# Patient Record
Sex: FEMALE | Race: WHITE | NOT HISPANIC OR LATINO | Employment: FULL TIME | URBAN - METROPOLITAN AREA
[De-identification: names, ages, dates, MRNs, and addresses within clinical notes are randomized per-mention and may not be internally consistent; named-entity substitution may affect disease eponyms.]

---

## 2024-03-19 ENCOUNTER — OFFICE VISIT (OUTPATIENT)
Dept: FAMILY MEDICINE | Facility: CLINIC | Age: 29
End: 2024-03-19
Payer: COMMERCIAL

## 2024-03-19 ENCOUNTER — TELEPHONE (OUTPATIENT)
Dept: SLEEP MEDICINE | Facility: CLINIC | Age: 29
End: 2024-03-19
Payer: COMMERCIAL

## 2024-03-19 VITALS
WEIGHT: 290 LBS | TEMPERATURE: 97 F | SYSTOLIC BLOOD PRESSURE: 164 MMHG | HEIGHT: 65 IN | DIASTOLIC BLOOD PRESSURE: 93 MMHG | RESPIRATION RATE: 18 BRPM | OXYGEN SATURATION: 97 % | BODY MASS INDEX: 48.32 KG/M2 | HEART RATE: 92 BPM

## 2024-03-19 DIAGNOSIS — F51.01 PRIMARY INSOMNIA: ICD-10-CM

## 2024-03-19 DIAGNOSIS — F41.9 ANXIETY: ICD-10-CM

## 2024-03-19 DIAGNOSIS — R29.818 SUSPECTED SLEEP APNEA: Primary | ICD-10-CM

## 2024-03-19 DIAGNOSIS — E66.01 MORBID OBESITY: ICD-10-CM

## 2024-03-19 DIAGNOSIS — E78.2 MIXED HYPERLIPIDEMIA: ICD-10-CM

## 2024-03-19 DIAGNOSIS — Z79.4 TYPE 2 DIABETES MELLITUS WITH HYPERGLYCEMIA, WITH LONG-TERM CURRENT USE OF INSULIN: ICD-10-CM

## 2024-03-19 DIAGNOSIS — Z11.4 SCREENING FOR HIV (HUMAN IMMUNODEFICIENCY VIRUS): ICD-10-CM

## 2024-03-19 DIAGNOSIS — L40.9 PSORIASIS: ICD-10-CM

## 2024-03-19 DIAGNOSIS — I10 ESSENTIAL HYPERTENSION: ICD-10-CM

## 2024-03-19 DIAGNOSIS — Z12.4 PAP SMEAR FOR CERVICAL CANCER SCREENING: ICD-10-CM

## 2024-03-19 DIAGNOSIS — E89.0 POSTABLATIVE HYPOTHYROIDISM: ICD-10-CM

## 2024-03-19 DIAGNOSIS — E11.65 TYPE 2 DIABETES MELLITUS WITH HYPERGLYCEMIA, WITH LONG-TERM CURRENT USE OF INSULIN: ICD-10-CM

## 2024-03-19 PROBLEM — E78.5 HYPERLIPIDEMIA: Status: ACTIVE | Noted: 2017-04-21

## 2024-03-19 PROCEDURE — 1159F MED LIST DOCD IN RCRD: CPT | Mod: CPTII,S$GLB,, | Performed by: STUDENT IN AN ORGANIZED HEALTH CARE EDUCATION/TRAINING PROGRAM

## 2024-03-19 PROCEDURE — 3080F DIAST BP >= 90 MM HG: CPT | Mod: CPTII,S$GLB,, | Performed by: STUDENT IN AN ORGANIZED HEALTH CARE EDUCATION/TRAINING PROGRAM

## 2024-03-19 PROCEDURE — 99385 PREV VISIT NEW AGE 18-39: CPT | Mod: S$GLB,,, | Performed by: STUDENT IN AN ORGANIZED HEALTH CARE EDUCATION/TRAINING PROGRAM

## 2024-03-19 PROCEDURE — 3008F BODY MASS INDEX DOCD: CPT | Mod: CPTII,S$GLB,, | Performed by: STUDENT IN AN ORGANIZED HEALTH CARE EDUCATION/TRAINING PROGRAM

## 2024-03-19 PROCEDURE — 3066F NEPHROPATHY DOC TX: CPT | Mod: CPTII,S$GLB,, | Performed by: STUDENT IN AN ORGANIZED HEALTH CARE EDUCATION/TRAINING PROGRAM

## 2024-03-19 PROCEDURE — 3061F NEG MICROALBUMINURIA REV: CPT | Mod: CPTII,S$GLB,, | Performed by: STUDENT IN AN ORGANIZED HEALTH CARE EDUCATION/TRAINING PROGRAM

## 2024-03-19 PROCEDURE — 1160F RVW MEDS BY RX/DR IN RCRD: CPT | Mod: CPTII,S$GLB,, | Performed by: STUDENT IN AN ORGANIZED HEALTH CARE EDUCATION/TRAINING PROGRAM

## 2024-03-19 PROCEDURE — 4010F ACE/ARB THERAPY RXD/TAKEN: CPT | Mod: CPTII,S$GLB,, | Performed by: STUDENT IN AN ORGANIZED HEALTH CARE EDUCATION/TRAINING PROGRAM

## 2024-03-19 PROCEDURE — 3077F SYST BP >= 140 MM HG: CPT | Mod: CPTII,S$GLB,, | Performed by: STUDENT IN AN ORGANIZED HEALTH CARE EDUCATION/TRAINING PROGRAM

## 2024-03-19 PROCEDURE — 3046F HEMOGLOBIN A1C LEVEL >9.0%: CPT | Mod: CPTII,S$GLB,, | Performed by: STUDENT IN AN ORGANIZED HEALTH CARE EDUCATION/TRAINING PROGRAM

## 2024-03-19 PROCEDURE — 99999 PR PBB SHADOW E&M-NEW PATIENT-LVL V: CPT | Mod: PBBFAC,,, | Performed by: STUDENT IN AN ORGANIZED HEALTH CARE EDUCATION/TRAINING PROGRAM

## 2024-03-19 RX ORDER — ASPIRIN 81 MG/1
TABLET ORAL
COMMUNITY

## 2024-03-19 RX ORDER — LEVOTHYROXINE SODIUM 200 UG/1
200 TABLET ORAL
COMMUNITY
Start: 2024-01-24 | End: 2024-03-19 | Stop reason: SDUPTHER

## 2024-03-19 RX ORDER — METFORMIN HYDROCHLORIDE 500 MG/1
1000 TABLET, EXTENDED RELEASE ORAL 2 TIMES DAILY WITH MEALS
COMMUNITY
Start: 2023-07-05

## 2024-03-19 RX ORDER — TIRZEPATIDE 7.5 MG/.5ML
7.5 INJECTION, SOLUTION SUBCUTANEOUS
Qty: 4 PEN | Refills: 4 | Status: SHIPPED | OUTPATIENT
Start: 2024-03-19 | End: 2024-04-01 | Stop reason: SDUPTHER

## 2024-03-19 RX ORDER — LEVOTHYROXINE SODIUM 50 UG/1
50 TABLET ORAL
COMMUNITY
Start: 2024-01-24 | End: 2024-03-19 | Stop reason: SDUPTHER

## 2024-03-19 RX ORDER — FLUOXETINE HYDROCHLORIDE 40 MG/1
40 CAPSULE ORAL DAILY
Qty: 90 CAPSULE | Refills: 3 | Status: SHIPPED | OUTPATIENT
Start: 2024-03-19

## 2024-03-19 RX ORDER — CARVEDILOL 6.25 MG/1
6.25 TABLET ORAL 2 TIMES DAILY
COMMUNITY
Start: 2024-01-09 | End: 2024-03-19 | Stop reason: SDUPTHER

## 2024-03-19 RX ORDER — INSULIN LISPRO 100 [IU]/ML
50 INJECTION, SOLUTION INTRAVENOUS; SUBCUTANEOUS 3 TIMES DAILY
COMMUNITY
Start: 2024-01-24 | End: 2024-05-31 | Stop reason: DRUGHIGH

## 2024-03-19 RX ORDER — PEN NEEDLE, DIABETIC 30 GX3/16"
NEEDLE, DISPOSABLE MISCELLANEOUS
COMMUNITY
Start: 2024-01-24

## 2024-03-19 RX ORDER — LEVOTHYROXINE SODIUM 200 UG/1
200 TABLET ORAL
Qty: 90 TABLET | Refills: 3 | Status: SHIPPED | OUTPATIENT
Start: 2024-03-19

## 2024-03-19 RX ORDER — HYDROXYZINE PAMOATE 25 MG/1
25-50 CAPSULE ORAL DAILY PRN
Qty: 90 CAPSULE | Refills: 1 | Status: SHIPPED | OUTPATIENT
Start: 2024-03-19

## 2024-03-19 RX ORDER — CARVEDILOL 12.5 MG/1
12.5 TABLET ORAL 2 TIMES DAILY
Qty: 180 TABLET | Refills: 3 | Status: SHIPPED | OUTPATIENT
Start: 2024-03-19 | End: 2024-09-18

## 2024-03-19 RX ORDER — BLOOD-GLUCOSE SENSOR
1 EACH MISCELLANEOUS
COMMUNITY
Start: 2024-01-24 | End: 2024-04-01 | Stop reason: SDUPTHER

## 2024-03-19 RX ORDER — ALPRAZOLAM 0.5 MG/1
0.5 TABLET ORAL NIGHTLY PRN
Qty: 30 TABLET | Refills: 1 | Status: SHIPPED | OUTPATIENT
Start: 2024-03-19

## 2024-03-19 RX ORDER — LEFLUNOMIDE 20 MG/1
20 TABLET ORAL DAILY
COMMUNITY
Start: 2024-01-23

## 2024-03-19 RX ORDER — HYDROCHLOROTHIAZIDE 12.5 MG/1
12.5 TABLET ORAL EVERY MORNING
COMMUNITY
Start: 2024-01-04 | End: 2024-03-19 | Stop reason: SDUPTHER

## 2024-03-19 RX ORDER — LEVOCETIRIZINE DIHYDROCHLORIDE 5 MG/1
5 TABLET, FILM COATED ORAL NIGHTLY
COMMUNITY

## 2024-03-19 RX ORDER — ENALAPRIL MALEATE 20 MG/1
20 TABLET ORAL 2 TIMES DAILY
COMMUNITY
Start: 2024-01-04 | End: 2024-04-01 | Stop reason: SDUPTHER

## 2024-03-19 RX ORDER — ALPRAZOLAM 0.5 MG/1
0.5 TABLET ORAL NIGHTLY PRN
COMMUNITY
Start: 2023-12-08 | End: 2024-03-19 | Stop reason: SDUPTHER

## 2024-03-19 RX ORDER — INSULIN GLARGINE 300 U/ML
50 INJECTION, SOLUTION SUBCUTANEOUS DAILY
COMMUNITY
Start: 2024-01-24

## 2024-03-19 RX ORDER — TIRZEPATIDE 7.5 MG/.5ML
7.5 INJECTION, SOLUTION SUBCUTANEOUS
COMMUNITY
End: 2024-03-19 | Stop reason: SDUPTHER

## 2024-03-19 RX ORDER — LABETALOL 100 MG/1
200 TABLET, FILM COATED ORAL 2 TIMES DAILY
COMMUNITY
Start: 2023-11-27 | End: 2024-03-19

## 2024-03-19 RX ORDER — FLUOXETINE HYDROCHLORIDE 20 MG/1
20 CAPSULE ORAL DAILY
COMMUNITY
Start: 2023-06-15 | End: 2024-03-19 | Stop reason: SDUPTHER

## 2024-03-19 RX ORDER — LEVOTHYROXINE SODIUM 50 UG/1
50 TABLET ORAL
Qty: 90 TABLET | Refills: 3 | Status: SHIPPED | OUTPATIENT
Start: 2024-03-19

## 2024-03-19 RX ORDER — BLOOD-GLUCOSE SENSOR
EACH MISCELLANEOUS
COMMUNITY
Start: 2023-08-16 | End: 2024-04-04

## 2024-03-19 RX ORDER — HYDROCHLOROTHIAZIDE 12.5 MG/1
12.5 TABLET ORAL EVERY MORNING
Qty: 90 TABLET | Refills: 3 | Status: SHIPPED | OUTPATIENT
Start: 2024-03-19

## 2024-03-19 RX ORDER — HYDROXYZINE PAMOATE 25 MG/1
25-50 CAPSULE ORAL DAILY PRN
COMMUNITY
Start: 2024-01-24 | End: 2024-03-19 | Stop reason: SDUPTHER

## 2024-03-19 NOTE — PROGRESS NOTES
Problem List Items Addressed This Visit          Cardiac/Vascular    Essential hypertension    Overview     -at goal today  - Current Hypertension Medications:   Hypertension Medications               carvediloL (COREG) 12.5 MG tablet Take 1 tablet (12.5 mg total) by mouth 2 (two) times daily.    enalapril (VASOTEC) 20 MG tablet Take 20 mg by mouth 2 (two) times daily.    hydroCHLOROthiazide (HYDRODIURIL) 12.5 MG Tab Take 1 tablet (12.5 mg total) by mouth every morning.        -continue lifestyle modification with low sodium diet and exercise   -discussed hypertension disease course and importance of treating high blood pressure  -patient understood and advised of risk of untreated blood pressure.  ER precautions were given   for symptoms of hypertensive urgency and emergency.           Relevant Medications    carvediloL (COREG) 12.5 MG tablet    hydroCHLOROthiazide (HYDRODIURIL) 12.5 MG Tab    Other Relevant Orders    Home Sleep Study    Microalbumin/Creatinine Ratio, Urine    TSH    Lipid Panel    Hemoglobin A1C    Comprehensive Metabolic Panel    CBC Auto Differential    Hyperlipidemia    Overview     -chronic condition. Currently stable.      Hyperlipidemia Medications               rosuvastatin (CRESTOR) 5 MG tablet Take 1 tablet (5 mg total) by mouth once daily.            Lab Results   Component Value Date    CHOL 231 (H) 03/20/2024    CHOL 182 05/16/2023    CHOL 201 (H) 02/17/2023     Lab Results   Component Value Date    HDL 43 03/20/2024    HDL 35 05/16/2023    HDL 40 02/17/2023     Lab Results   Component Value Date    LDLCALC 150.8 03/20/2024    LDLCALC 125 (H) 05/16/2023    LDLCALC 139 (H) 02/17/2023     Lab Results   Component Value Date    TRIG 186 (H) 03/20/2024    TRIG 110 05/16/2023    TRIG 109 02/17/2023     Lab Results   Component Value Date    CHOLHDL 18.6 (L) 03/20/2024    CHOLHDL 5.2 (H) 05/16/2023    CHOLHDL 5.0 (H) 02/17/2023          The ASCVD Risk score (Gladis BRANHAM, et al., 2019) failed  to calculate for the following reasons:    The 2019 ASCVD risk score is only valid for ages 40 to 79           Relevant Orders    Microalbumin/Creatinine Ratio, Urine    TSH    Lipid Panel    Hemoglobin A1C    Comprehensive Metabolic Panel    CBC Auto Differential       Endocrine    Morbid obesity    Overview     Wt Readings from Last 3 Encounters:   03/19/24 1249 131.5 kg (290 lb)     Diabetes Medications               insulin lispro (HUMALOG U-100 INSULIN) 100 unit/mL Crtg Inject 90 Units into the skin 3 (three) times daily. 15 am, 20 lunch, 25 dinner    metFORMIN (GLUCOPHAGE-XR) 500 MG ER 24hr tablet Take 1,000 mg by mouth 2 (two) times daily with meals.    MOUNJARO 7.5 mg/0.5 mL PnIj Inject 7.5 mg into the skin every 7 days.    TOUJEO MAX U-300 SOLOSTAR 300 unit/mL (3 mL) insulin pen Inject 70 Units into the skin once daily.          General weight loss/lifestyle modification strategies discussed: limit sugary drinks, exercise 3-5x per week  Informal exercise measures discussed, e.g. taking stairs instead of elevator.                 Postablative hypothyroidism    Overview     S/p ablation  Chronic hx; stable on synthroid  - denies symptoms   Lab Results   Component Value Date    TSH 0.904 03/20/2024     - cont current dose as rxd         Relevant Medications    levothyroxine (SYNTHROID) 200 MCG tablet    levothyroxine (SYNTHROID) 50 MCG tablet    Type 2 diabetes mellitus with hyperglycemia, with long-term current use of insulin    Overview     uncontrolled   Had been off meds for few months, restart recheck A1c 3m  Lab Results   Component Value Date    HGBA1C 9.4 (H) 03/20/2024   Hypoglycemic Events: none     -current meds:   Diabetes Medications               insulin lispro (HUMALOG U-100 INSULIN) 100 unit/mL Crtg Inject 90 Units into the skin 3 (three) times daily. 15 am, 20 lunch, 25 dinner    metFORMIN (GLUCOPHAGE-XR) 500 MG ER 24hr tablet Take 1,000 mg by mouth 2 (two) times daily with meals.    MOUNJARO  7.5 mg/0.5 mL PnIj Inject 7.5 mg into the skin every 7 days.    TOUJEO MAX U-300 SOLOSTAR 300 unit/mL (3 mL) insulin pen Inject 70 Units into the skin once daily.        -on statin:   Hyperlipidemia Medications               rosuvastatin (CRESTOR) 5 MG tablet Take 1 tablet (5 mg total) by mouth once daily.        -on ACE-I/ARB:   Hypertension Medications               carvediloL (COREG) 12.5 MG tablet Take 1 tablet (12.5 mg total) by mouth 2 (two) times daily.    enalapril (VASOTEC) 20 MG tablet Take 20 mg by mouth 2 (two) times daily.    hydroCHLOROthiazide (HYDRODIURIL) 12.5 MG Tab Take 1 tablet (12.5 mg total) by mouth every morning.        -counseling provided on importance of diabetic diet and medication compliance in order to treat diabetes  -discussed diabetes disease course and potential complications  Follow up 3 months            Relevant Medications    TOUJEO MAX U-300 SOLOSTAR 300 unit/mL (3 mL) insulin pen    insulin lispro (HUMALOG U-100 INSULIN) 100 unit/mL Crtg    metFORMIN (GLUCOPHAGE-XR) 500 MG ER 24hr tablet    MOUNJARO 7.5 mg/0.5 mL PnIj    Other Relevant Orders    Microalbumin/Creatinine Ratio, Urine    TSH    Lipid Panel    Hemoglobin A1C    Comprehensive Metabolic Panel    CBC Auto Differential       Other    Suspected sleep apnea - Primary    Overview      Home sleep study         Relevant Orders    Home Sleep Study     Other Visit Diagnoses       Anxiety        Relevant Medications    ALPRAZolam (XANAX) 0.5 MG tablet    FLUoxetine 40 MG capsule    Primary insomnia        Relevant Medications    hydrOXYzine pamoate (VISTARIL) 25 MG Cap    Psoriasis        Relevant Orders    Ambulatory referral/consult to Rheumatology    Pap smear for cervical cancer screening        Relevant Orders    Ambulatory referral/consult to Obstetrics / Gynecology    Screening for HIV (human immunodeficiency virus)        Relevant Orders    HIV 1/2 Ag/Ab (4th Gen) (Completed)              Patient ID: Candida Barker is a  28 y.o. female.    Chief Complaint:  establish care    Patient is here to establish care. Reports she had been out of meds for few months with job/insurance change. Has now. Reports feeling well. No concerns. Denies fevers, chills, chest pain, SOB, fatigue, abdominal pain, nausea, vomiting, dysuria, hematuria, hematochezia, or melena.      Lives with her  and 3 kids.      History:  OBGYN: due for pap, referral placed      LMP: Patient's last menstrual period was 03/13/2024.   MMG: n/a   PAP: referral placed  Colonoscopy: n/a     Health Maintenance Topics with due status: Not Due       Topic Last Completion Date    TETANUS VACCINE 10/14/2022    Eye Exam 01/10/2024    Diabetes Urine Screening 03/20/2024    Lipid Panel 03/20/2024    Hemoglobin A1c 03/20/2024        ==============================================  History reviewed.   Health Maintenance Due   Topic Date Due    Foot Exam  Never done    Pap Smear  Never done    COVID-19 Vaccine (3 - 2023-24 season) 09/01/2023       Past Medical History:  History reviewed. No pertinent past medical history.  Past Surgical History:   Procedure Laterality Date    CHOLECYSTECTOMY  12/2023     Review of patient's allergies indicates:  No Known Allergies  Current Outpatient Medications on File Prior to Visit   Medication Sig Dispense Refill    aspirin (ECOTRIN) 81 MG EC tablet       blood-glucose sensor (FREESTYLE AYALA 3 SENSOR) Ernestine USE 1 sensor EVERY 14 DAYS AND replace WITH new sensor      enalapril (VASOTEC) 20 MG tablet Take 20 mg by mouth 2 (two) times daily.      FREESTYLE AYALA 3 SENSOR Ernestine 1 each by Other route.      insulin lispro (HUMALOG U-100 INSULIN) 100 unit/mL Crtg Inject 90 Units into the skin 3 (three) times daily. 15 am, 20 lunch, 25 dinner      Lactobacillus rhamnosus GG (CULTURELLE) 10 billion cell capsule Take 1 capsule by mouth once daily.      leflunomide (ARAVA) 20 MG Tab Take 20 mg by mouth once daily.      levocetirizine (XYZAL) 5 MG tablet  "Take 5 mg by mouth every evening.      metFORMIN (GLUCOPHAGE-XR) 500 MG ER 24hr tablet Take 1,000 mg by mouth 2 (two) times daily with meals.      pen needle, diabetic 32 gauge x 5/32" Ndle 4 injections daily      TOUJEO MAX U-300 SOLOSTAR 300 unit/mL (3 mL) insulin pen Inject 70 Units into the skin once daily.       No current facility-administered medications on file prior to visit.     Social History     Socioeconomic History    Marital status:    Tobacco Use    Smoking status: Never    Smokeless tobacco: Never   Substance and Sexual Activity    Alcohol use: Yes     Alcohol/week: 1.0 standard drink of alcohol     Types: 1 Glasses of wine per week    Drug use: Never    Sexual activity: Yes     Partners: Male     Birth control/protection: None     Social Determinants of Health     Financial Resource Strain: Low Risk  (3/18/2024)    Overall Financial Resource Strain (CARDIA)     Difficulty of Paying Living Expenses: Not very hard   Food Insecurity: No Food Insecurity (3/18/2024)    Hunger Vital Sign     Worried About Running Out of Food in the Last Year: Never true     Ran Out of Food in the Last Year: Never true   Transportation Needs: No Transportation Needs (3/18/2024)    PRAPARE - Transportation     Lack of Transportation (Medical): No     Lack of Transportation (Non-Medical): No   Physical Activity: Sufficiently Active (3/18/2024)    Exercise Vital Sign     Days of Exercise per Week: 4 days     Minutes of Exercise per Session: 40 min   Stress: Stress Concern Present (3/18/2024)    [employer] [place] [employer] - Occupational Stress Questionnaire     Feeling of Stress : To some extent   Social Connections: Unknown (3/18/2024)    Social Connection and Isolation Panel [NHANES]     Frequency of Communication with Friends and Family: More than three times a week     Frequency of Social Gatherings with Friends and Family: More than three times a week     Active Member of Clubs or Organizations: Yes "     Attends Club or Organization Meetings: 1 to 4 times per year     Marital Status:    Housing Stability: Low Risk  (3/18/2024)    Housing Stability Vital Sign     Unable to Pay for Housing in the Last Year: No     Number of Places Lived in the Last Year: 1     Unstable Housing in the Last Year: No     Family History   Problem Relation Age of Onset    Diabetes Mother     Early death Mother     Hypertension Mother     Miscarriages / Stillbirths Mother     Drug abuse Father     Hypertension Father     Diabetes Maternal Grandfather     Cancer Maternal Grandmother     Vision loss Maternal Grandmother     Asthma Maternal Aunt     Hypertension Maternal Aunt     Stroke Maternal Aunt     Diabetes Maternal Aunt           Review of Systems   12 point review of systems per hpi, otherwise negative         Objective:    Nursing note and vitals reviewed.  Wt Readings from Last 3 Encounters:   03/19/24 131.5 kg (290 lb)     Temp Readings from Last 3 Encounters:   03/19/24 97.2 °F (36.2 °C)     BP Readings from Last 3 Encounters:   03/21/24 139/76   03/19/24 (!) 164/93     Pulse Readings from Last 3 Encounters:   03/19/24 92     Body mass index is 48.26 kg/m².     Physical Exam   Constitutional: SHE is oriented to person, place, and time. She appears well-developed and well-nourished. No distress.   HENT: WNL  Head: Normocephalic and atraumatic.   Eyes: Pupils are equal, round, and reactive to light. EOM are normal.   Neck: Normal range of motion. Neck supple.   Cardiovascular: Normal rate, regular rhythm, normal heart sounds and intact distal pulses.   No murmur heard.  Pulmonary/Chest: Effort normal and breath sounds normal. No respiratory distress. She has no wheezes.   GI: soft, non distended, no ttp, no rebound/guarding  Musculoskeletal: Normal range of motion. She exhibits no edema.   Neurological: She is alert and oriented to person, place, and time. No cranial nerve deficit.   Skin: Skin is warm and dry. Capillary  refill takes less than 2 seconds.   Psychiatric: She has a normal mood and affect. Her behavior is normal.           Kaylee Yeager MD    We Offer Telehealth & Same Day Appointments!   Book your Telehealth appointment with me through my nurse or   Clinic appointments on "[employer] [place]!  [phone]     To Schedule appointments online, go to [employer]: [email or web address]

## 2024-03-19 NOTE — TELEPHONE ENCOUNTER
----- Message from Nilo Weinberg sent at 3/19/2024  1:36 PM CDT -----  [place], [facility] [place]

## 2024-03-19 NOTE — PATIENT INSTRUCTIONS
Nitesh Tirado,     If you are due for any health screening(s) below please notify me so we can arrange them to be ordered and scheduled. Most healthy patients at your age complete them, but you are free to accept or refuse.     If you can't do it, I'll definitely understand. If you can, I'd certainly appreciate it!    Tests to Keep You Healthy    Cervical Cancer Screening: DUE      Your cervical cancer screening is due     Our records indicate that you may be overdue for your screening Pap smear. A Pap smear is an important health screening that can detect abnormal cells that can become cervical cancer. Cervical cancer screenings allow for early diagnosis and increase the likelihood of successful treatment.     The current recommendation for Pap smear screening is every 3-5 years for women at average risk. We encourage you to schedule your appointment with your womens health provider. Many women see a gynecologist for this screening, but some primary care providers also provide Pap screening.     If you recently had your Pap smear screening performed outside of [facility], please let your health care team know so that they can update your health record.

## 2024-03-20 ENCOUNTER — LAB VISIT (OUTPATIENT)
Dept: LAB | Facility: HOSPITAL | Age: 29
End: 2024-03-20
Attending: STUDENT IN AN ORGANIZED HEALTH CARE EDUCATION/TRAINING PROGRAM
Payer: COMMERCIAL

## 2024-03-20 DIAGNOSIS — Z79.4 TYPE 2 DIABETES MELLITUS WITH HYPERGLYCEMIA, WITH LONG-TERM CURRENT USE OF INSULIN: ICD-10-CM

## 2024-03-20 DIAGNOSIS — E78.2 MIXED HYPERLIPIDEMIA: ICD-10-CM

## 2024-03-20 DIAGNOSIS — Z11.4 SCREENING FOR HIV (HUMAN IMMUNODEFICIENCY VIRUS): ICD-10-CM

## 2024-03-20 DIAGNOSIS — I10 ESSENTIAL HYPERTENSION: ICD-10-CM

## 2024-03-20 DIAGNOSIS — E11.65 TYPE 2 DIABETES MELLITUS WITH HYPERGLYCEMIA, WITH LONG-TERM CURRENT USE OF INSULIN: ICD-10-CM

## 2024-03-20 LAB
ALBUMIN SERPL BCP-MCNC: 3.8 G/DL (ref 3.5–5.2)
ALBUMIN/CREAT UR: 6.4 UG/MG (ref 0–30)
ALP SERPL-CCNC: 105 U/L (ref 55–135)
ALT SERPL W/O P-5'-P-CCNC: 16 U/L (ref 10–44)
ANION GAP SERPL CALC-SCNC: 11 MMOL/L (ref 8–16)
AST SERPL-CCNC: 17 U/L (ref 10–40)
BASOPHILS # BLD AUTO: 0.07 K/UL (ref 0–0.2)
BASOPHILS NFR BLD: 1 % (ref 0–1.9)
BILIRUB SERPL-MCNC: 0.4 MG/DL (ref 0.1–1)
BUN SERPL-MCNC: 12 MG/DL (ref 6–20)
CALCIUM SERPL-MCNC: 9.9 MG/DL (ref 8.7–10.5)
CHLORIDE SERPL-SCNC: 99 MMOL/L (ref 95–110)
CHOLEST SERPL-MCNC: 231 MG/DL (ref 120–199)
CHOLEST/HDLC SERPL: 5.4 {RATIO} (ref 2–5)
CO2 SERPL-SCNC: 25 MMOL/L (ref 23–29)
CREAT SERPL-MCNC: 0.8 MG/DL (ref 0.5–1.4)
CREAT UR-MCNC: 141 MG/DL (ref 15–325)
DIFFERENTIAL METHOD BLD: ABNORMAL
EOSINOPHIL # BLD AUTO: 0.2 K/UL (ref 0–0.5)
EOSINOPHIL NFR BLD: 2.8 % (ref 0–8)
ERYTHROCYTE [DISTWIDTH] IN BLOOD BY AUTOMATED COUNT: 13.9 % (ref 11.5–14.5)
EST. GFR  (NO RACE VARIABLE): >60 ML/MIN/1.73 M^2
ESTIMATED AVG GLUCOSE: 223 MG/DL (ref 68–131)
GLUCOSE SERPL-MCNC: 288 MG/DL (ref 70–110)
HBA1C MFR BLD: 9.4 % (ref 4–5.6)
HCT VFR BLD AUTO: 40.7 % (ref 37–48.5)
HDLC SERPL-MCNC: 43 MG/DL (ref 40–75)
HDLC SERPL: 18.6 % (ref 20–50)
HGB BLD-MCNC: 12.6 G/DL (ref 12–16)
HIV 1+2 AB+HIV1 P24 AG SERPL QL IA: NORMAL
IMM GRANULOCYTES # BLD AUTO: 0.03 K/UL (ref 0–0.04)
IMM GRANULOCYTES NFR BLD AUTO: 0.4 % (ref 0–0.5)
LDLC SERPL CALC-MCNC: 150.8 MG/DL (ref 63–159)
LYMPHOCYTES # BLD AUTO: 1.4 K/UL (ref 1–4.8)
LYMPHOCYTES NFR BLD: 20.5 % (ref 18–48)
MCH RBC QN AUTO: 25.6 PG (ref 27–31)
MCHC RBC AUTO-ENTMCNC: 31 G/DL (ref 32–36)
MCV RBC AUTO: 83 FL (ref 82–98)
MICROALBUMIN UR DL<=1MG/L-MCNC: 9 UG/ML
MONOCYTES # BLD AUTO: 0.3 K/UL (ref 0.3–1)
MONOCYTES NFR BLD: 4.5 % (ref 4–15)
NEUTROPHILS # BLD AUTO: 4.7 K/UL (ref 1.8–7.7)
NEUTROPHILS NFR BLD: 70.8 % (ref 38–73)
NONHDLC SERPL-MCNC: 188 MG/DL
NRBC BLD-RTO: 0 /100 WBC
PLATELET # BLD AUTO: 288 K/UL (ref 150–450)
PMV BLD AUTO: 12.9 FL (ref 9.2–12.9)
POTASSIUM SERPL-SCNC: 4.1 MMOL/L (ref 3.5–5.1)
PROT SERPL-MCNC: 7.3 G/DL (ref 6–8.4)
RBC # BLD AUTO: 4.92 M/UL (ref 4–5.4)
SODIUM SERPL-SCNC: 135 MMOL/L (ref 136–145)
TRIGL SERPL-MCNC: 186 MG/DL (ref 30–150)
TSH SERPL DL<=0.005 MIU/L-ACNC: 0.9 UIU/ML (ref 0.4–4)
WBC # BLD AUTO: 6.69 K/UL (ref 3.9–12.7)

## 2024-03-20 PROCEDURE — 84443 ASSAY THYROID STIM HORMONE: CPT | Performed by: STUDENT IN AN ORGANIZED HEALTH CARE EDUCATION/TRAINING PROGRAM

## 2024-03-20 PROCEDURE — 87389 HIV-1 AG W/HIV-1&-2 AB AG IA: CPT | Performed by: STUDENT IN AN ORGANIZED HEALTH CARE EDUCATION/TRAINING PROGRAM

## 2024-03-20 PROCEDURE — 80053 COMPREHEN METABOLIC PANEL: CPT | Performed by: STUDENT IN AN ORGANIZED HEALTH CARE EDUCATION/TRAINING PROGRAM

## 2024-03-20 PROCEDURE — 80061 LIPID PANEL: CPT | Performed by: STUDENT IN AN ORGANIZED HEALTH CARE EDUCATION/TRAINING PROGRAM

## 2024-03-20 PROCEDURE — 85025 COMPLETE CBC W/AUTO DIFF WBC: CPT | Performed by: STUDENT IN AN ORGANIZED HEALTH CARE EDUCATION/TRAINING PROGRAM

## 2024-03-20 PROCEDURE — 82043 UR ALBUMIN QUANTITATIVE: CPT | Performed by: STUDENT IN AN ORGANIZED HEALTH CARE EDUCATION/TRAINING PROGRAM

## 2024-03-20 PROCEDURE — 36415 COLL VENOUS BLD VENIPUNCTURE: CPT | Mod: PO | Performed by: STUDENT IN AN ORGANIZED HEALTH CARE EDUCATION/TRAINING PROGRAM

## 2024-03-20 PROCEDURE — 83036 HEMOGLOBIN GLYCOSYLATED A1C: CPT | Performed by: STUDENT IN AN ORGANIZED HEALTH CARE EDUCATION/TRAINING PROGRAM

## 2024-03-21 ENCOUNTER — PATIENT MESSAGE (OUTPATIENT)
Dept: FAMILY MEDICINE | Facility: CLINIC | Age: 29
End: 2024-03-21
Payer: COMMERCIAL

## 2024-03-21 VITALS — DIASTOLIC BLOOD PRESSURE: 76 MMHG | SYSTOLIC BLOOD PRESSURE: 139 MMHG

## 2024-03-21 DIAGNOSIS — E11.69 HYPERLIPIDEMIA ASSOCIATED WITH TYPE 2 DIABETES MELLITUS: Primary | ICD-10-CM

## 2024-03-21 DIAGNOSIS — E78.5 HYPERLIPIDEMIA ASSOCIATED WITH TYPE 2 DIABETES MELLITUS: Primary | ICD-10-CM

## 2024-03-21 RX ORDER — ROSUVASTATIN CALCIUM 5 MG/1
5 TABLET, COATED ORAL DAILY
Qty: 90 TABLET | Refills: 3 | Status: SHIPPED | OUTPATIENT
Start: 2024-03-21 | End: 2025-03-21

## 2024-03-21 NOTE — PROGRESS NOTES
3m A1c  6m A1c and lipid           Dear Ms.Sheila Barker       I have reviewed your recent blood work:     - Your HIV screening normal (patients are recommended to be screened at least once in their lifetime)     - Your complete blood count is normal     - Your metabolic panel which shows your electrolytes, glucose, kidney function, and liver function is normal  except high glucose    - Thyroid function is normal     A1c 9.4 from 7.2 in Nov 2/2 pt not having her meds. She has since restarted dm meds. Recheck in     Cholesterol high, start crestor 5mg, sent to pharm, recheck 6m    Please do not hesitate to call or message with any additional questions or concerns.  Kaylee Yeager MD

## 2024-03-25 ENCOUNTER — HOSPITAL ENCOUNTER (OUTPATIENT)
Dept: SLEEP MEDICINE | Facility: HOSPITAL | Age: 29
Discharge: HOME | End: 2024-03-25
Attending: STUDENT IN AN ORGANIZED HEALTH CARE EDUCATION/TRAINING PROGRAM
Payer: COMMERCIAL

## 2024-03-25 DIAGNOSIS — I10 ESSENTIAL HYPERTENSION: ICD-10-CM

## 2024-03-25 DIAGNOSIS — R29.818 SUSPECTED SLEEP APNEA: ICD-10-CM

## 2024-03-25 DIAGNOSIS — G47.33 OSA (OBSTRUCTIVE SLEEP APNEA): Primary | ICD-10-CM

## 2024-03-26 ENCOUNTER — PATIENT MESSAGE (OUTPATIENT)
Dept: PULMONOLOGY | Facility: CLINIC | Age: 29
End: 2024-03-26

## 2024-03-26 DIAGNOSIS — G47.33 MILD OBSTRUCTIVE SLEEP APNEA: Primary | ICD-10-CM

## 2024-03-26 PROCEDURE — 95806 SLEEP STUDY UNATT&RESP EFFT: CPT | Mod: 26,,, | Performed by: INTERNAL MEDICINE

## 2024-03-26 NOTE — PROCEDURES
PHYSICIAN INTERPRETATION AND COMMENTS: Findings are consistent with mild obstructive sleep apnea (KEIRA).  Indications of cardiac dysrhythmia are recorded. Please refer to sleep disorders clinic  CLINICAL HISTORY: 28 year old female presented with: 17 inch neck, BMI of 46, an [place] sleepiness score of 12, history of  hypertension, diabetes and symptoms of nocturnal snoring, waking up choking and witnessed apneas. Based on the  clinical history, the patient has a high pre-test probability of having Severe KEIRA.  SLEEP STUDY FINDINGS: Patient underwent a 1 night Home Sleep Test and by behavioral criteria, slept for approximately  6.87 hours , with a sleep efficiency of 98%. When considering more subtle measures of sleep disordered breathing, the  overall respiratory disturbance index is mild(RDI=14) based on a 1% hypopnea desaturation criteria with confirmation by  surrogate arousal indicators. The apneas/hypopneas are accompanied by minimal oxygen desaturation (percent time  below 90% SpO2: 0%, Min SpO2: 86.2%). The average desaturation across all sleep disordered breathing events is 2%.  Snoring occurs for 7.6% (30 dB) of the study. The mean pulse rate is 77.1 BPM, with frequent pulse rate variability (60 events  with >= 6 BPM increase/decrease per hour).  TREATMENT CONSIDERATIONS: For a patient with mild asymptomatic KEIRA, nasal continuous positive airway pressure  (CPAP/AutoPAP) therapy or alternative therapies for treatment should be considered, i.e., Mandibular advancement splint  (MAS), referral to an ENT surgeon for modification to the airway, and/or weight loss or behavioral therapy to reduce the  potential risk of daytime somnolence, hypertension, cardiovascular disease, stroke and diabetes.  DISEASE MANAGEMENT CONSIDERATIONS: Irregularity of the pulse rate signals indicates possible cardiac dysrhythmia. If  clinically appropriate, further cardiac evaluation is suggested. Sleeping pills may increase the  "severity of untreated KEIRA  and their use should be reevaluated once the KEIRA is treated.        Dear Kaylee Yeager MD  [id number] [facility]  [place] [id number]/Kaylee Yeager MD         The sleep study that you ordered is complete.  You have ordered sleep LAB services to perform the sleep study for Candida Barker .      Please find Sleep Study result in  the "Media tab" of Chart Review menu.        You can look  for the report in the  Media by the document type "Sleep Study Documents". Alphabetizing  "Document type" column helps to find the SLEEP STUDY report  Faster.       As the ordering provider, you are responsible for reviewing the results and implementing a treatment plan with your patient.    If you need a Sleep Medicine provider to explain the sleep study findings and arrange treatment for the patient, please refer patient for consultation to our Sleep Clinic via [facility] with Ambulatory Consult Sleep.     To do that please place an order for an  "Ambulatory Consult Sleep" -  order , it will go to our clinic work queue for our staff  to contact the patient for an appointment.      For any questions, please contact our sleep lab  staff at [phone] to talk to clinical staff          Brando Springer MD   "

## 2024-03-26 NOTE — PROGRESS NOTES
I have sent a msg to patient with the following interpretation (see below):    Sleep study shows mild obstructive sleep apnea (KEIRA)    Will send referral to sleep clinic to discuss cpap    Please do not hesitate to call or message with any questions or concerns    Kaylee Yeager MD

## 2024-04-01 ENCOUNTER — PATIENT MESSAGE (OUTPATIENT)
Dept: FAMILY MEDICINE | Facility: CLINIC | Age: 29
End: 2024-04-01
Payer: COMMERCIAL

## 2024-04-01 ENCOUNTER — OFFICE VISIT (OUTPATIENT)
Dept: PULMONOLOGY | Facility: CLINIC | Age: 29
End: 2024-04-01
Payer: COMMERCIAL

## 2024-04-01 VITALS — HEIGHT: 65 IN | HEART RATE: 76 BPM | WEIGHT: 290 LBS | BODY MASS INDEX: 48.32 KG/M2

## 2024-04-01 DIAGNOSIS — Z79.4 TYPE 2 DIABETES MELLITUS WITH HYPERGLYCEMIA, WITH LONG-TERM CURRENT USE OF INSULIN: ICD-10-CM

## 2024-04-01 DIAGNOSIS — E11.65 TYPE 2 DIABETES MELLITUS WITH HYPERGLYCEMIA, WITH LONG-TERM CURRENT USE OF INSULIN: ICD-10-CM

## 2024-04-01 DIAGNOSIS — E89.0 POSTABLATIVE HYPOTHYROIDISM: ICD-10-CM

## 2024-04-01 DIAGNOSIS — I10 ESSENTIAL HYPERTENSION: ICD-10-CM

## 2024-04-01 DIAGNOSIS — E66.01 MORBID OBESITY: ICD-10-CM

## 2024-04-01 DIAGNOSIS — G47.33 OSA (OBSTRUCTIVE SLEEP APNEA): Primary | ICD-10-CM

## 2024-04-01 DIAGNOSIS — G47.33 MILD OBSTRUCTIVE SLEEP APNEA: ICD-10-CM

## 2024-04-01 PROCEDURE — 1159F MED LIST DOCD IN RCRD: CPT | Mod: CPTII,95,, | Performed by: INTERNAL MEDICINE

## 2024-04-01 PROCEDURE — 4010F ACE/ARB THERAPY RXD/TAKEN: CPT | Mod: CPTII,95,, | Performed by: INTERNAL MEDICINE

## 2024-04-01 PROCEDURE — 3008F BODY MASS INDEX DOCD: CPT | Mod: CPTII,95,, | Performed by: INTERNAL MEDICINE

## 2024-04-01 PROCEDURE — 3066F NEPHROPATHY DOC TX: CPT | Mod: CPTII,95,, | Performed by: INTERNAL MEDICINE

## 2024-04-01 PROCEDURE — 3061F NEG MICROALBUMINURIA REV: CPT | Mod: CPTII,95,, | Performed by: INTERNAL MEDICINE

## 2024-04-01 PROCEDURE — 99203 OFFICE O/P NEW LOW 30 MIN: CPT | Mod: 95,,, | Performed by: INTERNAL MEDICINE

## 2024-04-01 PROCEDURE — 1160F RVW MEDS BY RX/DR IN RCRD: CPT | Mod: CPTII,95,, | Performed by: INTERNAL MEDICINE

## 2024-04-01 PROCEDURE — 3046F HEMOGLOBIN A1C LEVEL >9.0%: CPT | Mod: CPTII,95,, | Performed by: INTERNAL MEDICINE

## 2024-04-01 RX ORDER — BLOOD-GLUCOSE SENSOR
1 EACH MISCELLANEOUS 3 TIMES DAILY PRN
Qty: 100 EACH | Refills: 3 | Status: SHIPPED | OUTPATIENT
Start: 2024-04-01 | End: 2024-04-04

## 2024-04-01 RX ORDER — TIRZEPATIDE 7.5 MG/.5ML
7.5 INJECTION, SOLUTION SUBCUTANEOUS
Qty: 4 PEN | Refills: 3 | Status: SHIPPED | OUTPATIENT
Start: 2024-04-01 | End: 2024-05-31 | Stop reason: DRUGHIGH

## 2024-04-01 RX ORDER — ENALAPRIL MALEATE 20 MG/1
20 TABLET ORAL 2 TIMES DAILY
Qty: 90 TABLET | Refills: 3 | Status: SHIPPED | OUTPATIENT
Start: 2024-04-01 | End: 2024-04-08 | Stop reason: SDUPTHER

## 2024-04-01 NOTE — ASSESSMENT & PLAN NOTE
Treatment Options for Sleep Disordered Breathing discussed:     [x]    Continuous Positive Airway Pressure (CPAP).  []    Surgical options  []    Oral appliances   [x]    Behavioral approaches.   [x]    Weight loss.   []    Avoiding alcohol and sedative medication.  []    Treat other underlying medical conditions eg. nasal allergies  []     INSPIRE  []

## 2024-04-01 NOTE — PROGRESS NOTES
The patient location is: [facility]  The chief complaint leading to consultation is:   Chief Complaint   Patient presents with    Apnea        Visit type: audiovisual    Face to Face time with patient: 8 mins  45 minutes of total time spent on the encounter, which includes face to face time and non-face to face time preparing to see the patient (eg, review of tests), Obtaining and/or reviewing separately obtained history, Documenting clinical information in the electronic or other health record, Independently interpreting results (not separately reported) and communicating results to the patient/family/caregiver, or Care coordination (not separately reported).         Each patient to whom he or she provides medical services by telemedicine is:  (1) informed of the relationship between the physician and patient and the respective role of any other health care provider with respect to management of the patient; and (2) notified that he or she may decline to receive medical services by telemedicine and may withdraw from such care at any time.    Notes:                                             Pulmonary Outpatient  Visit     Subjective:       Patient ID: Candida Barker is a 28 y.o. female.    Social History     Tobacco Use   Smoking Status Never   Smokeless Tobacco Never            Chief Complaint: Apnea        Candida Barker is 28 y.o.  Referred to me by Kaylee Yeager MD  [address] [place] [address]   HSAt was +ve for KEIRA  Elevated BP in Dec 2023  220/120 mmHG: on meds: Carvedilol, Enalapril and HCTZ  Cardiology concern for KEIRA  Dr heath  Bed time 9 pm  Wake time 6 am  HTN, BMI, DM  Sleep study was reviewed with patient              Review of Systems   Constitutional:  Positive for fatigue.   Respiratory:  Positive for apnea, snoring and somnolence.    Psychiatric/Behavioral:  Positive for sleep disturbance.    All other systems reviewed and are negative.      Outpatient Encounter Medications as of  "4/1/2024   Medication Sig Dispense Refill    ALPRAZolam (XANAX) 0.5 MG tablet Take 1 tablet (0.5 mg total) by mouth nightly as needed for Anxiety. 30 tablet 1    aspirin (ECOTRIN) 81 MG EC tablet       blood-glucose sensor (FREESTYLE AYALA 3 SENSOR) Ernestine USE 1 sensor EVERY 14 DAYS AND replace WITH new sensor      carvediloL (COREG) 12.5 MG tablet Take 1 tablet (12.5 mg total) by mouth 2 (two) times daily. 180 tablet 3    enalapril (VASOTEC) 20 MG tablet Take 20 mg by mouth 2 (two) times daily.      FLUoxetine 40 MG capsule Take 1 capsule (40 mg total) by mouth once daily. 90 capsule 3    FREESTYLE AYALA 3 SENSOR Ernestine 1 each by Other route.      hydroCHLOROthiazide (HYDRODIURIL) 12.5 MG Tab Take 1 tablet (12.5 mg total) by mouth every morning. 90 tablet 3    hydrOXYzine pamoate (VISTARIL) 25 MG Cap Take 1-2 capsules (25-50 mg total) by mouth daily as needed (insomnia). 90 capsule 1    insulin lispro (HUMALOG U-100 INSULIN) 100 unit/mL Crtg Inject 90 Units into the skin 3 (three) times daily. 15 am, 20 lunch, 25 dinner      Lactobacillus rhamnosus GG (CULTURELLE) 10 billion cell capsule Take 1 capsule by mouth once daily.      leflunomide (ARAVA) 20 MG Tab Take 20 mg by mouth once daily.      levocetirizine (XYZAL) 5 MG tablet Take 5 mg by mouth every evening.      levothyroxine (SYNTHROID) 200 MCG tablet Take 1 tablet (200 mcg total) by mouth before breakfast. 90 tablet 3    levothyroxine (SYNTHROID) 50 MCG tablet Take 1 tablet (50 mcg total) by mouth before breakfast. 90 tablet 3    metFORMIN (GLUCOPHAGE-XR) 500 MG ER 24hr tablet Take 1,000 mg by mouth 2 (two) times daily with meals.      MOUNJARO 7.5 mg/0.5 mL PnIj Inject 7.5 mg into the skin every 7 days. 4 Pen 4    pen needle, diabetic 32 gauge x 5/32" Ndle 4 injections daily      rosuvastatin (CRESTOR) 5 MG tablet Take 1 tablet (5 mg total) by mouth once daily. 90 tablet 3    TOUJEO MAX U-300 SOLOSTAR 300 unit/mL (3 mL) insulin pen Inject 70 Units into the skin " once daily.       No facility-administered encounter medications on file as of 4/1/2024.       The following portions of the patient's history were reviewed and updated as appropriate: She  has no past medical history on file.  She does not have any pertinent problems on file.  She  has a past surgical history that includes Cholecystectomy (12/2023).  Her family history includes Asthma in her maternal aunt; Cancer in her maternal grandmother; Diabetes in her maternal aunt, maternal grandfather, and mother; Drug abuse in her father; Early death in her mother; Hypertension in her father, maternal aunt, and mother; Miscarriages / Stillbirths in her mother; Stroke in her maternal aunt; Vision loss in her maternal grandmother.  She  reports that she has never smoked. She has never used smokeless tobacco. She reports current alcohol use of about 1.0 standard drink of alcohol per week. She reports that she does not use drugs.  She has a current medication list which includes the following prescription(s): alprazolam, aspirin, freestyle ayala 3 sensor, carvedilol, enalapril, fluoxetine, freestyle ayala 3 sensor, hydrochlorothiazide, hydroxyzine pamoate, humalog u-100 insulin, lactobacillus rhamnosus gg, leflunomide, levocetirizine, levothyroxine, levothyroxine, metformin, mounjaro, pen needle, diabetic, rosuvastatin, and toujeo max u-300 solostar.  Current Outpatient Medications on File Prior to Visit   Medication Sig Dispense Refill    ALPRAZolam (XANAX) 0.5 MG tablet Take 1 tablet (0.5 mg total) by mouth nightly as needed for Anxiety. 30 tablet 1    aspirin (ECOTRIN) 81 MG EC tablet       blood-glucose sensor (FREESTYLE AYALA 3 SENSOR) Ernestine USE 1 sensor EVERY 14 DAYS AND replace WITH new sensor      carvediloL (COREG) 12.5 MG tablet Take 1 tablet (12.5 mg total) by mouth 2 (two) times daily. 180 tablet 3    enalapril (VASOTEC) 20 MG tablet Take 20 mg by mouth 2 (two) times daily.      FLUoxetine 40 MG capsule Take 1  "capsule (40 mg total) by mouth once daily. 90 capsule 3    FREESTYLE AYALA 3 SENSOR Ernestine 1 each by Other route.      hydroCHLOROthiazide (HYDRODIURIL) 12.5 MG Tab Take 1 tablet (12.5 mg total) by mouth every morning. 90 tablet 3    hydrOXYzine pamoate (VISTARIL) 25 MG Cap Take 1-2 capsules (25-50 mg total) by mouth daily as needed (insomnia). 90 capsule 1    insulin lispro (HUMALOG U-100 INSULIN) 100 unit/mL Crtg Inject 90 Units into the skin 3 (three) times daily. 15 am, 20 lunch, 25 dinner      Lactobacillus rhamnosus GG (CULTURELLE) 10 billion cell capsule Take 1 capsule by mouth once daily.      leflunomide (ARAVA) 20 MG Tab Take 20 mg by mouth once daily.      levocetirizine (XYZAL) 5 MG tablet Take 5 mg by mouth every evening.      levothyroxine (SYNTHROID) 200 MCG tablet Take 1 tablet (200 mcg total) by mouth before breakfast. 90 tablet 3    levothyroxine (SYNTHROID) 50 MCG tablet Take 1 tablet (50 mcg total) by mouth before breakfast. 90 tablet 3    metFORMIN (GLUCOPHAGE-XR) 500 MG ER 24hr tablet Take 1,000 mg by mouth 2 (two) times daily with meals.      MOUNJARO 7.5 mg/0.5 mL PnIj Inject 7.5 mg into the skin every 7 days. 4 Pen 4    pen needle, diabetic 32 gauge x 5/32" Ndle 4 injections daily      rosuvastatin (CRESTOR) 5 MG tablet Take 1 tablet (5 mg total) by mouth once daily. 90 tablet 3    TOUJEO MAX U-300 SOLOSTAR 300 unit/mL (3 mL) insulin pen Inject 70 Units into the skin once daily.       No current facility-administered medications on file prior to visit.     She has No Known Allergies..      BP Readings from Last 3 Encounters:   03/21/24 139/76   03/19/24 (!) 164/93     Snoring / Sleep:     [place] Questionnaire (validated KEIRA screening questionnaire)    YES -- Snoring/apnea    YES -- Fatigue    Body mass index is 48.26 kg/m².  (>25 is overweight, >30 is obese)    Blood Pressure = Hypertension  (PreHTN 120-139/80-89, Stg1 140-159/90-99, Stg2 >160/>100)  [place] = 3 of three KEIRA categories are " "positive (high risk is 2-3 positive categories)     [place] Sleepiness Scale TOTAL =        4/1/2024    10:25 AM   EPWORTH SLEEPINESS SCALE   Sitting and reading 2   Watching TV 2   Sitting, inactive in a public place (e.g. a theatre or a meeting) 0   As a passenger in a car for an hour without a break 2   Lying down to rest in the afternoon when circumstances permit 2   Sitting and talking to someone 0   Sitting quietly after a lunch without alcohol 0   In a car, while stopped for a few minutes in traffic 0   Total score 8      (validated sleepiness questionnaire with a higher score indicating greater sleepiness; range 0-24)      STOP-Bang Questionnaire (validated KEIRA screening questionnaire)  Negative unless checked off.  [x] Snoring    [x]  Tired/Fatigued/Sleepy  [x] Obstruction (apneas/choking)  [x] Pressure (HTN)  [x] BMI >35  [] Age >50  [] Neck >40 cm  [] Gender male   STOP-Bang = 5 (low risk 0-2,high risk 3-8)    Neck ci   MMRC Dyspnea Scale (4 is worst)     [] MMRC 0: Dyspneic on strenuous excercise (0 points)    [] MMRC 1: Dyspneic on walking a slight hill (0 points)    [] MMRC 2: Dyspneic on walking level ground; must stop occasionally due to breathlessness (1 point)    [] MMRC 3: Must stop for breathlessness after walking 100 yards or after a few minutes (2 points)    [] MMRC 4: Cannot leave house; breathless on dressing/undressing (3 points)                          No data to display                          Objective:     Vital Signs (Most Recent)  Vital Signs  Pulse: 76  Height and Weight  Height: 5' 5" (165.1 cm)  Weight: 131.5 kg (290 lb)  BSA (Calculated - sq m): 2.46 sq meters  BMI (Calculated): 48.3  Weight in (lb) to have BMI = 25: 149.9]  Wt Readings from Last 2 Encounters:   04/01/24 131.5 kg (290 lb)   03/19/24 131.5 kg (290 lb)       Physical Exam  Vitals and nursing note reviewed.   HENT:      Head: Normocephalic.   Eyes:      Pupils: Pupils are equal, round, and reactive to light. " "  Neurological:      Mental Status: She is alert and oriented to person, place, and time.          Laboratory  Lab Results   Component Value Date    WBC 6.69 03/20/2024    RBC 4.92 03/20/2024    HGB 12.6 03/20/2024    HCT 40.7 03/20/2024    MCV 83 03/20/2024    MCH 25.6 (L) 03/20/2024    MCHC 31.0 (L) 03/20/2024    RDW 13.9 03/20/2024     03/20/2024    MPV 12.9 03/20/2024    GRAN 4.7 03/20/2024    GRAN 70.8 03/20/2024    LYMPH 1.4 03/20/2024    LYMPH 20.5 03/20/2024    MONO 0.3 03/20/2024    MONO 4.5 03/20/2024    EOS 0.2 03/20/2024    BASO 0.07 03/20/2024    EOSINOPHIL 2.8 03/20/2024    BASOPHIL 1.0 03/20/2024       BMP  Lab Results   Component Value Date     (L) 03/20/2024    K 4.1 03/20/2024    CL 99 03/20/2024    CO2 25 03/20/2024    BUN 12 03/20/2024    CREATININE 0.8 03/20/2024    CALCIUM 9.9 03/20/2024    ANIONGAP 11 03/20/2024    AST 17 03/20/2024    ALT 16 03/20/2024    PROT 7.3 03/20/2024          No results found for: "IGE"     No results found for: "ASPERGILLUS"  No results found for: "AFUMIGATUSCL"     No results found for: "ACE"     Diagnostic Results:  I have personally reviewed today the following studies:    No image results found.     PHYSICIAN INTERPRETATION AND COMMENTS: Findings are consistent with mild obstructive sleep apnea (KEIRA).  Indications of cardiac dysrhythmia are recorded. Please refer to sleep disorders clinic  CLINICAL HISTORY: 28 year old female presented with: 17 inch neck, BMI of 46, an [place] sleepiness score of 12, history of  hypertension, diabetes and symptoms of nocturnal snoring, waking up choking and witnessed apneas. Based on the  clinical history, the patient has a high pre-test probability of having Severe KEIRA.  SLEEP STUDY FINDINGS: Patient underwent a 1 night Home Sleep Test and by behavioral criteria, slept for approximately  6.87 hours , with a sleep efficiency of 98%. When considering more subtle measures of sleep disordered breathing, the  overall " respiratory disturbance index is mild(RDI=14) based on a 1% hypopnea desaturation criteria with confirmation by  surrogate arousal indicators. The apneas/hypopneas are accompanied by minimal oxygen desaturation (percent time  below 90% SpO2: 0%, Min SpO2: 86.2%). The average desaturation across all sleep disordered breathing events is 2%.  Snoring occurs for 7.6% (30 dB) of the study. The mean pulse rate is 77.1 BPM, with frequent pulse rate variability (60 events  with >= 6 BPM increase/decrease per hour).  TREATMENT CONSIDERATIONS: For a patient with mild asymptomatic KEIRA, nasal continuous positive airway pressure  (CPAP/AutoPAP) therapy or alternative therapies for treatment should be considered, i.e., Mandibular advancement splint  (MAS), referral to an ENT surgeon for modification to the airway, and/or weight loss or behavioral therapy to reduce the  potential risk of daytime somnolence, hypertension, cardiovascular disease, stroke and diabetes.  DISEASE MANAGEMENT CONSIDERATIONS: Irregularity of the pulse rate signals indicates possible cardiac dysrhythmia. If  clinically appropriate, further cardiac evaluation is suggested. Sleeping pills may increase the severity of untreated KEIRA  and their use should be reevaluated once the KEIRA is treated.      Patient Name Candida Barker Study Ordered By Kaylee Yeager  Date of Night 1 03/21/2024 07:04:40 PM Date of Birth 1995  Identification Number [id number]  Overall AHI (4%)* Overall RDI % time < 90% Sp02 Mean Sp02 % time snoring > 30dB  3 14 0% 97.2% 7.6%  PHYSICIAN INTERPRETATION AND COMMENTS: Findings are consistent with mild obstructive sleep apnea (KEIRA).  Indications of cardiac dysrhythmia are recorded. Please refer to sleep disorders clinic  CLINICAL HISTORY: 28 year old female presented with: 17 inch neck, BMI of 46, an [place] sleepiness score of 12, history of  hypertension, diabetes and symptoms of nocturnal snoring, waking up choking and witnessed apneas.  Based on the  clinical history, the patient has a high pre-test probability of having Severe KEIRA.  SLEEP STUDY FINDINGS: Patient underwent a 1 night Home Sleep Test and by behavioral criteria, slept for approximately  6.87 hours , with a sleep efficiency of 98%. When considering more subtle measures of sleep disordered breathing, the  overall respiratory disturbance index is mild(RDI=14) based on a 1% hypopnea desaturation criteria with confirmation by  surrogate arousal indicators. The apneas/hypopneas are accompanied by minimal oxygen desaturation (percent time  below 90% SpO2: 0%, Min SpO2: 86.2%). The average desaturation across all sleep disordered breathing events is 2%.  Snoring occurs for 7.6% (30 dB) of the study. The mean pulse rate is 77.1 BPM, with frequent pulse rate variability (60 events  with >= 6 BPM increase/decrease per hour).  TREATMENT CONSIDERATIONS: For a patient with mild asymptomatic KEIRA, nasal continuous positive airway pressure  (CPAP/AutoPAP) therapy or alternative therapies for treatment should be considered, i.e., Mandibular advancement splint  (MAS), referral to an ENT surgeon for modification to the airway, and/or weight loss or behavioral therapy to reduce the  potential risk of daytime somnolence, hypertension, cardiovascular disease, stroke and diabetes.  DISEASE MANAGEMENT CONSIDERATIONS: Irregularity of the pulse rate signals indicates possible cardiac dysrhythmia. If  clinically appropriate, further cardiac evaluation is suggested. Sleeping pills may increase the severity of untreated KEIRA  and their use should be reevaluated once the KEIRA is treated.  Assessment/Plan:     Problem List Items Addressed This Visit       Essential hypertension    Morbid obesity    Postablative hypothyroidism    Type 2 diabetes mellitus with hyperglycemia, with long-term current use of insulin    KEIRA (obstructive sleep apnea) - Primary     Treatment Options for Sleep Disordered Breathing discussed:      [x]    Continuous Positive Airway Pressure (CPAP).  []    Surgical options  []    Oral appliances   [x]    Behavioral approaches.   [x]    Weight loss.   []    Avoiding alcohol and sedative medication.  []    Treat other underlying medical conditions eg. nasal allergies  []     INSPIRE  []               Relevant Orders    CPAP FOR HOME USE     Other Visit Diagnoses       Mild obstructive sleep apnea                      Follow up in about 10 weeks (around 6/10/2024), or weight loss, CPAP data.    This note was prepared using voice recognition system and is likely to have sound alike errors that may have been overlooked even after proof reading.  Please call me with any questions    Discussed diagnosis, its evaluation, treatment and usual course. All questions answered.    Thank you for the courtesy of participating in the care of this patient    Brando Springer MD      Personal Diagnostic Review  []  CXR    []  ECHO    []  ONSAT    []  6MWD    []  LABS    []  CHEST CT    []  PET CT    []  Biopsy results

## 2024-04-01 NOTE — TELEPHONE ENCOUNTER
No care due was identified.  [facility] Embedded Care Due Messages. Reference number: [id number].   4/01/2024 8:33:51 AM CDT

## 2024-04-03 ENCOUNTER — TELEPHONE (OUTPATIENT)
Dept: FAMILY MEDICINE | Facility: CLINIC | Age: 29
End: 2024-04-03
Payer: COMMERCIAL

## 2024-04-04 ENCOUNTER — E-VISIT (OUTPATIENT)
Dept: FAMILY MEDICINE | Facility: CLINIC | Age: 29
End: 2024-04-04
Payer: COMMERCIAL

## 2024-04-04 ENCOUNTER — LAB VISIT (OUTPATIENT)
Dept: LAB | Facility: HOSPITAL | Age: 29
End: 2024-04-04
Attending: STUDENT IN AN ORGANIZED HEALTH CARE EDUCATION/TRAINING PROGRAM
Payer: COMMERCIAL

## 2024-04-04 DIAGNOSIS — R19.7 DIARRHEA, UNSPECIFIED TYPE: ICD-10-CM

## 2024-04-04 DIAGNOSIS — R19.7 DIARRHEA, UNSPECIFIED TYPE: Primary | ICD-10-CM

## 2024-04-04 LAB
B-HCG UR QL: NEGATIVE
BACTERIA #/AREA URNS HPF: NORMAL /HPF
BILIRUB UR QL STRIP: NEGATIVE
CLARITY UR: CLEAR
COLOR UR: YELLOW
GLUCOSE UR QL STRIP: ABNORMAL
HGB UR QL STRIP: NEGATIVE
KETONES UR QL STRIP: NEGATIVE
LEUKOCYTE ESTERASE UR QL STRIP: NEGATIVE
MICROSCOPIC COMMENT: NORMAL
NITRITE UR QL STRIP: NEGATIVE
PH UR STRIP: 6 [PH] (ref 5–8)
PROT UR QL STRIP: NEGATIVE
RBC #/AREA URNS HPF: 0 /HPF (ref 0–4)
SP GR UR STRIP: 1.02 (ref 1–1.03)
SQUAMOUS #/AREA URNS HPF: 2 /HPF
URN SPEC COLLECT METH UR: ABNORMAL
WBC #/AREA URNS HPF: 1 /HPF (ref 0–5)
YEAST URNS QL MICRO: NORMAL

## 2024-04-04 PROCEDURE — 81000 URINALYSIS NONAUTO W/SCOPE: CPT | Mod: PO | Performed by: STUDENT IN AN ORGANIZED HEALTH CARE EDUCATION/TRAINING PROGRAM

## 2024-04-04 PROCEDURE — 99421 OL DIG E/M SVC 5-10 MIN: CPT | Mod: ,,, | Performed by: STUDENT IN AN ORGANIZED HEALTH CARE EDUCATION/TRAINING PROGRAM

## 2024-04-04 PROCEDURE — 81025 URINE PREGNANCY TEST: CPT | Mod: PO | Performed by: STUDENT IN AN ORGANIZED HEALTH CARE EDUCATION/TRAINING PROGRAM

## 2024-04-04 RX ORDER — ONDANSETRON 4 MG/1
4 TABLET, ORALLY DISINTEGRATING ORAL EVERY 8 HOURS PRN
Qty: 12 TABLET | Refills: 0 | Status: SHIPPED | OUTPATIENT
Start: 2024-04-04

## 2024-04-04 NOTE — PROGRESS NOTES
Patient ID: Candida Barker is a 28 y.o. female.    Chief Complaint: GI Problem (Entered automatically based on patient selection in Patient Portal.)    The patient initiated a request through [employer] on 4/4/2024 for evaluation and management with a chief complaint of GI Problem (Entered automatically based on patient selection in Patient Portal.)     I evaluated the questionnaire submission on 04/04/2024.    Ohs Peq Evisit Diarrhea    4/4/2024  5:23 AM CDT - Filed by Patient   Do you agree to participate in an E-Visit? Yes   If you have any of the following symptoms, please present to your local ER or call 911:  I acknowledge   What is the main issue you would like addressed today? Diarrhea x4 days, nausea   Are you able to take your vital signs? Yes   Systolic Blood Pressure: 138   Diastolic Blood Pressure: 88   Weight: 290   Height: 65   Pulse: 76   Temperature: 98.4   Respiration rate:    Pulse Oxygen:    Are you pregnant, could you be pregnant, or are you breast feeding? None of the above   Do you have diarrhea? Yes   How many stools have you passed in the last 24 hours? More than eight   Is there blood in your stool, or is your stool dark red or black? None of the above   Does your stool contain pus or mucus? No   Have you taken a laxative or a medicine to help you move your bowels lately? No   Are you vomiting? No, I am not vomiting   Do you have belly pain? I have pain in the lower part of my belly   Are you feeling dizzy or like you might pass out? No   When did your symptoms begin? 4/1/2024   Do you have a fever? No, I do not have a fever   Are you having trouble walking or lifting yourself due to weakness from this illness?  No   Do any of the following apply to you? My urine is normal   Did your condition begin after a specific meal that may have caused the illness? Not clearly related to a meal.    Have you taken antibiotics recently? I have not been on any antibiotics   Have you been hospitalized in the  past 2 months? No, I have not been hospitalized recently.   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 Yes   Have you had a meal consisting of raw meat or fish in the week prior to your illness? No   Have you recently travelled to a place where you may have caught an illness? No   Have you tried any medication or other treatment for your symptoms? Yes   Please list the treatments you tried, and the result of those treatments. Loperamide 2mg   Provide any additional information you feel is important.    Please attach any relevant images or files           Active Problem List with Overview Notes    Diagnosis Date Noted    KEIRA (obstructive sleep apnea) 01/09/2024      Home sleep study      Morbid obesity 05/17/2018     Wt Readings from Last 3 Encounters:   03/19/24 1249 131.5 kg (290 lb)       Diabetes Medications               insulin lispro (HUMALOG U-100 INSULIN) 100 unit/mL Crtg Inject 90 Units into the skin 3 (three) times daily. 15 am, 20 lunch, 25 dinner    metFORMIN (GLUCOPHAGE-XR) 500 MG ER 24hr tablet Take 1,000 mg by mouth 2 (two) times daily with meals.    MOUNJARO 7.5 mg/0.5 mL PnIj Inject 7.5 mg into the skin every 7 days.    TOUJEO MAX U-300 SOLOSTAR 300 unit/mL (3 mL) insulin pen Inject 70 Units into the skin once daily.            General weight loss/lifestyle modification strategies discussed: limit sugary drinks, exercise 3-5x per week  Informal exercise measures discussed, e.g. taking stairs instead of elevator.              Essential hypertension 04/21/2017     -at goal today  - Current Hypertension Medications:   Hypertension Medications               carvediloL (COREG) 12.5 MG tablet Take 1 tablet (12.5 mg total) by mouth 2 (two) times daily.    enalapril (VASOTEC) 20 MG tablet Take 20 mg by mouth 2 (two) times daily.    hydroCHLOROthiazide (HYDRODIURIL) 12.5 MG Tab Take 1 tablet (12.5 mg total) by mouth every morning.         -continue lifestyle modification with low sodium diet and exercise   -discussed hypertension disease course and importance of treating high blood pressure  -patient understood and advised of risk of untreated blood pressure.  ER precautions were given   for symptoms of hypertensive urgency and emergency.        Hyperlipidemia 04/21/2017     -chronic condition. Currently stable.      Hyperlipidemia Medications               rosuvastatin (CRESTOR) 5 MG tablet Take 1 tablet (5 mg total) by mouth once daily.            Lab Results   Component Value Date    CHOL 231 (H) 03/20/2024    CHOL 182 05/16/2023    CHOL 201 (H) 02/17/2023     Lab Results   Component Value Date    HDL 43 03/20/2024    HDL 35 05/16/2023    HDL 40 02/17/2023     Lab Results   Component Value Date    LDLCALC 150.8 03/20/2024    LDLCALC 125 (H) 05/16/2023    LDLCALC 139 (H) 02/17/2023     Lab Results   Component Value Date    TRIG 186 (H) 03/20/2024    TRIG 110 05/16/2023    TRIG 109 02/17/2023     Lab Results   Component Value Date    CHOLHDL 18.6 (L) 03/20/2024    CHOLHDL 5.2 (H) 05/16/2023    CHOLHDL 5.0 (H) 02/17/2023          The ASCVD Risk score (Gladis BRANHAM, et al., 2019) failed to calculate for the following reasons:    The 2019 ASCVD risk score is only valid for ages 40 to 79        Postablative hypothyroidism 04/21/2017     S/p ablation  Chronic hx; stable on synthroid  - denies symptoms   Lab Results   Component Value Date    TSH 0.904 03/20/2024     - cont current dose as rxd      Type 2 diabetes mellitus with hyperglycemia, with long-term current use of insulin 04/21/2017     uncontrolled   Had been off meds for few months, restart recheck A1c 3m  Lab Results   Component Value Date    HGBA1C 9.4 (H) 03/20/2024   Hypoglycemic Events: none     -current meds:   Diabetes Medications               insulin lispro (HUMALOG U-100 INSULIN) 100 unit/mL Crtg Inject 90 Units into the skin 3 (three) times daily. 15 am, 20 lunch, 25 dinner    metFORMIN  (GLUCOPHAGE-XR) 500 MG ER 24hr tablet Take 1,000 mg by mouth 2 (two) times daily with meals.    MOUNJARO 7.5 mg/0.5 mL PnIj Inject 7.5 mg into the skin every 7 days.    TOUJEO MAX U-300 SOLOSTAR 300 unit/mL (3 mL) insulin pen Inject 70 Units into the skin once daily.        -on statin:   Hyperlipidemia Medications               rosuvastatin (CRESTOR) 5 MG tablet Take 1 tablet (5 mg total) by mouth once daily.        -on ACE-I/ARB:   Hypertension Medications               carvediloL (COREG) 12.5 MG tablet Take 1 tablet (12.5 mg total) by mouth 2 (two) times daily.    enalapril (VASOTEC) 20 MG tablet Take 20 mg by mouth 2 (two) times daily.    hydroCHLOROthiazide (HYDRODIURIL) 12.5 MG Tab Take 1 tablet (12.5 mg total) by mouth every morning.        -counseling provided on importance of diabetic diet and medication compliance in order to treat diabetes  -discussed diabetes disease course and potential complications  Follow up 3 months           Recent Labs Obtained:  No visits with results within 7 Day(s) from this visit.   Latest known visit with results is:   Lab Visit on 03/20/2024   Component Date Value Ref Range Status    Microalbumin, Urine 03/20/2024 9.0  ug/mL Final    Creatinine, Urine 03/20/2024 141.0  15.0 - 325.0 mg/dL Final    Microalb/Creat Ratio 03/20/2024 6.4  0.0 - 30.0 ug/mg Final    TSH 03/20/2024 0.904  0.400 - 4.000 uIU/mL Final    Cholesterol 03/20/2024 231 (H)  120 - 199 mg/dL Final    Comment: The National Cholesterol Education Program (NCEP) has set the  following guidelines (reference ranges) for Cholesterol:  Optimal.....................<200 mg/dL  Borderline High.............200-239 mg/dL  High........................> or = 240 mg/dL      Triglycerides 03/20/2024 186 (H)  30 - 150 mg/dL Final    Comment: The National Cholesterol Education Program (NCEP) has set the  following guidelines (reference values) for triglycerides:  Normal......................<150 mg/dL  Borderline  High.............150-199 mg/dL  High........................200-499 mg/dL      HDL 03/20/2024 43  40 - 75 mg/dL Final    Comment: The National Cholesterol Education Program (NCEP) has set the  following guidelines (reference values) for HDL Cholesterol:  Low...............<40 mg/dL  Optimal...........>60 mg/dL      LDL Cholesterol 03/20/2024 150.8  63.0 - 159.0 mg/dL Final    Comment: The National Cholesterol Education Program (NCEP) has set the  following guidelines (reference values) for LDL Cholesterol:  Optimal.......................<130 mg/dL  Borderline High...............130-159 mg/dL  High..........................160-189 mg/dL  Very High.....................>190 mg/dL      HDL/Cholesterol Ratio 03/20/2024 18.6 (L)  20.0 - 50.0 % Final    Total Cholesterol/HDL Ratio 03/20/2024 5.4 (H)  2.0 - 5.0 Final    Non-HDL Cholesterol 03/20/2024 188  mg/dL Final    Comment: Risk category and Non-HDL cholesterol goals:  Coronary heart disease (CHD)or equivalent (10-year risk of CHD >20%):  Non-HDL cholesterol goal     <130 mg/dL  Two or more CHD risk factors and 10-year risk of CHD <= 20%:  Non-HDL cholesterol goal     <160 mg/dL  0 to 1 CHD risk factor:  Non-HDL cholesterol goal     <190 mg/dL      Hemoglobin A1C 03/20/2024 9.4 (H)  4.0 - 5.6 % Final    Comment: ADA Screening Guidelines:  5.7-6.4%  Consistent with prediabetes  >or=6.5%  Consistent with diabetes    High levels of fetal hemoglobin interfere with the HbA1C  assay. Heterozygous hemoglobin variants (HbS, HgC, etc)do  not significantly interfere with this assay.   However, presence of multiple variants may affect accuracy.      Estimated Avg Glucose 03/20/2024 223 (H)  68 - 131 mg/dL Final    Sodium 03/20/2024 135 (L)  136 - 145 mmol/L Final    Potassium 03/20/2024 4.1  3.5 - 5.1 mmol/L Final    Chloride 03/20/2024 99  95 - 110 mmol/L Final    CO2 03/20/2024 25  23 - 29 mmol/L Final    Glucose 03/20/2024 288 (H)  70 - 110 mg/dL Final    BUN 03/20/2024 12  6  - 20 mg/dL Final    Creatinine 03/20/2024 0.8  0.5 - 1.4 mg/dL Final    Calcium 03/20/2024 9.9  8.7 - 10.5 mg/dL Final    Total Protein 03/20/2024 7.3  6.0 - 8.4 g/dL Final    Albumin 03/20/2024 3.8  3.5 - 5.2 g/dL Final    Total Bilirubin 03/20/2024 0.4  0.1 - 1.0 mg/dL Final    Comment: For infants and newborns, interpretation of results should be based  on gestational age, weight and in agreement with clinical  observations.    Premature Infant recommended reference ranges:  Up to 24 hours.............<8.0 mg/dL  Up to 48 hours............<12.0 mg/dL  3-5 days..................<15.0 mg/dL  6-29 days.................<15.0 mg/dL      Alkaline Phosphatase 03/20/2024 105  55 - 135 U/L Final    AST 03/20/2024 17  10 - 40 U/L Final    ALT 03/20/2024 16  10 - 44 U/L Final    eGFR 03/20/2024 >60.0  >60 mL/min/1.73 m^2 Final    Anion Gap 03/20/2024 11  8 - 16 mmol/L Final    WBC 03/20/2024 6.69  3.90 - 12.70 K/uL Final    RBC 03/20/2024 4.92  4.00 - 5.40 M/uL Final    Hemoglobin 03/20/2024 12.6  12.0 - 16.0 g/dL Final    Hematocrit 03/20/2024 40.7  37.0 - 48.5 % Final    MCV 03/20/2024 83  82 - 98 fL Final    MCH 03/20/2024 25.6 (L)  27.0 - 31.0 pg Final    MCHC 03/20/2024 31.0 (L)  32.0 - 36.0 g/dL Final    RDW 03/20/2024 13.9  11.5 - 14.5 % Final    Platelets 03/20/2024 288  150 - 450 K/uL Final    MPV 03/20/2024 12.9  9.2 - 12.9 fL Final    Immature Granulocytes 03/20/2024 0.4  0.0 - 0.5 % Final    Gran # (ANC) 03/20/2024 4.7  1.8 - 7.7 K/uL Final    Immature Grans (Abs) 03/20/2024 0.03  0.00 - 0.04 K/uL Final    Comment: Mild elevation in immature granulocytes is non specific and   can be seen in a variety of conditions including stress response,   acute inflammation, trauma and pregnancy. Correlation with other   laboratory and clinical findings is essential.      Lymph # 03/20/2024 1.4  1.0 - 4.8 K/uL Final    Mono # 03/20/2024 0.3  0.3 - 1.0 K/uL Final    Eos # 03/20/2024 0.2  0.0 - 0.5 K/uL Final    Baso #  2024 0.07  0.00 - 0.20 K/uL Final    nRBC 2024 0  0 /100 WBC Final    Gran % 2024 70.8  38.0 - 73.0 % Final    Lymph % 2024 20.5  18.0 - 48.0 % Final    Mono % 2024 4.5  4.0 - 15.0 % Final    Eosinophil % 2024 2.8  0.0 - 8.0 % Final    Basophil % 2024 1.0  0.0 - 1.9 % Final    Differential Method 2024 Automated   Final    HIV 1/2 Ag/Ab 2024 Non-reactive  Non-reactive Final       Encounter Diagnosis   Name Primary?    Diarrhea, unspecified type Yes        Orders Placed This Encounter   Procedures    Urinalysis, Reflex to Urine Culture Urine, Clean Catch     Standing Status:   Future     Standing Expiration Date:   6/3/2025     Order Specific Question:   Preferred Collection Type     Answer:   Urine, Clean Catch     Order Specific Question:   Specimen Source     Answer:   Urine    Pregnancy, urine rapid           Order Specific Question:   Specimen Source     Answer:   Urine      Medications Ordered This Encounter   Medications    ondansetron (ZOFRAN-ODT) 4 MG TbDL     Sig: Take 1 tablet (4 mg total) by mouth every 8 (eight) hours as needed (nausea/vomiting).     Dispense:  12 tablet     Refill:  0        E-Visit Time Trackin min

## 2024-04-05 NOTE — PROGRESS NOTES
I have sent a msg to patient with the following interpretation (see below):    Urine with glucose (we are working to better control your diabetes). No symptoms of infection. Are you feeling better?    Please do not hesitate to call or message with any questions or concerns    Kaylee Yeager MD

## 2024-04-08 RX ORDER — ENALAPRIL MALEATE 20 MG/1
20 TABLET ORAL 2 TIMES DAILY
Qty: 180 TABLET | Refills: 3 | Status: SHIPPED | OUTPATIENT
Start: 2024-04-08 | End: 2025-04-03

## 2024-04-08 NOTE — TELEPHONE ENCOUNTER
No care due was identified.  [facility] Embedded Care Due Messages. Reference number: [id number].   4/08/2024 2:13:39 PM CDT

## 2024-04-08 NOTE — TELEPHONE ENCOUNTER
I spoke with the patient about this. Pt requesting 90 day supply. Please review and sign pended orders if you agree

## 2024-04-10 DIAGNOSIS — Z79.4 TYPE 2 DIABETES MELLITUS WITH HYPERGLYCEMIA, WITH LONG-TERM CURRENT USE OF INSULIN: Primary | ICD-10-CM

## 2024-04-10 DIAGNOSIS — E11.65 TYPE 2 DIABETES MELLITUS WITH HYPERGLYCEMIA, WITH LONG-TERM CURRENT USE OF INSULIN: Primary | ICD-10-CM

## 2024-04-10 RX ORDER — BLOOD-GLUCOSE,RECEIVER,CONT
EACH MISCELLANEOUS
Qty: 1 EACH | Refills: 0 | Status: SHIPPED | OUTPATIENT
Start: 2024-04-10

## 2024-04-10 RX ORDER — BLOOD-GLUCOSE TRANSMITTER
EACH MISCELLANEOUS
Qty: 1 EACH | Refills: 0 | Status: SHIPPED | OUTPATIENT
Start: 2024-04-10

## 2024-04-10 RX ORDER — BLOOD-GLUCOSE SENSOR
EACH MISCELLANEOUS
Qty: 3 EACH | Refills: 11 | Status: SHIPPED | OUTPATIENT
Start: 2024-04-10

## 2024-04-10 NOTE — TELEPHONE ENCOUNTER
No care due was identified.  [facility] Embedded Care Due Messages. Reference number: [id number].   4/10/2024 2:09:28 PM CDT

## 2024-04-23 ENCOUNTER — TELEPHONE (OUTPATIENT)
Dept: FAMILY MEDICINE | Facility: CLINIC | Age: 29
End: 2024-04-23
Payer: COMMERCIAL

## 2024-04-23 DIAGNOSIS — G56.92 NEUROPATHY OF HAND, LEFT: Primary | ICD-10-CM

## 2024-04-23 RX ORDER — GABAPENTIN 300 MG/1
300 CAPSULE ORAL NIGHTLY
Qty: 30 CAPSULE | Refills: 2 | Status: SHIPPED | OUTPATIENT
Start: 2024-04-23

## 2024-04-23 NOTE — TELEPHONE ENCOUNTER
Orders Placed This Encounter   Procedures    EMG W/ ULTRASOUND AND NERVE CONDUCTION TEST 1 Extremity     Standing Status:   Future     Standing Expiration Date:   4/23/2025     Order Specific Question:   Number of Extremities     Answer:   1 Extremity     Order Specific Question:   Reason for Procedure:     Answer:   Diabetic neuropathy       Medications Ordered This Encounter   Medications    gabapentin (NEURONTIN) 300 MG capsule     Sig: Take 1 capsule (300 mg total) by mouth every evening.     Dispense:  30 capsule     Refill:  2

## 2024-04-23 NOTE — TELEPHONE ENCOUNTER
"Pt c/o Left hand that is "tingly and going numb" constantly. Stated she was unable to feel the heat at all in her left hand under her hot bowl of food for lunch. Please advise.  "

## 2024-04-26 ENCOUNTER — E-VISIT (OUTPATIENT)
Dept: FAMILY MEDICINE | Facility: CLINIC | Age: 29
End: 2024-04-26
Payer: COMMERCIAL

## 2024-04-26 DIAGNOSIS — R06.2 WHEEZING: Primary | ICD-10-CM

## 2024-04-26 PROCEDURE — 99421 OL DIG E/M SVC 5-10 MIN: CPT | Mod: ,,, | Performed by: STUDENT IN AN ORGANIZED HEALTH CARE EDUCATION/TRAINING PROGRAM

## 2024-04-26 RX ORDER — ALBUTEROL SULFATE 90 UG/1
2 AEROSOL, METERED RESPIRATORY (INHALATION) EVERY 6 HOURS PRN
Qty: 18 G | Refills: 1 | Status: SHIPPED | OUTPATIENT
Start: 2024-04-26

## 2024-04-26 RX ORDER — METHYLPREDNISOLONE 4 MG/1
TABLET ORAL
Qty: 21 TABLET | Refills: 0 | Status: SHIPPED | OUTPATIENT
Start: 2024-04-26 | End: 2024-04-26

## 2024-04-26 NOTE — PROGRESS NOTES
Patient ID: Candida Barker is a 29 y.o. female.    Chief Complaint: URI (Entered automatically based on patient selection in Patient Portal.)    The patient initiated a request through [employer] on 4/26/2024 for evaluation and management with a chief complaint of URI (Entered automatically based on patient selection in Patient Portal.)     I evaluated the questionnaire submission on 04/26/2024  .    Ohs Peq Evisit Upper Respitatory/Cough Questionnaire    4/26/2024 12:58 PM CDT - Filed by Patient   Do you agree to participate in an E-Visit? Yes   If you have any of the following symptoms, please present to your local ER or call 911:  I acknowledge   What is the main issue you would like addressed today? Cough   Are you able to take your vital signs? Yes   Systolic Blood Pressure: 136   Diastolic Blood Pressure: 83   Weight: 290   Height: 65   Pulse: 89   Temperature:    Respiration rate:    Pulse Oxygen: 99   Are you pregnant, could you be pregnant, or are you breast feeding? None of the above   What symptoms do you currently have?  Cough;  Headache   Describe your cough: Productive (containing mucus)   Describe the mucus: Clear   Have you ever smoked? I have never smoked   Have you had a fever? No   When did your symptoms first appear? 4/20/2024   In the last two weeks, have you been in close contact with someone who has COVID-19 or the Flu? No   In the last two weeks, have you worked or volunteered in a healthcare facility or as a ? Healthcare facilities include a hospital, medical or dental clinic, long-term care facility, or nursing home Yes   Do you live in a long-term care facility, nursing home, group home, or homeless shelter? No   List what you have done or taken to help your symptoms. Currently on Keflex for Strep dx 4/20.   How severe are your symptoms? Mild   Have your symptoms improved since they first appeared? Better   Have you taken an at home Covid test? No   Have you taken a Flu test? No    Have you been fully vaccinated for COVID? (2 Pfizer, 2 Moderna or 1 [employer] vaccine injections) Yes   Have you received a booster? No   Have you recieved a Flu shot? Yes   When did you recieve your Flu shot? 10/13/2023   Do you have transportation to get tested for COVID if it is indicated and ordered for you at an [facility]? Yes   Provide any additional information you feel is important. Wheezing with laugh, decent coughing   Please attach any relevant images or files           Active Problem List with Overview Notes    Diagnosis Date Noted    KEIRA (obstructive sleep apnea) 01/09/2024      Home sleep study      Morbid obesity 05/17/2018     Wt Readings from Last 3 Encounters:   03/19/24 1249 131.5 kg (290 lb)       Diabetes Medications               insulin lispro (HUMALOG U-100 INSULIN) 100 unit/mL Crtg Inject 90 Units into the skin 3 (three) times daily. 15 am, 20 lunch, 25 dinner    metFORMIN (GLUCOPHAGE-XR) 500 MG ER 24hr tablet Take 1,000 mg by mouth 2 (two) times daily with meals.    MOUNJARO 7.5 mg/0.5 mL PnIj Inject 7.5 mg into the skin every 7 days.    TOUJEO MAX U-300 SOLOSTAR 300 unit/mL (3 mL) insulin pen Inject 70 Units into the skin once daily.            General weight loss/lifestyle modification strategies discussed: limit sugary drinks, exercise 3-5x per week  Informal exercise measures discussed, e.g. taking stairs instead of elevator.              Essential hypertension 04/21/2017     -at goal today  - Current Hypertension Medications:   Hypertension Medications               carvediloL (COREG) 12.5 MG tablet Take 1 tablet (12.5 mg total) by mouth 2 (two) times daily.    enalapril (VASOTEC) 20 MG tablet Take 20 mg by mouth 2 (two) times daily.    hydroCHLOROthiazide (HYDRODIURIL) 12.5 MG Tab Take 1 tablet (12.5 mg total) by mouth every morning.          -continue lifestyle modification with low sodium diet and exercise   -discussed hypertension disease course and importance of  treating high blood pressure  -patient understood and advised of risk of untreated blood pressure.  ER precautions were given   for symptoms of hypertensive urgency and emergency.        Hyperlipidemia 04/21/2017     -chronic condition. Currently stable.      Hyperlipidemia Medications               rosuvastatin (CRESTOR) 5 MG tablet Take 1 tablet (5 mg total) by mouth once daily.              Lab Results   Component Value Date    CHOL 231 (H) 03/20/2024    CHOL 182 05/16/2023    CHOL 201 (H) 02/17/2023     Lab Results   Component Value Date    HDL 43 03/20/2024    HDL 35 05/16/2023    HDL 40 02/17/2023     Lab Results   Component Value Date    LDLCALC 150.8 03/20/2024    LDLCALC 125 (H) 05/16/2023    LDLCALC 139 (H) 02/17/2023     Lab Results   Component Value Date    TRIG 186 (H) 03/20/2024    TRIG 110 05/16/2023    TRIG 109 02/17/2023     Lab Results   Component Value Date    CHOLHDL 18.6 (L) 03/20/2024    CHOLHDL 5.2 (H) 05/16/2023    CHOLHDL 5.0 (H) 02/17/2023          The ASCVD Risk score (Gladis BRANHAM, et al., 2019) failed to calculate for the following reasons:    The 2019 ASCVD risk score is only valid for ages 40 to 79        Postablative hypothyroidism 04/21/2017     S/p ablation  Chronic hx; stable on synthroid  - denies symptoms   Lab Results   Component Value Date    TSH 0.904 03/20/2024       - cont current dose as rxd      Type 2 diabetes mellitus with hyperglycemia, with long-term current use of insulin 04/21/2017     uncontrolled   Had been off meds for few months, restart recheck A1c 3m  Lab Results   Component Value Date    HGBA1C 9.4 (H) 03/20/2024     Hypoglycemic Events: none     -current meds:   Diabetes Medications               insulin lispro (HUMALOG U-100 INSULIN) 100 unit/mL Crtg Inject 90 Units into the skin 3 (three) times daily. 15 am, 20 lunch, 25 dinner    metFORMIN (GLUCOPHAGE-XR) 500 MG ER 24hr tablet Take 1,000 mg by mouth 2 (two) times daily with meals.    MOUNJARO 7.5 mg/0.5 mL  PnIj Inject 7.5 mg into the skin every 7 days.    TOUJEO MAX U-300 SOLOSTAR 300 unit/mL (3 mL) insulin pen Inject 70 Units into the skin once daily.          -on statin:   Hyperlipidemia Medications               rosuvastatin (CRESTOR) 5 MG tablet Take 1 tablet (5 mg total) by mouth once daily.          -on ACE-I/ARB:   Hypertension Medications               carvediloL (COREG) 12.5 MG tablet Take 1 tablet (12.5 mg total) by mouth 2 (two) times daily.    enalapril (VASOTEC) 20 MG tablet Take 20 mg by mouth 2 (two) times daily.    hydroCHLOROthiazide (HYDRODIURIL) 12.5 MG Tab Take 1 tablet (12.5 mg total) by mouth every morning.          -counseling provided on importance of diabetic diet and medication compliance in order to treat diabetes  -discussed diabetes disease course and potential complications  Follow up 3 months           Recent Labs Obtained:  No visits with results within 7 Day(s) from this visit.   Latest known visit with results is:   Lab Visit on 04/04/2024   Component Date Value Ref Range Status    Specimen UA 04/04/2024 Urine, Clean Catch   Final    Color, UA 04/04/2024 Yellow  Yellow, Straw, Maryjane Final    Appearance, UA 04/04/2024 Clear  Clear Final    pH, UA 04/04/2024 6.0  5.0 - 8.0 Final    Specific Gravity, UA 04/04/2024 1.020  1.005 - 1.030 Final    Protein, UA 04/04/2024 Negative  Negative Final    Comment: Recommend a 24 hour urine protein or a urine   protein/creatinine ratio if globulin induced proteinuria is  clinically suspected.      Glucose, UA 04/04/2024 3+ (A)  Negative Final    Ketones, UA 04/04/2024 Negative  Negative Final    Bilirubin (UA) 04/04/2024 Negative  Negative Final    Occult Blood UA 04/04/2024 Negative  Negative Final    Nitrite, UA 04/04/2024 Negative  Negative Final    Leukocytes, UA 04/04/2024 Negative  Negative Final    RBC, UA 04/04/2024 0  0 - 4 /hpf Final    WBC, UA 04/04/2024 1  0 - 5 /hpf Final    Bacteria 04/04/2024 Occasional  None-Occ /hpf Final    Yeast,  UA 2024 None  None Final    Squam Epithel, UA 2024 2  /hpf Final    Microscopic Comment 2024 SEE COMMENT   Final    Comment: Other formed elements not mentioned in the report are not   present in the microscopic examination.          Encounter Diagnosis   Name Primary?    Wheezing Yes        No orders of the defined types were placed in this encounter.     Medications Ordered This Encounter   Medications    albuterol (PROVENTIL/VENTOLIN HFA) 90 mcg/actuation inhaler     Sig: Inhale 2 puffs into the lungs every 6 (six) hours as needed for Wheezing.     Dispense:  18 g     Refill:  1        E-Visit Time Trackin min

## 2024-04-29 ENCOUNTER — LAB VISIT (OUTPATIENT)
Dept: LAB | Facility: HOSPITAL | Age: 29
End: 2024-04-29
Attending: STUDENT IN AN ORGANIZED HEALTH CARE EDUCATION/TRAINING PROGRAM
Payer: COMMERCIAL

## 2024-04-29 ENCOUNTER — OFFICE VISIT (OUTPATIENT)
Dept: RHEUMATOLOGY | Facility: CLINIC | Age: 29
End: 2024-04-29
Payer: COMMERCIAL

## 2024-04-29 VITALS
DIASTOLIC BLOOD PRESSURE: 94 MMHG | HEIGHT: 65 IN | HEART RATE: 77 BPM | SYSTOLIC BLOOD PRESSURE: 153 MMHG | WEIGHT: 292.31 LBS | BODY MASS INDEX: 48.7 KG/M2

## 2024-04-29 DIAGNOSIS — L40.50 PSORIATIC ARTHRITIS: ICD-10-CM

## 2024-04-29 DIAGNOSIS — L40.50 PSORIATIC ARTHRITIS: Primary | ICD-10-CM

## 2024-04-29 DIAGNOSIS — L40.9 PSORIASIS: ICD-10-CM

## 2024-04-29 LAB
ALBUMIN SERPL BCP-MCNC: 4 G/DL (ref 3.5–5.2)
ALP SERPL-CCNC: 89 U/L (ref 55–135)
ALT SERPL W/O P-5'-P-CCNC: 16 U/L (ref 10–44)
ANION GAP SERPL CALC-SCNC: 14 MMOL/L (ref 8–16)
AST SERPL-CCNC: 15 U/L (ref 10–40)
BASOPHILS # BLD AUTO: 0.05 K/UL (ref 0–0.2)
BASOPHILS NFR BLD: 0.4 % (ref 0–1.9)
BILIRUB SERPL-MCNC: 0.3 MG/DL (ref 0.1–1)
BUN SERPL-MCNC: 14 MG/DL (ref 6–20)
CALCIUM SERPL-MCNC: 9.6 MG/DL (ref 8.7–10.5)
CHLORIDE SERPL-SCNC: 101 MMOL/L (ref 95–110)
CO2 SERPL-SCNC: 22 MMOL/L (ref 23–29)
CREAT SERPL-MCNC: 0.7 MG/DL (ref 0.5–1.4)
CRP SERPL-MCNC: 3.5 MG/L (ref 0–8.2)
DIFFERENTIAL METHOD BLD: ABNORMAL
EOSINOPHIL # BLD AUTO: 0 K/UL (ref 0–0.5)
EOSINOPHIL NFR BLD: 0 % (ref 0–8)
ERYTHROCYTE [DISTWIDTH] IN BLOOD BY AUTOMATED COUNT: 13.7 % (ref 11.5–14.5)
ERYTHROCYTE [SEDIMENTATION RATE] IN BLOOD BY PHOTOMETRIC METHOD: 46 MM/HR (ref 0–36)
EST. GFR  (NO RACE VARIABLE): >60 ML/MIN/1.73 M^2
GLUCOSE SERPL-MCNC: 178 MG/DL (ref 70–110)
HBV CORE AB SERPL QL IA: NORMAL
HBV SURFACE AG SERPL QL IA: NORMAL
HCT VFR BLD AUTO: 39.6 % (ref 37–48.5)
HGB BLD-MCNC: 12.1 G/DL (ref 12–16)
IMM GRANULOCYTES # BLD AUTO: 0.07 K/UL (ref 0–0.04)
IMM GRANULOCYTES NFR BLD AUTO: 0.6 % (ref 0–0.5)
LYMPHOCYTES # BLD AUTO: 1.1 K/UL (ref 1–4.8)
LYMPHOCYTES NFR BLD: 9.2 % (ref 18–48)
MCH RBC QN AUTO: 25.5 PG (ref 27–31)
MCHC RBC AUTO-ENTMCNC: 30.6 G/DL (ref 32–36)
MCV RBC AUTO: 84 FL (ref 82–98)
MONOCYTES # BLD AUTO: 0.3 K/UL (ref 0.3–1)
MONOCYTES NFR BLD: 2.5 % (ref 4–15)
NEUTROPHILS # BLD AUTO: 10.3 K/UL (ref 1.8–7.7)
NEUTROPHILS NFR BLD: 87.3 % (ref 38–73)
NRBC BLD-RTO: 0 /100 WBC
PLATELET # BLD AUTO: 316 K/UL (ref 150–450)
PMV BLD AUTO: 11.9 FL (ref 9.2–12.9)
POTASSIUM SERPL-SCNC: 3.7 MMOL/L (ref 3.5–5.1)
PROT SERPL-MCNC: 8.2 G/DL (ref 6–8.4)
RBC # BLD AUTO: 4.74 M/UL (ref 4–5.4)
SODIUM SERPL-SCNC: 137 MMOL/L (ref 136–145)
WBC # BLD AUTO: 11.77 K/UL (ref 3.9–12.7)

## 2024-04-29 PROCEDURE — 4010F ACE/ARB THERAPY RXD/TAKEN: CPT | Mod: CPTII,S$GLB,, | Performed by: STUDENT IN AN ORGANIZED HEALTH CARE EDUCATION/TRAINING PROGRAM

## 2024-04-29 PROCEDURE — 85652 RBC SED RATE AUTOMATED: CPT | Performed by: STUDENT IN AN ORGANIZED HEALTH CARE EDUCATION/TRAINING PROGRAM

## 2024-04-29 PROCEDURE — 3046F HEMOGLOBIN A1C LEVEL >9.0%: CPT | Mod: CPTII,S$GLB,, | Performed by: STUDENT IN AN ORGANIZED HEALTH CARE EDUCATION/TRAINING PROGRAM

## 2024-04-29 PROCEDURE — 87340 HEPATITIS B SURFACE AG IA: CPT | Performed by: STUDENT IN AN ORGANIZED HEALTH CARE EDUCATION/TRAINING PROGRAM

## 2024-04-29 PROCEDURE — 86480 TB TEST CELL IMMUN MEASURE: CPT | Performed by: STUDENT IN AN ORGANIZED HEALTH CARE EDUCATION/TRAINING PROGRAM

## 2024-04-29 PROCEDURE — 3066F NEPHROPATHY DOC TX: CPT | Mod: CPTII,S$GLB,, | Performed by: STUDENT IN AN ORGANIZED HEALTH CARE EDUCATION/TRAINING PROGRAM

## 2024-04-29 PROCEDURE — 36415 COLL VENOUS BLD VENIPUNCTURE: CPT | Performed by: STUDENT IN AN ORGANIZED HEALTH CARE EDUCATION/TRAINING PROGRAM

## 2024-04-29 PROCEDURE — 86140 C-REACTIVE PROTEIN: CPT | Performed by: STUDENT IN AN ORGANIZED HEALTH CARE EDUCATION/TRAINING PROGRAM

## 2024-04-29 PROCEDURE — 3008F BODY MASS INDEX DOCD: CPT | Mod: CPTII,S$GLB,, | Performed by: STUDENT IN AN ORGANIZED HEALTH CARE EDUCATION/TRAINING PROGRAM

## 2024-04-29 PROCEDURE — 3061F NEG MICROALBUMINURIA REV: CPT | Mod: CPTII,S$GLB,, | Performed by: STUDENT IN AN ORGANIZED HEALTH CARE EDUCATION/TRAINING PROGRAM

## 2024-04-29 PROCEDURE — 99205 OFFICE O/P NEW HI 60 MIN: CPT | Mod: S$GLB,,, | Performed by: STUDENT IN AN ORGANIZED HEALTH CARE EDUCATION/TRAINING PROGRAM

## 2024-04-29 PROCEDURE — 1159F MED LIST DOCD IN RCRD: CPT | Mod: CPTII,S$GLB,, | Performed by: STUDENT IN AN ORGANIZED HEALTH CARE EDUCATION/TRAINING PROGRAM

## 2024-04-29 PROCEDURE — 3080F DIAST BP >= 90 MM HG: CPT | Mod: CPTII,S$GLB,, | Performed by: STUDENT IN AN ORGANIZED HEALTH CARE EDUCATION/TRAINING PROGRAM

## 2024-04-29 PROCEDURE — 80053 COMPREHEN METABOLIC PANEL: CPT | Performed by: STUDENT IN AN ORGANIZED HEALTH CARE EDUCATION/TRAINING PROGRAM

## 2024-04-29 PROCEDURE — 85025 COMPLETE CBC W/AUTO DIFF WBC: CPT | Performed by: STUDENT IN AN ORGANIZED HEALTH CARE EDUCATION/TRAINING PROGRAM

## 2024-04-29 PROCEDURE — 3077F SYST BP >= 140 MM HG: CPT | Mod: CPTII,S$GLB,, | Performed by: STUDENT IN AN ORGANIZED HEALTH CARE EDUCATION/TRAINING PROGRAM

## 2024-04-29 PROCEDURE — 99999 PR PBB SHADOW E&M-EST. PATIENT-LVL V: CPT | Mod: PBBFAC,,, | Performed by: STUDENT IN AN ORGANIZED HEALTH CARE EDUCATION/TRAINING PROGRAM

## 2024-04-29 PROCEDURE — 86704 HEP B CORE ANTIBODY TOTAL: CPT | Performed by: STUDENT IN AN ORGANIZED HEALTH CARE EDUCATION/TRAINING PROGRAM

## 2024-04-29 RX ORDER — APREMILAST 10-20-30MG
KIT ORAL
Qty: 55 TABLET | Refills: 0 | Status: ACTIVE | OUTPATIENT
Start: 2024-04-29 | End: 2024-06-03 | Stop reason: ALTCHOICE

## 2024-04-29 RX ORDER — CEPHALEXIN 500 MG/1
500 CAPSULE ORAL 2 TIMES DAILY
COMMUNITY
Start: 2024-04-20 | End: 2024-04-29

## 2024-04-29 RX ORDER — METHYLPREDNISOLONE 4 MG/1
TABLET ORAL
COMMUNITY
Start: 2024-04-26 | End: 2024-04-29

## 2024-04-29 RX ORDER — APREMILAST 30 MG/1
30 TABLET, FILM COATED ORAL 2 TIMES DAILY
Qty: 90 TABLET | Refills: 1 | Status: ACTIVE | OUTPATIENT
Start: 2024-04-29

## 2024-04-29 NOTE — PROGRESS NOTES
"Subjective:      Patient ID: Candida Barker is a 29 y.o. female.    Chief Complaint: Psoriasis    HPI:   Patient with DM, HTN, KEIRA, hypothyroidism s/p FOX for Graves Disease in 2012, psoriasis, and psoriatic arthritis, presents to establish care with Rheumatology for psoriatic arthritis. She was previously seeing Dr. Carl but her insurance changed. She had been on Otelza but has been out of it for 6 months. She's having recurrence of psoriasis on scalp and it appears on the face when she's stressed, all since she's been off Otezla. Once she had psoriasis in the nares, seen by ENT in the nose by scope. No nail, palm, sole, or other involvement with psoriasis.  Joints are hurting since being off Otezla: MCPs, PIPs, low back, pelvis. Low back pain sometimes wakes her up at night.  Sometimes hand joints swell, PIPs.  Endorses occasional dactylitis raimundo middle fingers.  Morning stiffness >1 hr. Affects arms, legs, back. Also stiff at the end of the day.    Denies oral ulcers, patchy alopecia, sicca symptoms, cardiopulmonary symptoms, eye inflammation, IBD, fevers, weight loss, Raynaud's. Rheumatology review of systems is otherwise negative.      Social Hx: Never smoker. Rare alcohol use. Works as LPN at [employer].  Family Hx of lupus, Sjogren's, Raynaud's.      Objective:   BP (!) 153/94   Pulse 77   Ht 5' 5" (1.651 m)   Wt 132.6 kg (292 lb 5.3 oz)   LMP 04/13/2024   BMI 48.65 kg/m²   Physical Exam  Constitutional:       General: She is not in acute distress.     Appearance: Normal appearance.   HENT:      Head: Normocephalic and atraumatic.      Mouth/Throat:      Mouth: Mucous membranes are moist.      Pharynx: Oropharynx is clear.   Cardiovascular:      Rate and Rhythm: Normal rate and regular rhythm.   Pulmonary:      Effort: Pulmonary effort is normal.      Breath sounds: Normal breath sounds.   Abdominal:      Palpations: Abdomen is soft.      Tenderness: There is no abdominal tenderness. "   Musculoskeletal:         General: Tenderness (PIPs, MCPs) present. No swelling.      Cervical back: Normal range of motion. No tenderness.   Skin:     General: Skin is warm and dry.   Neurological:      Mental Status: She is alert and oriented to person, place, and time. Mental status is at baseline.           Assessment:     1. Psoriatic arthritis    2. Psoriasis          Plan:     Problem List Items Addressed This Visit    None  Visit Diagnoses       Psoriatic arthritis    -  Primary    Relevant Medications    apremilast (OTEZLA STARTER) 10 mg (4)-20 mg (4)-30 mg (47) DsPk    apremilast (OTEZLA) 30 mg Tab    Other Relevant Orders    C-Reactive Protein    Sedimentation rate    Comprehensive Metabolic Panel    CBC Auto Differential    Hepatitis B Core Antibody, Total    Hepatitis B Surface Antigen    Quantiferon Gold TB    Psoriasis        Relevant Medications    apremilast (OTEZLA STARTER) 10 mg (4)-20 mg (4)-30 mg (47) DsPk    apremilast (OTEZLA) 30 mg Tab    Other Relevant Orders    C-Reactive Protein    Sedimentation rate    Comprehensive Metabolic Panel    CBC Auto Differential    Hepatitis B Core Antibody, Total    Hepatitis B Surface Antigen    Quantiferon Gold TB            Patient with DM, HTN, KEIRA, hypothyroidism s/p FOX for Graves Disease in 2012, psoriasis, and psoriatic arthritis, presents to establish care with Rheumatology for psoriatic arthritis.     Failed sulfasalazine. Other csDMARDs contraindicated because of trying for pregnancy. Otezla worked and resolved her rash and joint pain in the past and she is having a psoriasis and psoriatic arthritis flare with being off Otezla.          Plan:  Labs today: ESR, CRP, RF, CCP, BROOKE, HepB, TB  Resume Otezla - script for starter pack and continuing medication sent to [facility]      Follow up in about 4 months (around 8/29/2024).

## 2024-04-30 LAB
GAMMA INTERFERON BACKGROUND BLD IA-ACNC: 0 IU/ML
M TB IFN-G CD4+ BCKGRND COR BLD-ACNC: 0 IU/ML
M TB IFN-G CD4+ BCKGRND COR BLD-ACNC: 0 IU/ML
MITOGEN IGNF BCKGRD COR BLD-ACNC: 0.46 IU/ML
TB GOLD PLUS: ABNORMAL

## 2024-05-03 ENCOUNTER — PATIENT MESSAGE (OUTPATIENT)
Dept: RHEUMATOLOGY | Facility: CLINIC | Age: 29
End: 2024-05-03
Payer: COMMERCIAL

## 2024-05-03 ENCOUNTER — TELEPHONE (OUTPATIENT)
Dept: RHEUMATOLOGY | Facility: CLINIC | Age: 29
End: 2024-05-03
Payer: COMMERCIAL

## 2024-05-03 ENCOUNTER — PATIENT MESSAGE (OUTPATIENT)
Dept: PULMONOLOGY | Facility: CLINIC | Age: 29
End: 2024-05-03
Payer: COMMERCIAL

## 2024-05-03 DIAGNOSIS — L40.50 PSORIATIC ARTHRITIS: Primary | ICD-10-CM

## 2024-05-03 NOTE — TELEPHONE ENCOUNTER
I called patient to let her know I needed to schedule the lab work that Dr. Correa  ordered. I told her the lab was was done only at our [facility].I let her know they can't do it on Friday. She said she will talk to her boss and call back and leave me a message when she can come.

## 2024-05-06 ENCOUNTER — LAB VISIT (OUTPATIENT)
Dept: LAB | Facility: HOSPITAL | Age: 29
End: 2024-05-06
Attending: STUDENT IN AN ORGANIZED HEALTH CARE EDUCATION/TRAINING PROGRAM
Payer: COMMERCIAL

## 2024-05-06 DIAGNOSIS — L40.50 PSORIATIC ARTHRITIS: ICD-10-CM

## 2024-05-06 PROCEDURE — 86481 TB AG RESPONSE T-CELL SUSP: CPT | Performed by: STUDENT IN AN ORGANIZED HEALTH CARE EDUCATION/TRAINING PROGRAM

## 2024-05-06 PROCEDURE — 36415 COLL VENOUS BLD VENIPUNCTURE: CPT | Performed by: STUDENT IN AN ORGANIZED HEALTH CARE EDUCATION/TRAINING PROGRAM

## 2024-05-09 ENCOUNTER — TELEPHONE (OUTPATIENT)
Dept: FAMILY MEDICINE | Facility: CLINIC | Age: 29
End: 2024-05-09
Payer: COMMERCIAL

## 2024-05-09 ENCOUNTER — CLINICAL SUPPORT (OUTPATIENT)
Dept: FAMILY MEDICINE | Facility: CLINIC | Age: 29
End: 2024-05-09
Payer: COMMERCIAL

## 2024-05-09 VITALS — SYSTOLIC BLOOD PRESSURE: 116 MMHG | DIASTOLIC BLOOD PRESSURE: 68 MMHG

## 2024-05-09 DIAGNOSIS — J02.0 STREP PHARYNGITIS: Primary | ICD-10-CM

## 2024-05-09 PROBLEM — L40.0 PLAQUE PSORIASIS: Status: ACTIVE | Noted: 2024-05-09

## 2024-05-09 PROCEDURE — 99999 PR PBB SHADOW E&M-EST. PATIENT-LVL I: CPT | Mod: PBBFAC,,,

## 2024-05-09 PROCEDURE — 96372 THER/PROPH/DIAG INJ SC/IM: CPT | Mod: S$GLB,,, | Performed by: STUDENT IN AN ORGANIZED HEALTH CARE EDUCATION/TRAINING PROGRAM

## 2024-05-09 NOTE — TELEPHONE ENCOUNTER
No orders of the defined types were placed in this encounter.      Medications Ordered This Encounter   Medications    penicillin G benzathine (BICILLIN LA) injection 1.2 Million Units

## 2024-05-09 NOTE — TELEPHONE ENCOUNTER
Pt reports positive strep test and is requesting injection to help with her symptoms. Please advise

## 2024-05-10 ENCOUNTER — OFFICE VISIT (OUTPATIENT)
Dept: PHYSICAL MEDICINE AND REHAB | Facility: CLINIC | Age: 29
End: 2024-05-10
Payer: COMMERCIAL

## 2024-05-10 VITALS — HEIGHT: 65 IN | RESPIRATION RATE: 13 BRPM | BODY MASS INDEX: 48.7 KG/M2 | WEIGHT: 292.31 LBS

## 2024-05-10 DIAGNOSIS — G56.03 BILATERAL CARPAL TUNNEL SYNDROME: ICD-10-CM

## 2024-05-10 PROCEDURE — 99999 PR PBB SHADOW E&M-EST. PATIENT-LVL III: CPT | Mod: PBBFAC,,, | Performed by: PHYSICAL MEDICINE & REHABILITATION

## 2024-05-10 PROCEDURE — 99499 UNLISTED E&M SERVICE: CPT | Mod: S$GLB,,, | Performed by: PHYSICAL MEDICINE & REHABILITATION

## 2024-05-10 PROCEDURE — 95912 NRV CNDJ TEST 11-12 STUDIES: CPT | Mod: S$GLB,,, | Performed by: PHYSICAL MEDICINE & REHABILITATION

## 2024-05-10 NOTE — PROGRESS NOTES
[facility]   [address]  [place] [address]  Phone: [phone]        Full Name: Candida Barker YOB: 1995  Patient ID: [id number]      Visit Date: 5/10/2024 11:02  Age: 29 Years 0 Months Old  Examining Physician: Lissett Solorio M.D.  Referring Physician:   Reason for Referral: upper ext      Chief Complaint   Patient presents with    Hand Pain       HPI: This is a 29 y.o.  female being seen in clinic today for evaluation of chronic left hand/finger numbness/tingling that bothers her at rest or with activity.  Shaking, rubbing, bracing only provides some relief.  She denies major weakness.    History obtained from patient    Past family, medical, social, and surgical history reviewed in chart    Review of Systems:     General- denies lethargy, weight change, fever, chills  Head/neck- denies swallowing difficulties  ENT- denies hearing changes  Cardiovascular-denies chest pain  Pulmonary- denies shortness of breath  GI- denies constipation or bowel incontinence  - denies bladder incontinence  Skin- denies wounds or rashes  Musculoskeletal- denies weakness, + pain  Neurologic- + numbness and tingling  Psychiatric- denies depressive or psychotic features, denies anxiety  Lymphatic-denies swelling  Endocrine- denies hypoglycemic symptoms/+DM history  All other pertinent systems negative     Physical Examination:  General: Well developed, well nourished female, NAD  HEENT:NCAT EOMI bilaterally   Pulmonary:Normal respirations    Spinal Examination: CERVICAL  Active ROM is within normal limits.  Inspection: No deformity of spinal alignment.    Musculoskeletal Tests:  Phalen: neg on right  Elbow compression (ulnar): neg  Tinels at wrist: +on left    Bilateral Upper and Lower Extremities:  Pulses are 2+ at radial bilaterally.  Shoulder/Elbow/Wrist/Hand ROM wnl  Hip/Knee/Ankle ROM   Bilateral Extremities show normal capillary refill.  No signs of cyanosis, rubor, edema, skin  changes, or dysvascular changes of appendages.  Nails appear intact.    Neurological Exam:  Cranial Nerves:  II-XII grossly intact    Manual Muscle Testing: (Motor 5=normal)  5/5 strength bilateral upper extremities    No focal atrophy is noted of either upper  extremity.    Bilateral Reflexes:  Leung's response is absent bilaterally.    Sensation: tested to light touch  - intact in arms except dec at left 1st-lateral 4th digit    Gait: Narrow base and good arm swing.      Entire procedure explained to patient prior to proceeding.  Verbal consent obtained    SNC      Nerve / Sites Rec. Site Onset Lat Peak Lat Amp Segments Distance Velocity     ms ms µV  mm m/s   L Median - Digit II (Antidromic)      Wrist Dig II 3.3 4.7 10.0 Wrist - Dig  42   R Median - Digit II (Antidromic)      Wrist Dig II 3.1 3.8 22.1 Wrist - Dig  45   L Ulnar - Digit V (Antidromic)      Wrist Dig V 2.3 3.2 13.5 Wrist - Dig V 140 60   R Ulnar - Digit V (Antidromic)      Wrist Dig V 2.3 2.9 24.5 Wrist - Dig V 140 60   L Radial - Anatomical snuff box (Forearm)      Forearm Wrist 1.7 2.2 26.8 Forearm - Wrist 100 58   R Radial - Anatomical snuff box (Forearm)      Forearm Wrist 1.5 2.2 22.0 Forearm - Wrist 100 66       CSI      Nerve / Sites Rec. Site Peak Lat NP Amp Segments Peak Diff     ms µV  ms   R Median - CSI      Median Thumb 3.1 8.9 Median - Radial 0.7      Radial Thumb 2.4 10.4 Median - Ulnar 1.0      Median Ring 4.0 20.0 Median palm - Ulnar palm       Ulnar Ring 3.0 25.7        CSI    CSI 1.7       MNC      Nerve / Sites Muscle Latency Amplitude Duration Rel Amp Segments Distance Lat Diff Velocity     ms mV ms %  mm ms m/s   L Median - APB      Wrist APB 4.0 15.2 6.5 100 Wrist - APB 80        Elbow APB 8.5 12.0 6.4 79 Elbow - Wrist 230 4.5 51   R Median - APB      Wrist APB 3.2 11.1 6.5 100 Wrist - APB 80        Elbow APB 7.7 10.2 6.6 92.3 Elbow - Wrist 260 4.5 58   L Ulnar - ADM      Wrist ADM 2.4 8.0 6.9 100 Wrist - ADM  800        B. Elbow ADM 6.3 8.0 6.5 100 B. Elbow - Wrist 245 3.8 64      A. Elbow ADM 7.8 7.1 6.3 88.5 A. Elbow - B. Elbow 95 1.5 63   R Ulnar - ADM      Wrist ADM 2.3 8.2 6.3 100 Wrist -         B. Elbow ADM 6.3 7.4 6.5 89.5 B. Elbow - Wrist 240 4.0 61         INTERPRETATION  -Bilateral median motor nerve conduction study showed normal latency, amplitude, and conduction velocity  -Bilateral median sensory nerve conduction study showed prolonged peak latency on the left and normal amplitude  -Bilateral ulnar motor nerve conduction study showed normal latency, amplitude, and conduction velocity  -Bilateral ulnar sensory nerve conduction study showed normal peak latency and amplitude  -Bilateral radial sensory nerve conduction study showed normal peak latency and amplitude  -Right combined sensory index was significant    IMPRESSION  ABNORMAL Study  2. There is electrodiagnostic evidence of a mild demyelinating median neuropathy (Carpal tunnel syndrome) across bilateral wrist-worse on the left    PLAN  Discussed in detail for greater than 30 minutes about diagnosis and treatment plan    1. Follow up with referring provider: Dr. Kaylee Yeager  2. Handouts on CTS, bracing recommended. Referral to orthopedics placed.  3. This study is good for one year. If symptoms worsen or do not improve, please re-consult.    Lissett Solorio M.D.  Physical Medicine and Rehab

## 2024-05-15 ENCOUNTER — PATIENT MESSAGE (OUTPATIENT)
Dept: ADMINISTRATIVE | Facility: OTHER | Age: 29
End: 2024-05-15
Payer: COMMERCIAL

## 2024-05-15 ENCOUNTER — PATIENT MESSAGE (OUTPATIENT)
Dept: ADMINISTRATIVE | Facility: HOSPITAL | Age: 29
End: 2024-05-15
Payer: COMMERCIAL

## 2024-05-16 DIAGNOSIS — M25.532 BILATERAL WRIST PAIN: Primary | ICD-10-CM

## 2024-05-16 DIAGNOSIS — M25.531 BILATERAL WRIST PAIN: Primary | ICD-10-CM

## 2024-05-17 ENCOUNTER — HOSPITAL ENCOUNTER (OUTPATIENT)
Dept: RADIOLOGY | Facility: HOSPITAL | Age: 29
Discharge: HOME | End: 2024-05-17
Attending: STUDENT IN AN ORGANIZED HEALTH CARE EDUCATION/TRAINING PROGRAM
Payer: COMMERCIAL

## 2024-05-17 ENCOUNTER — OFFICE VISIT (OUTPATIENT)
Dept: ORTHOPEDICS | Facility: CLINIC | Age: 29
End: 2024-05-17
Payer: COMMERCIAL

## 2024-05-17 ENCOUNTER — TELEPHONE (OUTPATIENT)
Dept: FAMILY MEDICINE | Facility: CLINIC | Age: 29
End: 2024-05-17
Payer: COMMERCIAL

## 2024-05-17 VITALS — HEIGHT: 65 IN | BODY MASS INDEX: 47.98 KG/M2 | WEIGHT: 288 LBS

## 2024-05-17 DIAGNOSIS — M25.532 BILATERAL WRIST PAIN: ICD-10-CM

## 2024-05-17 DIAGNOSIS — M25.531 BILATERAL WRIST PAIN: ICD-10-CM

## 2024-05-17 DIAGNOSIS — H66.91 ACUTE RIGHT OTITIS MEDIA: Primary | ICD-10-CM

## 2024-05-17 DIAGNOSIS — G56.03 BILATERAL CARPAL TUNNEL SYNDROME: Primary | ICD-10-CM

## 2024-05-17 PROCEDURE — 20526 THER INJECTION CARP TUNNEL: CPT | Mod: LT,S$GLB,, | Performed by: STUDENT IN AN ORGANIZED HEALTH CARE EDUCATION/TRAINING PROGRAM

## 2024-05-17 PROCEDURE — 99999 PR PBB SHADOW E&M-EST. PATIENT-LVL V: CPT | Mod: PBBFAC,,, | Performed by: STUDENT IN AN ORGANIZED HEALTH CARE EDUCATION/TRAINING PROGRAM

## 2024-05-17 PROCEDURE — 99204 OFFICE O/P NEW MOD 45 MIN: CPT | Mod: 25,S$GLB,, | Performed by: STUDENT IN AN ORGANIZED HEALTH CARE EDUCATION/TRAINING PROGRAM

## 2024-05-17 PROCEDURE — 3061F NEG MICROALBUMINURIA REV: CPT | Mod: CPTII,S$GLB,, | Performed by: STUDENT IN AN ORGANIZED HEALTH CARE EDUCATION/TRAINING PROGRAM

## 2024-05-17 PROCEDURE — 73110 X-RAY EXAM OF WRIST: CPT | Mod: 26,50,, | Performed by: RADIOLOGY

## 2024-05-17 PROCEDURE — 1160F RVW MEDS BY RX/DR IN RCRD: CPT | Mod: CPTII,S$GLB,, | Performed by: STUDENT IN AN ORGANIZED HEALTH CARE EDUCATION/TRAINING PROGRAM

## 2024-05-17 PROCEDURE — 76942 ECHO GUIDE FOR BIOPSY: CPT | Mod: S$GLB,,, | Performed by: STUDENT IN AN ORGANIZED HEALTH CARE EDUCATION/TRAINING PROGRAM

## 2024-05-17 PROCEDURE — 73110 X-RAY EXAM OF WRIST: CPT | Mod: TC,50,PO

## 2024-05-17 PROCEDURE — 3008F BODY MASS INDEX DOCD: CPT | Mod: CPTII,S$GLB,, | Performed by: STUDENT IN AN ORGANIZED HEALTH CARE EDUCATION/TRAINING PROGRAM

## 2024-05-17 PROCEDURE — 3046F HEMOGLOBIN A1C LEVEL >9.0%: CPT | Mod: CPTII,S$GLB,, | Performed by: STUDENT IN AN ORGANIZED HEALTH CARE EDUCATION/TRAINING PROGRAM

## 2024-05-17 PROCEDURE — 1159F MED LIST DOCD IN RCRD: CPT | Mod: CPTII,S$GLB,, | Performed by: STUDENT IN AN ORGANIZED HEALTH CARE EDUCATION/TRAINING PROGRAM

## 2024-05-17 PROCEDURE — 4010F ACE/ARB THERAPY RXD/TAKEN: CPT | Mod: CPTII,S$GLB,, | Performed by: STUDENT IN AN ORGANIZED HEALTH CARE EDUCATION/TRAINING PROGRAM

## 2024-05-17 PROCEDURE — 3066F NEPHROPATHY DOC TX: CPT | Mod: CPTII,S$GLB,, | Performed by: STUDENT IN AN ORGANIZED HEALTH CARE EDUCATION/TRAINING PROGRAM

## 2024-05-17 RX ORDER — BETAMETHASONE SODIUM PHOSPHATE AND BETAMETHASONE ACETATE 3; 3 MG/ML; MG/ML
6 INJECTION, SUSPENSION INTRA-ARTICULAR; INTRALESIONAL; INTRAMUSCULAR; SOFT TISSUE
Status: DISCONTINUED | OUTPATIENT
Start: 2024-05-17 | End: 2024-05-17 | Stop reason: HOSPADM

## 2024-05-17 RX ORDER — CIPROFLOXACIN AND DEXAMETHASONE 3; 1 MG/ML; MG/ML
4 SUSPENSION/ DROPS AURICULAR (OTIC) 2 TIMES DAILY
Qty: 7.5 ML | Refills: 0 | Status: SHIPPED | OUTPATIENT
Start: 2024-05-17 | End: 2024-05-27

## 2024-05-17 RX ORDER — AMOXICILLIN AND CLAVULANATE POTASSIUM 875; 125 MG/1; MG/1
1 TABLET, FILM COATED ORAL EVERY 12 HOURS
Qty: 20 TABLET | Refills: 0 | Status: SHIPPED | OUTPATIENT
Start: 2024-05-17 | End: 2024-05-27

## 2024-05-17 RX ADMIN — BETAMETHASONE SODIUM PHOSPHATE AND BETAMETHASONE ACETATE 6 MG: 3; 3 INJECTION, SUSPENSION INTRA-ARTICULAR; INTRALESIONAL; INTRAMUSCULAR; SOFT TISSUE at 11:05

## 2024-05-17 NOTE — PROCEDURES
Sports Medicine US - Guidance for Needle Placement    Date/Time: 5/17/2024 11:40 AM    Performed by: Livan Mendez MD  Authorized by: Livan Mendez MD  Preparation: Patient was prepped and draped in the usual sterile fashion.  Local anesthesia used: no    Anesthesia:  Local anesthesia used: no    Sedation:  Patient sedated: no    Patient tolerance: patient tolerated the procedure well with no immediate complications  Comments: Ultrasound guidance was used for needle localization. Images were saved and stored for documentation. The appropriate structures were visualized. Dynamic visualization of the needle was continuous throughout the procedures and maintained good position.

## 2024-05-17 NOTE — TELEPHONE ENCOUNTER
I called and spoke to  ralph in the lab about the T-Spot results. She said 7-10 working working days. I called the patient but there was no answer.  I left a message on voice mail letting her know the lab hayes it takes 7-10 working days to get T-Spot results.

## 2024-05-17 NOTE — PROCEDURES
Carpal Tunnel    Date/Time: 5/17/2024 11:40 AM    Performed by: Livan Mendez MD  Authorized by: Livan Mendez MD    Consent Done?:  Yes (Verbal)  Indications:  Pain  Site marked: the procedure site was marked    Timeout: prior to procedure the correct patient, procedure, and site was verified    Prep: patient was prepped and draped in usual sterile fashion      Local anesthetic:  Lidocaine 1% without epinephrine  Location:  Wrist  Site:  L carpal tunnel  Ultrasonic Guidance for Needle Placement?: Yes    Needle size:  25 G  Approach:  Volar  Medications:  6 mg betamethasone acetate-betamethasone sodium phosphate 6 mg/mL  Patient tolerance:  Patient tolerated the procedure well with no immediate complications     Additional Comments: Ultrasound guidance was used for needle localization. Images were saved and stored for documentation. The appropriate structures were visualized. Dynamic visualization of the needle was continuous throughout the procedures and maintained good position.

## 2024-05-17 NOTE — PATIENT INSTRUCTIONS
Assessment:  Candida Barker is a 29 y.o. female   Chief Complaint   Patient presents with    Left Wrist - Pain, Numbness    Right Wrist - Pain, Numbness       Encounter Diagnosis   Name Primary?    Bilateral carpal tunnel syndrome         Plan:  Ultrasound guided cortisone injection to the left carpal tunnel  We discussed the proper protocols after the injection such as no submerging pools, baths tubs, or hot tubs for 24 hr.  Showering is okay today.  We also discussed that blood sugars can be elevated after an injection and asked patient to properly checked her sugars over the next few days and contact their PCP if there are any concerns.  We discussed red flags such as fevers, chills, red, warm, tender joint at the area of injection to please seek medical care immediately.    Follow up as needed    Follow-up: as needed.    Thank you for choosing [facility] and Dr. Livan Mendez for your orthopedic & sports medicine care. It is our goal to provide you with exceptional care that will help keep you healthy, active, and get you back in the game.    Please do not hesitate to reach out to us via email, phone, or MyChart with any questions, concerns, or feedback.    If you felt that you received exemplary care today, please consider leaving us feedback on [facility] at:  [email or web address]    If you are experiencing pain/discomfort ,or have questions after 5pm and would like to be connected to the [facility] on-call team, please call this number and specify which Sports Medicine provider is treating you: [phone]

## 2024-05-17 NOTE — PROGRESS NOTES
Patient ID: Candida Barker  YOB: 1995  MRN: [id number]    Chief Complaint: Pain and Numbness of the Left Wrist and Pain and Numbness of the Right Wrist      Referred By: Lissett Solorio MD for Bilateral Carpal Tunnel    History of Present Illness: Candida Barker is a right-hand dominant 29 y.o. female who presents today with bilateral carpal tunnel, left greater than the right  Reports that she has been having issues for the last couple of months.  Received an EMG from Dr. Lissett Solorio on 5/10/2024.  Rates pain today at a 3/10 and describes as tingling, stabbing pain.  Reports burning and numbness at night in both hands.  Patient states that she currently does use braces as needed to help with symptom relief and has been taking Gabapentin but does not see a change with the medication.     The patient is active in none.  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 No pertinent past medical history.  Past Surgical History:   Procedure Laterality Date    CHOLECYSTECTOMY  12/2023     Family History   Problem Relation Name Age of Onset    Diabetes Mother Beata     Early death Mother Beata     Hypertension Mother Beata     Miscarriages / Stillbirths Mother Beata     Drug abuse Father Israel     Hypertension Father Israel     Diabetes Maternal Grandfather [place]     Cancer Maternal Grandmother Keysha     Vision loss Maternal Grandmother Keysha     Asthma Maternal Aunt Francisca     Hypertension Maternal Aunt Francisca     Stroke Maternal Aunt Francisca     Diabetes Maternal Aunt Kely      Social History     Socioeconomic History    Marital status:    Tobacco Use    Smoking status: Never    Smokeless tobacco: Never   Substance and Sexual Activity    Alcohol use: Yes     Alcohol/week: 1.0 standard drink of alcohol     Types: 1 Glasses of wine per week    Drug use: Never    Sexual activity: Yes     Partners: Male     Birth control/protection: None     Social  Determinants of Health     Financial Resource Strain: Low Risk  (3/18/2024)    Overall Financial Resource Strain (CARDIA)     Difficulty of Paying Living Expenses: Not very hard   Food Insecurity: No Food Insecurity (3/18/2024)    Hunger Vital Sign     Worried About Running Out of Food in the Last Year: Never true     Ran Out of Food in the Last Year: Never true   Transportation Needs: No Transportation Needs (3/18/2024)    PRAPARE - Transportation     Lack of Transportation (Medical): No     Lack of Transportation (Non-Medical): No   Physical Activity: Sufficiently Active (3/18/2024)    Exercise Vital Sign     Days of Exercise per Week: 4 days     Minutes of Exercise per Session: 40 min   Stress: Stress Concern Present (3/18/2024)    [employer] [place] [employer] - Occupational Stress Questionnaire     Feeling of Stress : To some extent   Housing Stability: Low Risk  (3/18/2024)    Housing Stability Vital Sign     Unable to Pay for Housing in the Last Year: No     Number of Places Lived in the Last Year: 1     Unstable Housing in the Last Year: No     Medication List with Changes/Refills   Current Medications    ALBUTEROL (PROVENTIL/VENTOLIN HFA) 90 MCG/ACTUATION INHALER    Inhale 2 puffs into the lungs every 6 (six) hours as needed for Wheezing.    ALPRAZOLAM (XANAX) 0.5 MG TABLET    Take 1 tablet (0.5 mg total) by mouth nightly as needed for Anxiety.    APREMILAST (OTEZLA STARTER) 10 MG (4)-20 MG (4)-30 MG (47) DSPK    Initial: 10 mg in the morning on day 1. Day 2: 10 mg twice daily. Day 3: 10 mg in the morning and 20 mg in the evening. Day 4: 20 mg twice daily. Day 5: 20 mg in the morning and 30 mg in the evening. Maintenance dose: 30 mg twice daily starting on day 6.    APREMILAST (OTEZLA) 30 MG TAB    Take 1 tablet (30 mg total) by mouth 2 (two) times daily.    ASPIRIN (ECOTRIN) 81 MG EC TABLET        BLOOD-GLUCOSE METER,CONTINUOUS (DEXCOM ) MISC    Use as directed    BLOOD-GLUCOSE SENSOR  "(DEXCOM G6 SENSOR) GERALDO    Apply and change every 10 days    BLOOD-GLUCOSE TRANSMITTER (DEXCOM G6 TRANSMITTER) GERALDO    Use as directed every 90 days    CARVEDILOL (COREG) 12.5 MG TABLET    Take 1 tablet (12.5 mg total) by mouth 2 (two) times daily.    ENALAPRIL (VASOTEC) 20 MG TABLET    Take 1 tablet (20 mg total) by mouth 2 (two) times daily.    FLUOXETINE 40 MG CAPSULE    Take 1 capsule (40 mg total) by mouth once daily.    GABAPENTIN (NEURONTIN) 300 MG CAPSULE    Take 1 capsule (300 mg total) by mouth every evening.    HYDROCHLOROTHIAZIDE (HYDRODIURIL) 12.5 MG TAB    Take 1 tablet (12.5 mg total) by mouth every morning.    HYDROXYZINE PAMOATE (VISTARIL) 25 MG CAP    Take 1-2 capsules (25-50 mg total) by mouth daily as needed (insomnia).    INSULIN LISPRO (HUMALOG U-100 INSULIN) 100 UNIT/ML CRTG    Inject 50 Units into the skin 3 (three) times daily. 10 breakfast, 15 lunch, 25 dinner    LACTOBACILLUS RHAMNOSUS GG (CULTURELLE) 10 BILLION CELL CAPSULE    Take 1 capsule by mouth once daily.    LEFLUNOMIDE (ARAVA) 20 MG TAB    Take 20 mg by mouth once daily.    LEVOCETIRIZINE (XYZAL) 5 MG TABLET    Take 5 mg by mouth every evening.    LEVOTHYROXINE (SYNTHROID) 200 MCG TABLET    Take 1 tablet (200 mcg total) by mouth before breakfast.    LEVOTHYROXINE (SYNTHROID) 50 MCG TABLET    Take 1 tablet (50 mcg total) by mouth before breakfast.    METFORMIN (GLUCOPHAGE-XR) 500 MG ER 24HR TABLET    Take 1,000 mg by mouth 2 (two) times daily with meals.    MOUNJARO 7.5 MG/0.5 ML PNIJ    Inject 7.5 mg into the skin every 7 days.    ONDANSETRON (ZOFRAN-ODT) 4 MG TBDL    Take 1 tablet (4 mg total) by mouth every 8 (eight) hours as needed (nausea/vomiting).    PEN NEEDLE, DIABETIC 32 GAUGE X 5/32" NDLE    4 injections daily    ROSUVASTATIN (CRESTOR) 5 MG TABLET    Take 1 tablet (5 mg total) by mouth once daily.    TOUJEO MAX U-300 SOLOSTAR 300 UNIT/ML (3 ML) INSULIN PEN    Inject 70 Units into the skin once daily.     Review of " patient's allergies indicates:  No Known Allergies    Physical Exam:   Body mass index is 47.93 kg/m².    GENERAL: Well appearing, in no acute distress.  HEAD: Normocephalic and atraumatic.  ENT: External ears and nose grossly normal.  EYES: EOMI bilaterally  PULMONARY: Respirations are grossly even and non-labored.  NEURO: Awake, alert, and oriented x 3.  SKIN: No obvious rashes appreciated.  PSYCH: Mood & affect are appropriate.    Detailed MSK exam:     Left hand/wrist exam:   -TTP: none  -Swelling/ecchymosis: none  -Full ROM  - strength 5/5  -Sensation intact  -Pulses 2+  -Rotational deformity: negative  -Tinel sign: positive  -Phalen sign: positive  -Finkelstein sign: negative      Imaging:  X-Ray Wrist Complete Bilateral  Narrative: EXAMINATION:  XR WRIST COMPLETE 3 VIEWS BILATERAL    CLINICAL HISTORY:  Pain in right wrist    TECHNIQUE:  AP, lateral, and oblique views of both wrists were performed.    COMPARISON:  None    FINDINGS:  Right:    There is no radiographic evidence of acute osseous, articular, or soft tissue abnormality. Joint spaces are well preserved. Carpal alignment is within normal limits.  No erosive osseous changes demonstrated.    Left:    There is no radiographic evidence of acute osseous, articular, or soft tissue abnormality. Joint spaces are well preserved. Carpal alignment is within normal limits.  No erosive osseous changes demonstrated.  Impression: No acute findings    Electronically signed by: Curt Carballo MD  Date:    05/17/2024  Time:    11:49        Relevant imaging results were reviewed and interpreted by me and per my read shows no acute abnormalities.  This was discussed with the patient and / or family today.     Assessment:  Candida Barker is a 29 y.o. female presenting with left hand numbness/tingling.   History, physical and radiographs are consistent with a likely diagnosis of left CTS.   Plan: Steroid injection given today (see separate procedure note for details).  We discussed the proper protocols after the injection such as no submerging pools, baths tubs, or hot tubs for 24 hr.  Showering is okay today.  We also discussed that blood sugars can be elevated after an injection and asked patient to properly checked her sugars over the next few days and contact their PCP if there are any concerns.  We discussed red flags such as fevers, chills, red, warm, tender joint at the area of injection to please seek medical care immediately.   Continue wrist brace at night. Continue conservative management for pain.   Follow up as needed. All questions answered.      Bilateral carpal tunnel syndrome  Comments:  mild demyelinating -worse on left  Orders:  -     Ambulatory referral/consult to Orthopedics  -     Sports Medicine US - Guidance for Needle Placement  -     Carpal Tunnel         Ultrasound guidance was used for needle localization. Images were saved and stored for documentation. The appropriate structures were visualized. Dynamic visualization of the needle was continuous throughout the procedures and maintained good position.      A copy of today's visit note has been sent to the referring provider.     Electronically signed:  Livan Mendez MD, MPH  05/17/2024  11:56 AM    Answers submitted by the patient for this visit:  Orthopedics Questionnaire (Submitted on 5/15/2024)  unexpected weight change: No  appetite change : No  sleep disturbance: No  IMMUNOCOMPROMISED: No  nervous/ anxious: No  dysphoric mood: No  rash: No  visual disturbance: No  eye redness: No  eye pain: No  ear pain: No  tinnitus: No  hearing loss: No  sinus pressure : No  nosebleeds: No  enviro allergies: No  food allergies: No  cough: No  shortness of breath: No  sweating: No  dysuria: No  frequency: No  difficulty urinating: No  hematuria: No  painful intercourse: No  chest pain: No  palpitations: No  nausea: No  vomiting: No  diarrhea: No  blood in stool: No  constipation: No  headaches: No  dizziness:  No  numbness: No  seizures: No  joint swelling: No  myalgia: No  weakness: No  back pain: No   (Submitted on 5/15/2024)  Chief Complaint: Hand injury  Pain Chronicity: new  History of trauma: No  Onset: more than 1 month ago  Frequency: constantly  Progression since onset: gradually worsening  injury location: at home  pain- numeric: 4/10  pain location: left hand, right hand  pain quality: burning, tingling, numb  Radiating Pain: No  Aggravating factors: activity, lifting, touching  fever: No  inability to bear weight: No  itching: No  joint locking: No  limited range of motion: No  stiffness: No  tingling: Yes  Treatments tried: brace/corset, rest  physical therapy: not tried  Improvement on treatment: no relief

## 2024-05-27 LAB — M TB IFN-G BLD-IMP: NEGATIVE

## 2024-05-29 ENCOUNTER — TELEPHONE (OUTPATIENT)
Dept: FAMILY MEDICINE | Facility: CLINIC | Age: 29
End: 2024-05-29
Payer: COMMERCIAL

## 2024-05-29 DIAGNOSIS — B37.31 VULVOVAGINAL CANDIDIASIS: Primary | ICD-10-CM

## 2024-05-29 RX ORDER — FLUCONAZOLE 150 MG/1
150 TABLET ORAL
Qty: 2 TABLET | Refills: 0 | Status: SHIPPED | OUTPATIENT
Start: 2024-05-29 | End: 2024-06-02

## 2024-05-30 ENCOUNTER — CLINICAL SUPPORT (OUTPATIENT)
Dept: FAMILY MEDICINE | Facility: CLINIC | Age: 29
End: 2024-05-30
Payer: COMMERCIAL

## 2024-05-30 ENCOUNTER — TELEPHONE (OUTPATIENT)
Dept: FAMILY MEDICINE | Facility: CLINIC | Age: 29
End: 2024-05-30
Payer: COMMERCIAL

## 2024-05-30 DIAGNOSIS — H66.91 ACUTE RIGHT OTITIS MEDIA: Primary | ICD-10-CM

## 2024-05-30 DIAGNOSIS — H69.91 ETD (EUSTACHIAN TUBE DYSFUNCTION), RIGHT: ICD-10-CM

## 2024-05-30 PROCEDURE — 96372 THER/PROPH/DIAG INJ SC/IM: CPT | Mod: S$GLB,,, | Performed by: PHYSICIAN ASSISTANT

## 2024-05-30 PROCEDURE — 99999 PR PBB SHADOW E&M-EST. PATIENT-LVL III: CPT | Mod: PBBFAC,,,

## 2024-05-30 RX ORDER — CETIRIZINE HYDROCHLORIDE, PSEUDOEPHEDRINE HYDROCHLORIDE 5; 120 MG/1; MG/1
1 TABLET, FILM COATED, EXTENDED RELEASE ORAL 2 TIMES DAILY
Qty: 20 TABLET | Refills: 0 | Status: SHIPPED | OUTPATIENT
Start: 2024-05-30 | End: 2024-06-09

## 2024-05-30 RX ORDER — CEFTRIAXONE 1 G/1
1 INJECTION, POWDER, FOR SOLUTION INTRAMUSCULAR; INTRAVENOUS
Status: COMPLETED | OUTPATIENT
Start: 2024-05-30 | End: 2024-05-30

## 2024-05-30 RX ORDER — CETIRIZINE HYDROCHLORIDE, PSEUDOEPHEDRINE HYDROCHLORIDE 5; 120 MG/1; MG/1
1 TABLET, FILM COATED, EXTENDED RELEASE ORAL 2 TIMES DAILY
Qty: 14 TABLET | Refills: 0 | Status: SHIPPED | OUTPATIENT
Start: 2024-05-30 | End: 2024-05-30

## 2024-05-30 RX ORDER — FLUTICASONE PROPIONATE 50 MCG
SPRAY, SUSPENSION (ML) NASAL
Qty: 16 G | Refills: 0 | Status: SHIPPED | OUTPATIENT
Start: 2024-05-30

## 2024-05-30 RX ORDER — CEFDINIR 300 MG/1
300 CAPSULE ORAL 2 TIMES DAILY
Qty: 14 CAPSULE | Refills: 0 | Status: SHIPPED | OUTPATIENT
Start: 2024-05-30 | End: 2024-05-30

## 2024-05-30 RX ORDER — CEFDINIR 300 MG/1
300 CAPSULE ORAL 2 TIMES DAILY
Qty: 20 CAPSULE | Refills: 0 | Status: SHIPPED | OUTPATIENT
Start: 2024-05-30 | End: 2024-06-09

## 2024-05-30 RX ADMIN — CEFTRIAXONE 1 G: 1 INJECTION, POWDER, FOR SOLUTION INTRAMUSCULAR; INTRAVENOUS at 04:05

## 2024-05-30 NOTE — TELEPHONE ENCOUNTER
I have signed for the following orders AND/OR meds.  Please call the patient and ask the patient to schedule the testing AND/OR inform about any medications that were sent. Medications have been sent to pharmacy listed below      No orders of the defined types were placed in this encounter.      Medications Ordered This Encounter   Medications    cefdinir (OMNICEF) 300 MG capsule     Sig: Take 1 capsule (300 mg total) by mouth 2 (two) times daily. for 10 days     Dispense:  20 capsule     Refill:  0    cefTRIAXone injection 1 g    cetirizine-pseudoephedrine 5-120 mg Tb12     Sig: Take 1 tablet by mouth 2 (two) times daily. for 10 days     Dispense:  20 tablet     Refill:  0    fluticasone propionate (FLONASE) 50 mcg/actuation nasal spray     Sig: Use 2 sprays to each nostril daily     Dispense:  16 g     Refill:  0         [facility]  [address] [facility] [address]  [place] [address]  Phone: [phone] Fax: [phone]    [facility]/pharmacy #[id number] - [place] - [address]  [place] [address]  Phone: [phone] Fax: [phone]

## 2024-06-03 ENCOUNTER — PATIENT MESSAGE (OUTPATIENT)
Dept: FAMILY MEDICINE | Facility: CLINIC | Age: 29
End: 2024-06-03
Payer: COMMERCIAL

## 2024-06-03 NOTE — LETTER
Zarina 3, 2024      [facility]  [address] [facility] [place] [address]  Phone: [phone]  Fax: [phone]       Patient: Candida Barker   YOB: 1995  Date of Visit: 06/03/2024    To Whom It May Concern:   Candida Barker is currently a patient and under my care. Due to fluctuating blood glucose,  please allow Dakota Barker to be able to monitor her blood glucose using her phone that is linked to her Dexcom. If you have any questions or concerns, or if I can be of further assistance, please do not hesitate to contact me.    Sincerely,    Dr. Kaylee Yeager MD

## 2024-06-10 ENCOUNTER — TELEPHONE (OUTPATIENT)
Dept: FAMILY MEDICINE | Facility: CLINIC | Age: 29
End: 2024-06-10
Payer: COMMERCIAL

## 2024-06-10 DIAGNOSIS — H69.91 ETD (EUSTACHIAN TUBE DYSFUNCTION), RIGHT: ICD-10-CM

## 2024-06-10 DIAGNOSIS — H92.01 RIGHT EAR PAIN: Primary | ICD-10-CM

## 2024-06-10 NOTE — TELEPHONE ENCOUNTER
I have signed for the following orders AND/OR meds.  Please call the patient and ask the patient to schedule the testing AND/OR inform about any medications that were sent. Medications have been sent to pharmacy listed below      Orders Placed This Encounter   Procedures    Ambulatory referral/consult to ENT     Standing Status:   Future     Standing Expiration Date:   7/10/2025     Referral Priority:   Routine     Referral Type:   Consultation     Referral Reason:   Specialty Services Required     Requested Specialty:   Otolaryngology     Number of Visits Requested:   1              [facility]  [address] [facility] [address]  [place] [address]  Phone: [phone] Fax: [phone]    [facility]/pharmacy #[id number] - [place] - [address]  [place] [address]  Phone: [phone] Fax: [phone]

## 2024-06-19 ENCOUNTER — OFFICE VISIT (OUTPATIENT)
Dept: OTOLARYNGOLOGY | Facility: CLINIC | Age: 29
End: 2024-06-19
Payer: COMMERCIAL

## 2024-06-19 ENCOUNTER — CLINICAL SUPPORT (OUTPATIENT)
Dept: AUDIOLOGY | Facility: CLINIC | Age: 29
End: 2024-06-19
Payer: COMMERCIAL

## 2024-06-19 ENCOUNTER — PATIENT MESSAGE (OUTPATIENT)
Dept: OTHER | Facility: OTHER | Age: 29
End: 2024-06-19
Payer: COMMERCIAL

## 2024-06-19 VITALS — WEIGHT: 287.94 LBS | HEIGHT: 65 IN | BODY MASS INDEX: 47.97 KG/M2

## 2024-06-19 DIAGNOSIS — H92.01 REFERRED OTALGIA OF RIGHT EAR: Primary | ICD-10-CM

## 2024-06-19 DIAGNOSIS — H93.8X9 SENSATION OF FULLNESS IN EAR, UNSPECIFIED LATERALITY: Primary | ICD-10-CM

## 2024-06-19 DIAGNOSIS — G47.33 OSA ON CPAP: ICD-10-CM

## 2024-06-19 DIAGNOSIS — J30.9 ALLERGIC RHINITIS, UNSPECIFIED SEASONALITY, UNSPECIFIED TRIGGER: ICD-10-CM

## 2024-06-19 PROCEDURE — 99203 OFFICE O/P NEW LOW 30 MIN: CPT | Mod: 25,S$GLB,, | Performed by: NURSE PRACTITIONER

## 2024-06-19 PROCEDURE — 3008F BODY MASS INDEX DOCD: CPT | Mod: CPTII,S$GLB,, | Performed by: NURSE PRACTITIONER

## 2024-06-19 PROCEDURE — 1159F MED LIST DOCD IN RCRD: CPT | Mod: CPTII,S$GLB,, | Performed by: NURSE PRACTITIONER

## 2024-06-19 PROCEDURE — 4010F ACE/ARB THERAPY RXD/TAKEN: CPT | Mod: CPTII,S$GLB,, | Performed by: NURSE PRACTITIONER

## 2024-06-19 PROCEDURE — 1160F RVW MEDS BY RX/DR IN RCRD: CPT | Mod: CPTII,S$GLB,, | Performed by: NURSE PRACTITIONER

## 2024-06-19 PROCEDURE — 31575 DIAGNOSTIC LARYNGOSCOPY: CPT | Mod: S$GLB,,, | Performed by: NURSE PRACTITIONER

## 2024-06-19 PROCEDURE — 99999 PR PBB SHADOW E&M-EST. PATIENT-LVL V: CPT | Mod: PBBFAC,,, | Performed by: NURSE PRACTITIONER

## 2024-06-19 PROCEDURE — 3061F NEG MICROALBUMINURIA REV: CPT | Mod: CPTII,S$GLB,, | Performed by: NURSE PRACTITIONER

## 2024-06-19 PROCEDURE — 3066F NEPHROPATHY DOC TX: CPT | Mod: CPTII,S$GLB,, | Performed by: NURSE PRACTITIONER

## 2024-06-19 PROCEDURE — 3046F HEMOGLOBIN A1C LEVEL >9.0%: CPT | Mod: CPTII,S$GLB,, | Performed by: NURSE PRACTITIONER

## 2024-06-19 NOTE — PROGRESS NOTES
Subjective     Patient ID: Candida Barker is a 29 y.o. female.    Chief Complaint: Sore Throat and Otitis Media    HPI  Patient is new to ENT, referred by DAVID Hamilton for consultation for right otalgia. Patient received a penicillin injection here on 05/09/2024 for a positive strep test (not recorded in Epic). Patient then received rx for Augmentin and Ciprodex on 05/17/2024 for right otalgia (no visit note/PE recorded). Patient then received Rocephin injection and Omnicef on 05/30/2024 for right otalgia (no visit note/PE recorded). No strep swabs or throat cultures are available for review in Epic. She tested negative for COVID, flu A&B, RSV, parainfluenza, rhinovirus, adenovirus, etc. 6 months ago. She has twin foster children who are in  and bring home frequent URTIs. She is on CPAP for OSAS, tolerating well.    Review of Systems   Constitutional: Negative.  Negative for fever.   HENT:  Positive for sore throat. Negative for nasal congestion, dental problem, facial swelling, postnasal drip, rhinorrhea, sinus pressure/congestion, sneezing, trouble swallowing and voice change.    Eyes: Negative.  Negative for discharge, redness and itching.   Respiratory:  Positive for cough. Negative for choking.    Cardiovascular: Negative.    Gastrointestinal: Negative.    Musculoskeletal: Negative.    Integumentary:  Negative.   Neurological: Negative.  Negative for headaches.   Hematological: Negative.    Psychiatric/Behavioral: Negative.            Objective     Physical Exam  Vitals and nursing note reviewed.   Constitutional:       General: She is not in acute distress.     Appearance: She is well-developed. She is not ill-appearing or diaphoretic.   HENT:      Head: Normocephalic and atraumatic.      Right Ear: Hearing, tympanic membrane, ear canal and external ear normal. No middle ear effusion. Tympanic membrane is not erythematous. Tympanic membrane has normal mobility.      Left Ear: Hearing, tympanic membrane, ear  "canal and external ear normal.  No middle ear effusion. Tympanic membrane is not erythematous. Tympanic membrane has normal mobility.      Ears:      Comments: Type "A" tympanograms AU consistent with well-pneumatized mesotympanums and absence of middle ear effusions     Nose: Nose normal. No mucosal edema, congestion or rhinorrhea.      Mouth/Throat:      Mouth: Mucous membranes are not pale, not dry and not cyanotic. No oral lesions.      Tongue: No lesions.      Palate: No lesions.      Pharynx: Oropharynx is clear. Uvula midline. No pharyngeal swelling, oropharyngeal exudate or posterior oropharyngeal erythema.      Tonsils: No tonsillar exudate. 2+ on the right. 2+ on the left.   Eyes:      General: Lids are normal. No scleral icterus.        Right eye: No discharge.         Left eye: No discharge.   Neck:      Thyroid: No thyroid mass or thyromegaly.      Trachea: Trachea normal. No tracheal deviation.   Cardiovascular:      Rate and Rhythm: Normal rate.   Pulmonary:      Effort: Pulmonary effort is normal. No respiratory distress.      Breath sounds: Normal air entry.   Musculoskeletal:         General: Normal range of motion.      Cervical back: Normal range of motion and neck supple.   Lymphadenopathy:      Head:      Right side of head: No submental, submandibular, tonsillar, preauricular or posterior auricular adenopathy.      Left side of head: No submental, submandibular, tonsillar, preauricular or posterior auricular adenopathy.      Cervical: No cervical adenopathy.      Right cervical: No superficial or posterior cervical adenopathy.     Left cervical: No superficial or posterior cervical adenopathy.   Skin:     General: Skin is warm and dry.      Coloration: Skin is not pale.      Findings: No lesion or rash.   Neurological:      Mental Status: She is alert and oriented to person, place, and time.      Motor: Motor function is intact.      Coordination: Coordination is intact.      Gait: Gait " normal.   Psychiatric:         Attention and Perception: Attention normal.         Mood and Affect: Mood normal.         Speech: Speech normal.         Behavior: Behavior normal. Behavior is cooperative.     Procedure: Flexible laryngoscopy    In order to fully examine the upper aerodigestive tract, including the larynx, in a patient with a hyperactive gag reflex, and suboptimal visualization with indirect mirror exam,  flexible endoscopy is required.   After explaining the procedure and obtaining verbal consent, a timeout was performed with the patient's participation according to the universal protocol. Both nasal cavities were anesthetized with 4% Xylocaine spray mixed with Eliseo-Synephrine. The flexible laryngoscope  was inserted into the nasal cavity and advanced to visualize the nasal cavity, nasopharynx, the posterior oropharynx, hypopharynx, and the endolarynx with the  findings noted. The scope was removed and the procedure terminated. The patient tolerated this procedure well without apparent complication.     OVERALL FINDINGS  Nasopharynx - the torus is clear. There are no lesions of the posterior wall.   Oropharynx - no lesions of the tongue base. There is no obvious fullness or asymmetry.  Hypopharynx - there are no lesions of the pyriform sinuses or postcricoid region   Larynx - there are no lesions of the supraglottic or glottic larynx.  Vocal fold mobility is normal.     SPECIFIC FINDINGS  Adenoid tissue - normal   Nasopharynx & eustachian tube orifices - normal   Posterior pharyngeal wall - normal   Base of tongue - normal   Epiglottis - normal   Valleculae - normal   Pyriform sinuses - normal   False vocal cords - normal   True vocal cords - normal  Arytenoids - normal   Interarytenoid space - edema  Base of Tongue/Pharynx    Larynx    Larynx         Assessment and Plan     1. Allergic rhinitis, unspecified seasonality, unspecified trigger    2. KEIRA on CPAP  -     Ambulatory referral/consult to  "ENT    3. Referred otalgia of right ear  -     Ambulatory referral/consult to ENT      Reassured no residual middle ear effusion remaining at this time: Type "A" tympanograms AU consistent with well-pneumatized mesotympanums and absence of middle ear effusions. Discussed sources of referred otalgia including musculoskeletal, odontogenic, neuropathic, etc.   Recommend restarting daily allergy regimen on Xyzal, Flonase, saline  Discussed her tonsils are quite small. Discussed criteria for tonsillectomy: recurrent Infection: At least seven episodes in the previous year, OR at least five episodes in each of the previous two years, OR at least three episodes in each of the previous three years.   Discussed typical constellation of symptoms seen with some of the more common differentials for recurrent throat pain may include but not limited to: allergic rhinitis (see allergist or take daily allergy meds), silent reflux (see GI or take daily reflux meds), sinusitis (imaging), tonsillitis/pharyngitis (swab/labs), dryness/dehydration.   Patient encouraged to return to clinic if symptoms worsen/persist and as needed for further ENT symptoms or concerns.            No follow-ups on file.      "

## 2024-06-19 NOTE — PATIENT INSTRUCTIONS
Criteria for tonsillectomy:    Recurrent Infection: At least seven episodes in the previous year, OR at least five episodes in each of the previous two years, OR at least three episodes in each of the previous three years.   OR  Obstructive Sleep Apnea Syndrome confirmed via sleep study        For sore throat, let's talk through some of the top considerations for recurrent throat discomfort one-at-a-time:     1. Nasal allergies -- Typical constellation of symptoms seen with nasal allergies: itchy, red, watery eyes; itchy, red, watery nose; excessive sneezing; excessive stuffiness. If this is the one that best describes your daily states, then consider seeing an allergist next to come up with a more aggressive anti-allergy regimen.       2. Silent reflux -- Typical constellation of symptoms seen with silent reflux: post-nasal drip sensation with absence of significant runny nose or nasal congestion, sensation of thick or too much mucus in the back of throat, raspy voice, frequent throat clearing, sensation of something in the back of throat, frequent throat irritation. If this one best describes your current state, see a gastroenterologist and continue your daily anti-reflux regimen, raise the head of your bed, avoid trigger foods, avoid eating anything 3 hours before going to bed, and shedding a few pounds may help improve reflux.     3. Sinus infection -- Typical constellation of symptoms seen with acute bacterial sinus infection are:  Green-gold, foul-smelling, foul-tasting mucus from nose and throat, inability to breathe through nose, inability to smell or taste well, facial pain and swelling, dental pain, headaches around eyes, sore throat and productive cough. You should let me know if this one best describes your current state, and I will order sinus imaging.     4. Pharyngitis/Tonsillitis -- Typical constellation of symptoms seen with acute bacterial tonsillitis/pharyngitis are:  Smelly pus (exudate), very  "red inflamed throat, swollen lymph nodes, fever, general malaise, absence of cough. If these symptoms are present, you may need a throat swab (any urgent care or walk-in clinic can do this quickly for you).  Criteria for tonsillectomy:  At least seven episodes in the previous year, OR at least five episodes in each of the previous two years, OR at least three episodes in each of the previous three years.     5. Dryness from either dehydration, mouth-breathing, or side effects of medications -- Most people are not drinking enough water regularly to keep up with body's demand. Recommend AT LEAST 64 oz of water per day, and more for men (up to 100 oz.) unless a medical issue prevents this. Reduce intake of caffeine / tea / sugary drinks or soda. Chronic nasal obstruction should be addressed to minimize time spent breathing through the mouth which dries the mouth and throat leading to discomfort.     6. Anxiety -- Some people experience tightness in their throat when they are anxious. This can make someone feel as though they have a lump in their throat or that they are choking. Sometimes, this sensation causes further anxiety, creating a vicious cycle. You can help alleviate this tight throat feeling by relaxing your throat muscles. Gentle throat massage, rolling your neck to release tight muscles, relaxing, relaxing in a warm bath, and light to moderate exercise, for example, can all help eliminate muscle tension symptoms.         The "gold standard" for determining the presence or absence of middle ear fluid is tympanometry.  You have normal, Type "A" tympanograms, which means no fluid behind your ear drums.  Since there is no middle ear fluid, there is no infection.     Other possible causes for continued ear pain can include but are not limited to:   dental pathology or TMJ (jaw joint arthritis) -- see your dentist  cervical spine arthritis (discuss possible imaging/MRI with PCP)  myofascial pain syndrome/headaches " or neuralgias (see your neurologist)  GERD (anti-acid reflux medications twice daily and see your GI)  heck/neck neoplasms (CT neck w/contrast)  Shingles (would break out in a painful, red, blistering rash on one side)

## 2024-06-19 NOTE — PROGRESS NOTES
Tympanometry completed per order from Iirna Hung NP.    Type A tympanogram obtained AU.    Patient referred back to Irina Hung NP for follow-up evaluation.

## 2024-06-20 ENCOUNTER — E-VISIT (OUTPATIENT)
Dept: FAMILY MEDICINE | Facility: CLINIC | Age: 29
End: 2024-06-20
Payer: COMMERCIAL

## 2024-06-20 DIAGNOSIS — M25.511 ACUTE PAIN OF RIGHT SHOULDER: Primary | ICD-10-CM

## 2024-06-20 RX ORDER — METHOCARBAMOL 750 MG/1
750 TABLET, FILM COATED ORAL 4 TIMES DAILY
Qty: 40 TABLET | Refills: 1 | Status: SHIPPED | OUTPATIENT
Start: 2024-06-20

## 2024-06-20 NOTE — PROGRESS NOTES
Patient ID: Candida Barker is a 29 y.o. female.    Chief Complaint: Back Pain (Entered automatically based on patient selection in Patient Portal.)    The patient initiated a request through [employer] on 6/20/2024 for evaluation and management with a chief complaint of Back Pain (Entered automatically based on patient selection in Patient Portal.)     I evaluated the questionnaire submission on 06/20/2024  .    [facility] Peq Evisit Back Pain    6/20/2024  7:15 AM CDT - Filed by Patient   Do you agree to participate in an E-Visit? Yes   If you have any of the following symptoms, please present to your local emergency room or call 911: I acknowledge   Are you pregnant, could you be pregnant, or are you breast feeding? None of the above   What is the main issue you would like addressed today? Pulled muscle right shoulder blade   Where are you having pain? Upper Back   Does the pain extend into your legs? No   How bad is the pain? The pain is moderate   Did you have an injury that caused the pain? No, I cannot remember an injury   How long has the pain been present? Today and yesterday   Have you had back pain in the past? Yes, I have infrequently had pain similar to this before   Please list any medications or treatments you have used for back pain and indicate if it was effective or not. Ibuprofen- mildly effective   Do you have a fever? No, I do not have a fever   Do you have any of the following? None of the above   What makes the pain worse? Any movement   What makes the pain better? Over the counter pain medicine   Have you ever been diagnosed with cancer? No   Have you ever been diagnosed with degenerative disc disease (arthritis of the spine)? No   Have you ever been diagnosed with osteoporosis or any other bone weakness? No   Have you ever had surgery on your back or spine? No   What is your usual health status? I am active and can move normally   Provide any additional information you feel is important.    Please  attach any relevant images or files    Are you able to take your vital signs? Yes   Systolic Blood Pressure: 124   Diastolic Blood Pressure: 83   Weight: 287   Height: 65   Pulse: 76   Temperature: 98.7   Respiration rate:    Pulse Oxygen: 99         Problem List Items Addressed This Visit          Orthopedic    Acute pain of right shoulder - Primary    Relevant Medications    methocarbamoL (ROBAXIN) 750 MG Tab         Encounter Diagnosis   Name Primary?    Acute pain of right shoulder Yes        No orders of the defined types were placed in this encounter.     Medications Ordered This Encounter   Medications    methocarbamoL (ROBAXIN) 750 MG Tab     Sig: Take 1 tablet (750 mg total) by mouth 4 (four) times daily.     Dispense:  40 tablet     Refill:  1          Follow up as needed       E-Visit Time Trackin min

## 2024-06-21 ENCOUNTER — PATIENT MESSAGE (OUTPATIENT)
Dept: OTOLARYNGOLOGY | Facility: CLINIC | Age: 29
End: 2024-06-21
Payer: COMMERCIAL

## 2024-06-21 ENCOUNTER — LAB VISIT (OUTPATIENT)
Dept: LAB | Facility: HOSPITAL | Age: 29
End: 2024-06-21
Attending: STUDENT IN AN ORGANIZED HEALTH CARE EDUCATION/TRAINING PROGRAM
Payer: COMMERCIAL

## 2024-06-21 ENCOUNTER — OFFICE VISIT (OUTPATIENT)
Dept: FAMILY MEDICINE | Facility: CLINIC | Age: 29
End: 2024-06-21
Payer: COMMERCIAL

## 2024-06-21 VITALS
DIASTOLIC BLOOD PRESSURE: 83 MMHG | HEIGHT: 65 IN | BODY MASS INDEX: 47.48 KG/M2 | TEMPERATURE: 99 F | HEART RATE: 82 BPM | SYSTOLIC BLOOD PRESSURE: 130 MMHG | WEIGHT: 285 LBS

## 2024-06-21 DIAGNOSIS — J02.9 SORE THROAT: ICD-10-CM

## 2024-06-21 DIAGNOSIS — E11.65 TYPE 2 DIABETES MELLITUS WITH HYPERGLYCEMIA, WITH LONG-TERM CURRENT USE OF INSULIN: ICD-10-CM

## 2024-06-21 DIAGNOSIS — E78.2 MIXED HYPERLIPIDEMIA: ICD-10-CM

## 2024-06-21 DIAGNOSIS — J02.0 STREP PHARYNGITIS: Primary | ICD-10-CM

## 2024-06-21 DIAGNOSIS — Z79.4 TYPE 2 DIABETES MELLITUS WITH HYPERGLYCEMIA, WITH LONG-TERM CURRENT USE OF INSULIN: ICD-10-CM

## 2024-06-21 DIAGNOSIS — J02.0 RECURRENT STREPTOCOCCAL PHARYNGITIS: ICD-10-CM

## 2024-06-21 DIAGNOSIS — I10 ESSENTIAL HYPERTENSION: ICD-10-CM

## 2024-06-21 LAB
CTP QC/QA: YES
ESTIMATED AVG GLUCOSE: 157 MG/DL (ref 68–131)
HBA1C MFR BLD: 7.1 % (ref 4–5.6)
S PYO RRNA THROAT QL PROBE: POSITIVE

## 2024-06-21 PROCEDURE — 36415 COLL VENOUS BLD VENIPUNCTURE: CPT | Mod: PO | Performed by: STUDENT IN AN ORGANIZED HEALTH CARE EDUCATION/TRAINING PROGRAM

## 2024-06-21 PROCEDURE — 83036 HEMOGLOBIN GLYCOSYLATED A1C: CPT | Performed by: STUDENT IN AN ORGANIZED HEALTH CARE EDUCATION/TRAINING PROGRAM

## 2024-06-21 PROCEDURE — 99999 PR PBB SHADOW E&M-EST. PATIENT-LVL III: CPT | Mod: PBBFAC,,, | Performed by: PHYSICIAN ASSISTANT

## 2024-06-21 RX ORDER — AMOXICILLIN 500 MG/1
500 TABLET, FILM COATED ORAL EVERY 12 HOURS
Qty: 20 TABLET | Refills: 0 | Status: SHIPPED | OUTPATIENT
Start: 2024-06-21 | End: 2024-07-01

## 2024-06-21 NOTE — PROGRESS NOTES
Assessment/Plan:    Problem List Items Addressed This Visit    None  Visit Diagnoses       Strep pharyngitis    -  Primary    Relevant Medications    amoxicillin (AMOXIL) 500 MG Tab    Recurrent streptococcal pharyngitis        Sore throat        Relevant Orders    POCT Rapid Strep A (Completed)        -start antibiotics as prescribed  -supportive care- rest, increase hydration with water, OTC Tylenol/Ibuprofen for pain/fever  -recommend close follow up with ENT due to recurrent strep x 4  -ER precautions for severe or worsening of symptoms     Follow up if symptoms worsen or fail to improve.    Misa Hamilton PA-C  _____________________________________________________________________________________________________________________________________________________    CC: sore throat     HPI: Patient is in clinic today as an established patient here for sore throat. This is a recurrent problem. The current episode started 4 days ago. The problem is gradually worsening. Associated symptoms include hoarse voice. Pertinent negatives include no fever, chills, nasal congestion, coughing, diaphoresis, ear pain, headaches, neck pain, shortness of breath, sneezing or swollen glands. Past treatments include Claritin. The treatment provided no relief. She has had no known sick contacts. She has had recurrent strep x 3 (4/20/24, 5/9/24, 5/30/24) over the past few months. On 4/20/24, she was treated with Keflex. On 5/9/24, she was treated with a penicillin injection. She was also treated for a R ear infection on 5/17/24 with Ciprodex and Augmentin. Symptoms continued to persist so she received a Rocephin injection and Omnicef on 5/30/24. She followed up with ENT on 6/19/24 for continued symptoms of R ear pain in which her exam was normal. She was instructed to start a daily allergy regimen with an antihistamine, Flonase, and saline. She started noticing a sore throat prior to her ENT visit which has progressively worsened since  that time. No other complaints today.    History reviewed. No pertinent past medical history.  Past Surgical History:   Procedure Laterality Date    CHOLECYSTECTOMY  12/2023     Review of patient's allergies indicates:  No Known Allergies  Social History     Tobacco Use    Smoking status: Never    Smokeless tobacco: Never   Substance Use Topics    Alcohol use: Yes     Alcohol/week: 1.0 standard drink of alcohol     Types: 1 Glasses of wine per week    Drug use: Never     Family History   Problem Relation Name Age of Onset    Diabetes Mother [place]     Early death Mother [place]     Hypertension Mother Beata     Miscarriages / Stillbirths Mother [place]     Drug abuse Father Israel     Hypertension Father [place]     Diabetes Maternal Grandfather Pablo     Cancer Maternal Grandmother Keysha     Vision loss Maternal Grandmother Keysha     Asthma Maternal Aunt Francisca     Hypertension Maternal Aunt Francisca     Stroke Maternal Aunt Francisca     Diabetes Maternal Aunt Kely      Current Outpatient Medications on File Prior to Visit   Medication Sig Dispense Refill    albuterol (PROVENTIL/VENTOLIN HFA) 90 mcg/actuation inhaler Inhale 2 puffs into the lungs every 6 (six) hours as needed for Wheezing. 18 g 1    ALPRAZolam (XANAX) 0.5 MG tablet Take 1 tablet (0.5 mg total) by mouth nightly as needed for Anxiety. 30 tablet 1    apremilast (OTEZLA) 30 mg Tab Take 1 tablet (30 mg total) by mouth 2 (two) times daily. 90 tablet 1    aspirin (ECOTRIN) 81 MG EC tablet       blood-glucose meter,continuous (DEXCOM ) Misc Use as directed 1 each 0    blood-glucose sensor (DEXCOM G6 SENSOR) Ernestine Apply and change every 10 days 3 each 11    blood-glucose transmitter (DEXCOM G6 TRANSMITTER) Ernestine Use as directed every 90 days 1 each 0    carvediloL (COREG) 12.5 MG tablet Take 1 tablet (12.5 mg total) by mouth 2 (two) times daily. 180 tablet 3    enalapril (VASOTEC) 20 MG tablet Take 1 tablet (20 mg total) by mouth 2 (two) times daily. 180  "tablet 3    FLUoxetine 40 MG capsule Take 1 capsule (40 mg total) by mouth once daily. 90 capsule 3    fluticasone propionate (FLONASE) 50 mcg/actuation nasal spray Use 2 sprays to each nostril daily 16 g 0    gabapentin (NEURONTIN) 300 MG capsule Take 1 capsule (300 mg total) by mouth every evening. 30 capsule 2    hydroCHLOROthiazide (HYDRODIURIL) 12.5 MG Tab Take 1 tablet (12.5 mg total) by mouth every morning. 90 tablet 3    hydrOXYzine pamoate (VISTARIL) 25 MG Cap Take 1-2 capsules (25-50 mg total) by mouth daily as needed (insomnia). 90 capsule 1    insulin lispro (HUMALOG KWIKPEN INSULIN) 100 unit/mL pen Inject 5 units before breakfast, 10 units before lunch, 20 units before dinner (35 units per day) 15 mL 2    Lactobacillus rhamnosus GG (CULTURELLE) 10 billion cell capsule Take 1 capsule by mouth once daily.      leflunomide (ARAVA) 20 MG Tab Take 20 mg by mouth once daily.      levocetirizine (XYZAL) 5 MG tablet Take 5 mg by mouth every evening.      levothyroxine (SYNTHROID) 200 MCG tablet Take 1 tablet (200 mcg total) by mouth before breakfast. 90 tablet 3    levothyroxine (SYNTHROID) 50 MCG tablet Take 1 tablet (50 mcg total) by mouth before breakfast. 90 tablet 3    metFORMIN (GLUCOPHAGE-XR) 500 MG ER 24hr tablet Take 1,000 mg by mouth 2 (two) times daily with meals.      methocarbamoL (ROBAXIN) 750 MG Tab Take 1 tablet (750 mg total) by mouth 4 (four) times daily. 40 tablet 1    ondansetron (ZOFRAN-ODT) 4 MG TbDL Take 1 tablet (4 mg total) by mouth every 8 (eight) hours as needed (nausea/vomiting). 12 tablet 0    pen needle, diabetic 32 gauge x 5/32" Ndle 4 injections daily      rosuvastatin (CRESTOR) 5 MG tablet Take 1 tablet (5 mg total) by mouth once daily. 90 tablet 3    tirzepatide 10 mg/0.5 mL PnIj Inject 10 mg into the skin every 7 days. 4 Pen 2    TOUJEO MAX U-300 SOLOSTAR 300 unit/mL (3 mL) insulin pen Inject 50 Units into the skin once daily.       No current facility-administered medications " "on file prior to visit.       Review of Systems   Constitutional:  Negative for chills, diaphoresis, fatigue and fever.   HENT:  Positive for sore throat and voice change. Negative for congestion, ear pain, postnasal drip and sinus pain.    Eyes:  Negative for pain and redness.   Respiratory:  Negative for cough, chest tightness and shortness of breath.    Cardiovascular:  Negative for chest pain and leg swelling.   Gastrointestinal:  Negative for abdominal pain, constipation, diarrhea, nausea and vomiting.   Genitourinary:  Negative for dysuria and hematuria.   Musculoskeletal:  Negative for arthralgias and joint swelling.   Skin:  Negative for rash.   Neurological:  Negative for dizziness, syncope and headaches.   Psychiatric/Behavioral:  Negative for dysphoric mood. The patient is not nervous/anxious.        Vitals:    06/21/24 1155   BP: 130/83   Pulse: 82   Temp: 98.7 °F (37.1 °C)   Weight: 129.3 kg (285 lb)   Height: 5' 5" (1.651 m)       Wt Readings from Last 3 Encounters:   06/21/24 129.3 kg (285 lb)   06/19/24 130.6 kg (287 lb 14.7 oz)   05/17/24 130.6 kg (288 lb)       Physical Exam  Constitutional:       General: She is not in acute distress.     Appearance: Normal appearance. She is well-developed.   HENT:      Head: Normocephalic and atraumatic.      Right Ear: Tympanic membrane normal.      Left Ear: Tympanic membrane normal.      Nose: Nose normal.      Mouth/Throat:      Mouth: Mucous membranes are moist.      Pharynx: Pharyngeal swelling and posterior oropharyngeal erythema present.      Tonsils: 1+ on the right. 1+ on the left.   Eyes:      Conjunctiva/sclera: Conjunctivae normal.   Cardiovascular:      Rate and Rhythm: Normal rate and regular rhythm.      Pulses: Normal pulses.      Heart sounds: Normal heart sounds. No murmur heard.  Pulmonary:      Effort: Pulmonary effort is normal. No respiratory distress.      Breath sounds: Normal breath sounds.   Abdominal:      General: Bowel sounds are " normal. There is no distension.      Palpations: Abdomen is soft.      Tenderness: There is no abdominal tenderness.   Musculoskeletal:         General: Normal range of motion.      Cervical back: Normal range of motion and neck supple.   Lymphadenopathy:      Cervical: No cervical adenopathy.   Skin:     General: Skin is warm and dry.      Findings: No rash.   Neurological:      General: No focal deficit present.      Mental Status: She is alert and oriented to person, place, and time.   Psychiatric:         Mood and Affect: Mood normal.         Behavior: Behavior normal.         Health Maintenance   Topic Date Due    Foot Exam  Never done    Pap Smear  Never done    Hemoglobin A1c  06/20/2024    Eye Exam  01/10/2025    Lipid Panel  03/20/2025    TETANUS VACCINE  10/14/2032    Hepatitis C Screening  Completed

## 2024-06-24 DIAGNOSIS — E11.65 TYPE 2 DIABETES MELLITUS WITH HYPERGLYCEMIA, WITH LONG-TERM CURRENT USE OF INSULIN: Primary | ICD-10-CM

## 2024-06-24 DIAGNOSIS — Z79.4 TYPE 2 DIABETES MELLITUS WITH HYPERGLYCEMIA, WITH LONG-TERM CURRENT USE OF INSULIN: Primary | ICD-10-CM

## 2024-06-24 NOTE — PROGRESS NOTES
A1c and bmp    I have sent a msg to patient with the following interpretation (see below):    Your A1c is ALMOST at goal (<7) !! This is excellent! Continue medications as prescribed; limiting sugary drinks (juice, soda, sports drinks) and sweets; Also, ensure daily exercise. We will repeat in     Please do not hesitate to call or message with any questions or concerns    Kaylee Yeager MD

## 2024-06-26 ENCOUNTER — E-VISIT (OUTPATIENT)
Dept: FAMILY MEDICINE | Facility: CLINIC | Age: 29
End: 2024-06-26
Payer: COMMERCIAL

## 2024-06-26 ENCOUNTER — TELEPHONE (OUTPATIENT)
Dept: FAMILY MEDICINE | Facility: CLINIC | Age: 29
End: 2024-06-26

## 2024-06-26 DIAGNOSIS — S69.91XA INJURY OF RIGHT WRIST, INITIAL ENCOUNTER: Primary | ICD-10-CM

## 2024-06-26 NOTE — PROGRESS NOTES
Patient ID: Candida Barker is a 29 y.o. female.    Chief Complaint: Wrist Injury (/)    The patient initiated a request through [facility] on 6/26/2024 for evaluation and management with a chief complaint of Wrist Injury (/)     I evaluated the questionnaire submission on 6/26/24.    [facility]    6/26/2024  9:46 AM CDT - Filed by Patient   Do you agree to participate in an E-Visit? Yes   If you have any of the following symptoms, please present to your local emergency room or call 911:  I acknowledge   Are you pregnant, could you be pregnant, or are you breast feeding? None of the above   What is the main issue you would like addressed today? Right wrist pain   Please describe your symptoms Right wrist pain after being hit with cabinet   Where is your problem located? Right wrist   How severe are your symptoms? Mild   Have you had these symptoms before? No   How long have you been having these symptoms? Just today   Please list any medications or treatments you have used for your condition and indicate if it was effective or not. Ibuprofen   What makes this feel better? Immobilization   What makes this feel worse? Movement   Are these symptoms related to a condition that you currently have? No   Please describe any probable cause for these symptoms Hit with cabinet door   Provide any additional information you feel is important. None   Please attach any relevant images or files    Are you able to take your vital signs? Yes   Systolic Blood Pressure: 124   Diastolic Blood Pressure: 86   Weight: 285   Height: 65   Pulse: 94   Temperature:    Respiration rate:    Pulse Oxygen: 100          Active Problem List with Overview Notes    Diagnosis Date Noted    Acute pain of right shoulder 06/20/2024     acute onset  - prn Robaxin  - prn tylenol, heat/ice therapy, stretches   Prn rtc         Plaque psoriasis 05/09/2024    KEIRA (obstructive sleep apnea) 01/09/2024      Home sleep study      Morbid obesity 05/17/2018      Wt Readings from Last 3 Encounters:   03/19/24 1249 131.5 kg (290 lb)       Diabetes Medications               insulin lispro (HUMALOG U-100 INSULIN) 100 unit/mL Crtg Inject 90 Units into the skin 3 (three) times daily. 15 am, 20 lunch, 25 dinner    metFORMIN (GLUCOPHAGE-XR) 500 MG ER 24hr tablet Take 1,000 mg by mouth 2 (two) times daily with meals.    MOUNJARO 7.5 mg/0.5 mL PnIj Inject 7.5 mg into the skin every 7 days.    TOUJEO MAX U-300 SOLOSTAR 300 unit/mL (3 mL) insulin pen Inject 70 Units into the skin once daily.            General weight loss/lifestyle modification strategies discussed: limit sugary drinks, exercise 3-5x per week  Informal exercise measures discussed, e.g. taking stairs instead of elevator.              Essential hypertension 04/21/2017     -at goal today  - Current Hypertension Medications:   Hypertension Medications               carvediloL (COREG) 12.5 MG tablet Take 1 tablet (12.5 mg total) by mouth 2 (two) times daily.    enalapril (VASOTEC) 20 MG tablet Take 20 mg by mouth 2 (two) times daily.    hydroCHLOROthiazide (HYDRODIURIL) 12.5 MG Tab Take 1 tablet (12.5 mg total) by mouth every morning.          -continue lifestyle modification with low sodium diet and exercise   -discussed hypertension disease course and importance of treating high blood pressure  -patient understood and advised of risk of untreated blood pressure.  ER precautions were given   for symptoms of hypertensive urgency and emergency.        Hyperlipidemia 04/21/2017     -chronic condition. Currently stable.      Hyperlipidemia Medications               rosuvastatin (CRESTOR) 5 MG tablet Take 1 tablet (5 mg total) by mouth once daily.              Lab Results   Component Value Date    CHOL 231 (H) 03/20/2024    CHOL 182 05/16/2023    CHOL 201 (H) 02/17/2023     Lab Results   Component Value Date    HDL 43 03/20/2024    HDL 35 05/16/2023    HDL 40 02/17/2023     Lab Results   Component Value Date    LDLCALC 150.8  03/20/2024    LDLCALC 125 (H) 05/16/2023    LDLCALC 139 (H) 02/17/2023     Lab Results   Component Value Date    TRIG 186 (H) 03/20/2024    TRIG 110 05/16/2023    TRIG 109 02/17/2023     Lab Results   Component Value Date    CHOLHDL 18.6 (L) 03/20/2024    CHOLHDL 5.2 (H) 05/16/2023    CHOLHDL 5.0 (H) 02/17/2023          The ASCVD Risk score (Gladis BRANHAM, et al., 2019) failed to calculate for the following reasons:    The 2019 ASCVD risk score is only valid for ages 40 to 79        Postablative hypothyroidism 04/21/2017     S/p ablation  Chronic hx; stable on synthroid  - denies symptoms   Lab Results   Component Value Date    TSH 0.904 03/20/2024       - cont current dose as rxd      Type 2 diabetes mellitus with hyperglycemia, with long-term current use of insulin 04/21/2017     uncontrolled   Had been off meds for few months, restart recheck A1c 3m  Lab Results   Component Value Date    HGBA1C 9.4 (H) 03/20/2024     Hypoglycemic Events: none     -current meds:   Diabetes Medications               insulin lispro (HUMALOG U-100 INSULIN) 100 unit/mL Crtg Inject 90 Units into the skin 3 (three) times daily. 15 am, 20 lunch, 25 dinner    metFORMIN (GLUCOPHAGE-XR) 500 MG ER 24hr tablet Take 1,000 mg by mouth 2 (two) times daily with meals.    MOUNJARO 7.5 mg/0.5 mL PnIj Inject 7.5 mg into the skin every 7 days.    TOUJEO MAX U-300 SOLOSTAR 300 unit/mL (3 mL) insulin pen Inject 70 Units into the skin once daily.          -on statin:   Hyperlipidemia Medications               rosuvastatin (CRESTOR) 5 MG tablet Take 1 tablet (5 mg total) by mouth once daily.          -on ACE-I/ARB:   Hypertension Medications               carvediloL (COREG) 12.5 MG tablet Take 1 tablet (12.5 mg total) by mouth 2 (two) times daily.    enalapril (VASOTEC) 20 MG tablet Take 20 mg by mouth 2 (two) times daily.    hydroCHLOROthiazide (HYDRODIURIL) 12.5 MG Tab Take 1 tablet (12.5 mg total) by mouth every morning.          -counseling provided on  importance of diabetic diet and medication compliance in order to treat diabetes  -discussed diabetes disease course and potential complications  Follow up 3 months           Recent Labs Obtained:  Office Visit on 06/21/2024   Component Date Value Ref Range Status    Rapid Strep A Screen 06/21/2024 Positive (A)  Negative Final     Acceptable 06/21/2024 Yes   Final   Lab Visit on 06/21/2024   Component Date Value Ref Range Status    Hemoglobin A1C 06/21/2024 7.1 (H)  4.0 - 5.6 % Final    Comment: ADA Screening Guidelines:  5.7-6.4%  Consistent with prediabetes  >or=6.5%  Consistent with diabetes    High levels of fetal hemoglobin interfere with the HbA1C  assay. Heterozygous hemoglobin variants (HbS, HgC, etc)do  not significantly interfere with this assay.   However, presence of multiple variants may affect accuracy.      Estimated Avg Glucose 06/21/2024 157 (H)  68 - 131 mg/dL Final       Encounter Diagnosis   Name Primary?    Injury of right wrist, initial encounter Yes        Orders Placed This Encounter   Procedures    X-Ray Wrist Complete Right     Standing Status:   Future     Standing Expiration Date:   6/26/2025     Order Specific Question:   May the Radiologist modify the order per protocol to meet the clinical needs of the patient?     Answer:   Yes     Order Specific Question:   Release to patient     Answer:   Immediate            E-Visit Time Tracking:

## 2024-06-27 ENCOUNTER — HOSPITAL ENCOUNTER (OUTPATIENT)
Dept: RADIOLOGY | Facility: HOSPITAL | Age: 29
Discharge: HOME | End: 2024-06-27
Attending: PHYSICIAN ASSISTANT
Payer: COMMERCIAL

## 2024-06-27 DIAGNOSIS — S69.91XA INJURY OF RIGHT WRIST, INITIAL ENCOUNTER: ICD-10-CM

## 2024-06-27 PROCEDURE — 73110 X-RAY EXAM OF WRIST: CPT | Mod: TC,PO,RT

## 2024-06-27 PROCEDURE — 73110 X-RAY EXAM OF WRIST: CPT | Mod: 26,RT,, | Performed by: RADIOLOGY

## 2024-06-28 ENCOUNTER — PATIENT MESSAGE (OUTPATIENT)
Dept: ADMINISTRATIVE | Facility: OTHER | Age: 29
End: 2024-06-28
Payer: COMMERCIAL

## 2024-07-01 ENCOUNTER — OFFICE VISIT (OUTPATIENT)
Dept: OTOLARYNGOLOGY | Facility: CLINIC | Age: 29
End: 2024-07-01
Payer: COMMERCIAL

## 2024-07-01 VITALS — HEIGHT: 65 IN | BODY MASS INDEX: 48.12 KG/M2 | WEIGHT: 288.81 LBS

## 2024-07-01 DIAGNOSIS — J02.0 RECURRENT STREPTOCOCCAL PHARYNGITIS: Primary | ICD-10-CM

## 2024-07-01 LAB — GROUP A STREP, MOLECULAR: POSITIVE

## 2024-07-01 PROCEDURE — 87651 STREP A DNA AMP PROBE: CPT | Mod: PO | Performed by: OTOLARYNGOLOGY

## 2024-07-01 PROCEDURE — 4010F ACE/ARB THERAPY RXD/TAKEN: CPT | Mod: CPTII,S$GLB,, | Performed by: OTOLARYNGOLOGY

## 2024-07-01 PROCEDURE — 3008F BODY MASS INDEX DOCD: CPT | Mod: CPTII,S$GLB,, | Performed by: OTOLARYNGOLOGY

## 2024-07-01 PROCEDURE — 99214 OFFICE O/P EST MOD 30 MIN: CPT | Mod: S$GLB,,, | Performed by: OTOLARYNGOLOGY

## 2024-07-01 PROCEDURE — 3051F HG A1C>EQUAL 7.0%<8.0%: CPT | Mod: CPTII,S$GLB,, | Performed by: OTOLARYNGOLOGY

## 2024-07-01 PROCEDURE — 3066F NEPHROPATHY DOC TX: CPT | Mod: CPTII,S$GLB,, | Performed by: OTOLARYNGOLOGY

## 2024-07-01 PROCEDURE — 99999 PR PBB SHADOW E&M-EST. PATIENT-LVL III: CPT | Mod: PBBFAC,,, | Performed by: OTOLARYNGOLOGY

## 2024-07-01 PROCEDURE — 3061F NEG MICROALBUMINURIA REV: CPT | Mod: CPTII,S$GLB,, | Performed by: OTOLARYNGOLOGY

## 2024-07-01 PROCEDURE — 1159F MED LIST DOCD IN RCRD: CPT | Mod: CPTII,S$GLB,, | Performed by: OTOLARYNGOLOGY

## 2024-07-01 PROCEDURE — 1160F RVW MEDS BY RX/DR IN RCRD: CPT | Mod: CPTII,S$GLB,, | Performed by: OTOLARYNGOLOGY

## 2024-07-01 RX ORDER — MUPIROCIN 20 MG/G
OINTMENT TOPICAL
Qty: 22 G | Refills: 1 | Status: SHIPPED | OUTPATIENT
Start: 2024-07-01

## 2024-07-01 RX ORDER — AMOXICILLIN 500 MG/1
500 CAPSULE ORAL EVERY 12 HOURS
COMMUNITY
Start: 2024-06-21

## 2024-07-01 NOTE — PROGRESS NOTES
Subjective:       Patient ID: Candida Barker is a 29 y.o. female.    Chief Complaint: Otitis Media and Sore Throat (Reoccurring strep and ear infections )    Candida is here for follow-up.   Here today for throat concerns.  She reports 4 episodes of strep in the past 2 months. 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   Symptoms include sore/painful throat, ulcerations of throat, dysphagia, occ fever, no nausea / emesis.   Prior to that she does not have a  history of strep throat other than maybe a few times.  Currently throat feels fine - back to baseline. Finishing Amoxil tonight.    No family history of bleeding disorders.    Also with right otalgia for 3 months - she reports a chronic pain inside the ear as well as a specific spot that is painful. She has used Ciprodex drops as well as the systemic abx from strep.    Patient validated questionnaires (if applicable):      %            No data to display                   No data to display                   No data to display                     Review of Systems   Constitutional: Negative for activity change and appetite change.   Respiratory: Negative for difficulty breathing and wheezing   Cardiovascular: Negative for chest pain.      Objective:        Constitutional:   Vital signs are normal. She appears well-developed and well-nourished.     Head:  Normocephalic and atraumatic.     Ears:  Hearing normal to normal and whispered voice; external ear normal without scars, lesions, or masses; ear canal, tympanic membrane, and middle ear normal..     Nose:  Nose normal including turbinates, nasal mucosa, sinuses and nasal septum.     Mouth/Throat  Oropharynx clear and moist without lesions or asymmetry. Tonsils present, +1.    Normal appearing tonsils with no exudate or erythema.       Neck:  Neck normal without thyromegaly masses, asymmetry, normal tracheal structure, crepitus, and tenderness.         Tests / Results:  none    Assessment:       1.  Recurrent streptococcal pharyngitis          Plan:         She is about to be done with abx - tonsils / pharynx normal and asymptomatic. Swabbed today to see if carrier - she is positive, so this raises of possibility of carrier.  Will reswab in 4 weeks. Discussed that this may have impact on swabbing throat in context of other viral illnesses.   Tonsillectomy discussed if persistent true Strep infections.     I discussed the risks of tonsillectomy/adenoidectomy, including bleeding, recurrence/persistence of issues (regrowth), need for further procedures, taste changes, injury to mouth/lips, tongue numbness, speech/swallowing changes, VPI.

## 2024-07-02 ENCOUNTER — OFFICE VISIT (OUTPATIENT)
Dept: OBSTETRICS AND GYNECOLOGY | Facility: CLINIC | Age: 29
End: 2024-07-02
Payer: COMMERCIAL

## 2024-07-02 ENCOUNTER — PATIENT OUTREACH (OUTPATIENT)
Dept: ADMINISTRATIVE | Facility: HOSPITAL | Age: 29
End: 2024-07-02
Payer: COMMERCIAL

## 2024-07-02 VITALS
WEIGHT: 288.13 LBS | SYSTOLIC BLOOD PRESSURE: 124 MMHG | BODY MASS INDEX: 47.95 KG/M2 | DIASTOLIC BLOOD PRESSURE: 70 MMHG

## 2024-07-02 DIAGNOSIS — Z01.419 ENCOUNTER FOR GYNECOLOGICAL EXAMINATION (GENERAL) (ROUTINE) WITHOUT ABNORMAL FINDINGS: Primary | ICD-10-CM

## 2024-07-02 DIAGNOSIS — Z12.4 PAP SMEAR FOR CERVICAL CANCER SCREENING: ICD-10-CM

## 2024-07-02 DIAGNOSIS — Z12.4 CERVICAL CANCER SCREENING: ICD-10-CM

## 2024-07-02 PROCEDURE — 3051F HG A1C>EQUAL 7.0%<8.0%: CPT | Mod: CPTII,S$GLB,, | Performed by: OBSTETRICS & GYNECOLOGY

## 2024-07-02 PROCEDURE — 3074F SYST BP LT 130 MM HG: CPT | Mod: CPTII,S$GLB,, | Performed by: OBSTETRICS & GYNECOLOGY

## 2024-07-02 PROCEDURE — 3066F NEPHROPATHY DOC TX: CPT | Mod: CPTII,S$GLB,, | Performed by: OBSTETRICS & GYNECOLOGY

## 2024-07-02 PROCEDURE — 88175 CYTOPATH C/V AUTO FLUID REDO: CPT | Performed by: OBSTETRICS & GYNECOLOGY

## 2024-07-02 PROCEDURE — 4010F ACE/ARB THERAPY RXD/TAKEN: CPT | Mod: CPTII,S$GLB,, | Performed by: OBSTETRICS & GYNECOLOGY

## 2024-07-02 PROCEDURE — 3078F DIAST BP <80 MM HG: CPT | Mod: CPTII,S$GLB,, | Performed by: OBSTETRICS & GYNECOLOGY

## 2024-07-02 PROCEDURE — 99395 PREV VISIT EST AGE 18-39: CPT | Mod: S$GLB,,, | Performed by: OBSTETRICS & GYNECOLOGY

## 2024-07-02 PROCEDURE — 99999 PR PBB SHADOW E&M-EST. PATIENT-LVL III: CPT | Mod: PBBFAC,,, | Performed by: OBSTETRICS & GYNECOLOGY

## 2024-07-02 PROCEDURE — 3061F NEG MICROALBUMINURIA REV: CPT | Mod: CPTII,S$GLB,, | Performed by: OBSTETRICS & GYNECOLOGY

## 2024-07-02 PROCEDURE — 3008F BODY MASS INDEX DOCD: CPT | Mod: CPTII,S$GLB,, | Performed by: OBSTETRICS & GYNECOLOGY

## 2024-07-02 NOTE — PROGRESS NOTES
Chief Complaint   Patient presents with    Annual Exam     History and Physical:  Candida Barker is a 29 y.o. F who presents today for her routine annual GYN exam. The patient has Gynecology complaints: complains of pain with intercourse, deep penetration, for a few months. Denies PCOS, fibroids, or ovarian cyst. Complains of infertility x 4 years, seeing Dr. Davis. 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     Annual:  Patient's last menstrual period was 06/16/2024 (exact date).  Contraception: none  Menarche: 16  Menstrual cycle: monthly, lasting 7days, heavy to normal flow, & dull aching pain  Last Pap: 3y ago, normal per patient  History of abnormal pap: never  STD testing: declines  Immunization status: up to date and documented. Status post HPV vaccination  PCP labs 3/2024    Allergies: Review of patient's allergies indicates:  No Known Allergies  History reviewed. No pertinent past medical history.  Past Surgical History:   Procedure Laterality Date    CHOLECYSTECTOMY  12/2023     MEDS:   Current Outpatient Medications on File Prior to Visit   Medication Sig Dispense Refill    albuterol (PROVENTIL/VENTOLIN HFA) 90 mcg/actuation inhaler Inhale 2 puffs into the lungs every 6 (six) hours as needed for Wheezing. 18 g 1    ALPRAZolam (XANAX) 0.5 MG tablet Take 1 tablet (0.5 mg total) by mouth nightly as needed for Anxiety. 30 tablet 1    amoxicillin (AMOXIL) 500 MG capsule Take 500 mg by mouth every 12 (twelve) hours. for ten days      amoxicillin (AMOXIL) 500 MG Tab Take 1 tablet (500 mg total) by mouth every 12 (twelve) hours. for 10 days 20 tablet 0    apremilast (OTEZLA) 30 mg Tab Take 1 tablet (30 mg total) by mouth 2 (two) times daily. 90 tablet 1    aspirin (ECOTRIN) 81 MG EC tablet       blood-glucose meter,continuous (DEXCOM ) Misc Use as directed 1 each 0    blood-glucose sensor (DEXCOM G6 SENSOR) Ernestine Apply and change every 10 days 3 each 11    blood-glucose transmitter (DEXCOM G6 TRANSMITTER) Ernestine Use as directed  "every 90 days 1 each 0    carvediloL (COREG) 12.5 MG tablet Take 1 tablet (12.5 mg total) by mouth 2 (two) times daily. 180 tablet 3    enalapril (VASOTEC) 20 MG tablet Take 1 tablet (20 mg total) by mouth 2 (two) times daily. 180 tablet 3    FLUoxetine 40 MG capsule Take 1 capsule (40 mg total) by mouth once daily. 90 capsule 3    fluticasone propionate (FLONASE) 50 mcg/actuation nasal spray Use 2 sprays to each nostril daily 16 g 0    gabapentin (NEURONTIN) 300 MG capsule Take 1 capsule (300 mg total) by mouth every evening. 30 capsule 2    hydroCHLOROthiazide (HYDRODIURIL) 12.5 MG Tab Take 1 tablet (12.5 mg total) by mouth every morning. 90 tablet 3    hydrOXYzine pamoate (VISTARIL) 25 MG Cap Take 1-2 capsules (25-50 mg total) by mouth daily as needed (insomnia). 90 capsule 1    insulin lispro (HUMALOG KWIKPEN INSULIN) 100 unit/mL pen Inject 5 units before breakfast, 10 units before lunch, 20 units before dinner (35 units per day) 15 mL 2    Lactobacillus rhamnosus GG (CULTURELLE) 10 billion cell capsule Take 1 capsule by mouth once daily.      leflunomide (ARAVA) 20 MG Tab Take 20 mg by mouth once daily.      levocetirizine (XYZAL) 5 MG tablet Take 5 mg by mouth every evening.      levothyroxine (SYNTHROID) 200 MCG tablet Take 1 tablet (200 mcg total) by mouth before breakfast. 90 tablet 3    levothyroxine (SYNTHROID) 50 MCG tablet Take 1 tablet (50 mcg total) by mouth before breakfast. 90 tablet 3    metFORMIN (GLUCOPHAGE-XR) 500 MG ER 24hr tablet Take 1,000 mg by mouth 2 (two) times daily with meals.      methocarbamoL (ROBAXIN) 750 MG Tab Take 1 tablet (750 mg total) by mouth 4 (four) times daily. 40 tablet 1    mupirocin (BACTROBAN) 2 % ointment Apply pea sized amount to inside of the right ear twice daily for 10 days 22 g 1    ondansetron (ZOFRAN-ODT) 4 MG TbDL Take 1 tablet (4 mg total) by mouth every 8 (eight) hours as needed (nausea/vomiting). 12 tablet 0    pen needle, diabetic 32 gauge x 5/32" Ndle 4 " injections daily      rosuvastatin (CRESTOR) 5 MG tablet Take 1 tablet (5 mg total) by mouth once daily. 90 tablet 3    tirzepatide 10 mg/0.5 mL PnIj Inject 10 mg into the skin every 7 days. 4 Pen 2    TOUJEO MAX U-300 SOLOSTAR 300 unit/mL (3 mL) insulin pen Inject 50 Units into the skin once daily.       No current facility-administered medications on file prior to visit.     OB History    No obstetric history on file.       Social History     Socioeconomic History    Marital status:    Tobacco Use    Smoking status: Never    Smokeless tobacco: Never   Substance and Sexual Activity    Alcohol use: Yes     Alcohol/week: 1.0 standard drink of alcohol     Types: 1 Glasses of wine per week    Drug use: Never    Sexual activity: Yes     Partners: Male     Birth control/protection: None     Social Determinants of Health     Financial Resource Strain: Low Risk  (3/18/2024)    Overall Financial Resource Strain (CARDIA)     Difficulty of Paying Living Expenses: Not very hard   Food Insecurity: No Food Insecurity (3/18/2024)    Hunger Vital Sign     Worried About Running Out of Food in the Last Year: Never true     Ran Out of Food in the Last Year: Never true   Transportation Needs: No Transportation Needs (3/18/2024)    PRAPARE - Transportation     Lack of Transportation (Medical): No     Lack of Transportation (Non-Medical): No   Physical Activity: Sufficiently Active (3/18/2024)    Exercise Vital Sign     Days of Exercise per Week: 4 days     Minutes of Exercise per Session: 40 min   Stress: Stress Concern Present (3/18/2024)    [employer] [place] [employer] - Occupational Stress Questionnaire     Feeling of Stress : To some extent   Housing Stability: Low Risk  (3/18/2024)    Housing Stability Vital Sign     Unable to Pay for Housing in the Last Year: No     Number of Places Lived in the Last Year: 1     Unstable Housing in the Last Year: No     Family History   Problem Relation Name Age of Onset     Diabetes Mother Beata     Early death Mother Beata     Hypertension Mother Beata     Miscarriages / Stillbirths Mother [place]     Drug abuse Father Israel     Hypertension Father Israel     Diabetes Maternal Grandfather Pablo     Cancer Maternal Grandmother Keysha     Vision loss Maternal Grandmother Keysha     Asthma Maternal Aunt Francisca     Hypertension Maternal Aunt Francisca     Stroke Maternal Aunt Francisca     Diabetes Maternal Aunt Kely        Past medical and surgical history reviewed.   I have reviewed the patient's medical history in detail and updated the computerized patient record.    Review of System:   General: no chills, fever, night sweats, weight gain or weight loss  Breasts: no new or changing breast lumps, nipple discharge or masses.  Gastrointestinal: no abdominal pain, change in bowel habits, or black or bloody stools  Genito-Urinary: no incontinence, urinary frequency/urgency or vulvar/vaginal symptoms, pelvic pain or abnormal vaginal bleeding.    Physical Exam:   /70   Wt 130.7 kg (288 lb 2.3 oz)   LMP 06/16/2024 (Exact Date)   BMI 47.95 kg/m²   Body mass index is 47.95 kg/m².  Constitutional: She appears alert and responsive. She appears well-developed, well-groomed, and well-nourished. No distress.  Breasts: are symmetrical.  Right breast exhibits no inverted nipple, no mass, no nipple discharge, no skin change and no tenderness.  Left breast exhibits no inverted nipple, no mass, no nipple discharge, no skin change and no tenderness.  Abdominal: Soft. She exhibits no distension, hernias or masses. There is no tenderness. No enlargement of liver edge or spleen.  There is no rebound and no guarding.   Genitourinary:    External rectal exam shows no thrombosed external hemorrhoids, no lesions.     Pelvic exam was performed with patient supine.   No labial fusion, and symmetrical.    There is no rash, lesion or injury on the right labia.   There is no rash, lesion or injury on the left  labia.   No bleeding and no signs of injury around the vaginal introitus, urethral meatus is normal size and without prolapse or lesions, urethra well supported. The cervix is visualized with no discharge, lesions or friability.   No vaginal discharge found.    No significant Cystocele, Enterocele or rectocele, and cervix and uterus well supported.   Bimanual exam:   The urethra is normal to palpation and there are no palpable vaginal wall masses.   Uterus is not deviated, not enlarged, not fixed, normal shape and not tender.   Cervix exhibits no motion tenderness.    Right adnexum displays no mass or nodularity and no tenderness.   Left adnexum displays no mass or nodularity and no tenderness.    Assessment/Plan:   Encounter for gynecological examination (general) (routine) without abnormal findings    Cervical cancer screening  -     Liquid-Based Pap Smear, Screening    Pap smear for cervical cancer screening  -     Ambulatory referral/consult to Obstetrics / Gynecology        Follow up in 1 year.

## 2024-07-02 NOTE — PROGRESS NOTES
VBHM Score: 1     Cervical Cancer Screening                       Health Maintenance Topic(s) Outreach Outcomes & Actions Taken:    Cervical Cancer Screening - Outreach Outcomes & Actions Taken  : Done today with GYN    Diabetic Foot Exam - Outreach Outcomes & Actions Taken  : Updated        Additional Notes:                 Care Management, Digital Medicine, and/or Education Referrals    OPCM Risk Score: 46.9         Next Steps - Referral Actions: No answer         Additional Notes:  Last [facility] Reviewed March 2024

## 2024-07-03 LAB
CLINICAL INFO: NORMAL
DATE OF PREVIOUS PAP: NORMAL
DATE PREVIOUS BX: NORMAL
LMP START DATE: NORMAL
SPECIMEN SOURCE CVX/VAG CYTO: NORMAL

## 2024-07-08 DIAGNOSIS — E11.65 TYPE 2 DIABETES MELLITUS WITH HYPERGLYCEMIA, WITH LONG-TERM CURRENT USE OF INSULIN: ICD-10-CM

## 2024-07-08 DIAGNOSIS — Z79.4 TYPE 2 DIABETES MELLITUS WITH HYPERGLYCEMIA, WITH LONG-TERM CURRENT USE OF INSULIN: ICD-10-CM

## 2024-07-08 RX ORDER — BLOOD-GLUCOSE TRANSMITTER
EACH MISCELLANEOUS
Qty: 1 EACH | Refills: 3 | Status: SHIPPED | OUTPATIENT
Start: 2024-07-08

## 2024-07-08 NOTE — TELEPHONE ENCOUNTER
Refill Decision Note   Candida Barker  is requesting a refill authorization.  Brief Assessment and Rationale for Refill:  Approve     Medication Therapy Plan:         Comments:     Note composed:2:28 PM 07/08/2024

## 2024-07-08 NOTE — TELEPHONE ENCOUNTER
No care due was identified.  [facility] Embedded Care Due Messages. Reference number: [id number].   7/08/2024 3:57:46 AM CDT

## 2024-07-10 ENCOUNTER — PATIENT MESSAGE (OUTPATIENT)
Dept: OTOLARYNGOLOGY | Facility: CLINIC | Age: 29
End: 2024-07-10
Payer: COMMERCIAL

## 2024-07-24 ENCOUNTER — E-VISIT (OUTPATIENT)
Dept: OBSTETRICS AND GYNECOLOGY | Facility: CLINIC | Age: 29
End: 2024-07-24
Payer: COMMERCIAL

## 2024-07-24 DIAGNOSIS — B37.31 YEAST VAGINITIS: Primary | ICD-10-CM

## 2024-07-24 RX ORDER — FLUCONAZOLE 150 MG/1
150 TABLET ORAL ONCE
Qty: 1 TABLET | Refills: 0 | Status: SHIPPED | OUTPATIENT
Start: 2024-07-24 | End: 2024-07-24

## 2024-07-24 NOTE — PROGRESS NOTES
Patient ID: Candida Barker is a 29 y.o. female.    Chief Complaint: Vaginal Discharge (Entered automatically based on patient selection in [employer])    The patient initiated a request through [employer] on 7/24/2024 for evaluation and management with a chief complaint of Vaginal Discharge (Entered automatically based on patient selection in [employer])     I evaluated the questionnaire submission on 7/24/2024.    Ohs Peq Evisit Vaginal Discharge    7/24/2024 11:41 AM CDT - Filed by Patient   Do you agree to participate in an E-Visit? Yes   If you have any of the following symptoms,  please do not complete an E-Visit,  schedule an appointment with your provider: I acknowledge   Are you pregnant, could you be pregnant, or are you breast feeding? None of the above   What is the main issue you would like addressed today? Yeast infection   Which of the following are you experiencing? Vaginal Itching;  Vaginal discharge    Are you having pain while passing urine? No, I have no pain while urinating.   Which of the following applies to your vaginal discharge? I have a white/milky discharge.    Which of the following are you experiencing? Lower back pain;  Pain on intercourse   Do you have any sores on your genitals? No    Have you taken antibiotics recently? I have not been on any antibiotics    Do you use any of the following? None of the above   Which of the following applies to your menstrual period? I had a menstrual period in the last 2 weeks.   Have you had similar symptoms in the past? Yes, I have these symptoms frequently.   When you had similar symptoms in the past, did any of the following work? Pills for yeast infection   Have you had a fever? No   During the last 2 months, have you had sexual contact with a specific person for the first time? No   Provide any additional information you feel is important.    Please attach any relevant images or files    Are you able to take your vital signs? Yes   Systolic Blood  Pressure: 132   Diastolic Blood Pressure: 76   Weight: 288   Height: 65   Pulse: 88   Temperature:    Respiration rate:    Pulse Oxygen: 99         Encounter Diagnosis   Name Primary?    Yeast vaginitis Yes      Yeast infections can be treated with over the counter medication.   You can try Monistat 7, which is Miconazole 2% cream 5 g intravaginally daily for 7 days.     I have sent in a script for Diflucan x1 dose.     If no improvement, then you will need a visit for testing to confirm it is a yeast infection, and not another kind of infection.     Bree Montgomery MD    No orders of the defined types were placed in this encounter.     Medications Ordered This Encounter   Medications    fluconazole (DIFLUCAN) 150 MG Tab     Sig: Take 1 tablet (150 mg total) by mouth once. for 1 dose     Dispense:  1 tablet     Refill:  0        Follow up if symptoms worsen or fail to improve.      E-Visit Time Tracking:    Day 1 Time (in minutes): 5    Total Time (in minutes): 5

## 2024-07-29 ENCOUNTER — PATIENT MESSAGE (OUTPATIENT)
Dept: OTOLARYNGOLOGY | Facility: CLINIC | Age: 29
End: 2024-07-29
Payer: COMMERCIAL

## 2024-07-30 ENCOUNTER — E-VISIT (OUTPATIENT)
Dept: FAMILY MEDICINE | Facility: CLINIC | Age: 29
End: 2024-07-30
Payer: COMMERCIAL

## 2024-07-30 ENCOUNTER — OFFICE VISIT (OUTPATIENT)
Dept: OTOLARYNGOLOGY | Facility: CLINIC | Age: 29
End: 2024-07-30
Payer: COMMERCIAL

## 2024-07-30 VITALS — WEIGHT: 288.13 LBS | BODY MASS INDEX: 47.95 KG/M2

## 2024-07-30 DIAGNOSIS — J06.9 UPPER RESPIRATORY TRACT INFECTION, UNSPECIFIED TYPE: Primary | ICD-10-CM

## 2024-07-30 DIAGNOSIS — J02.9 SORE THROAT: Primary | ICD-10-CM

## 2024-07-30 PROCEDURE — 3008F BODY MASS INDEX DOCD: CPT | Mod: CPTII,S$GLB,, | Performed by: NURSE PRACTITIONER

## 2024-07-30 PROCEDURE — 3061F NEG MICROALBUMINURIA REV: CPT | Mod: CPTII,S$GLB,, | Performed by: NURSE PRACTITIONER

## 2024-07-30 PROCEDURE — 3066F NEPHROPATHY DOC TX: CPT | Mod: CPTII,S$GLB,, | Performed by: NURSE PRACTITIONER

## 2024-07-30 PROCEDURE — 4010F ACE/ARB THERAPY RXD/TAKEN: CPT | Mod: CPTII,S$GLB,, | Performed by: NURSE PRACTITIONER

## 2024-07-30 PROCEDURE — 99999 PR PBB SHADOW E&M-EST. PATIENT-LVL III: CPT | Mod: PBBFAC,,, | Performed by: NURSE PRACTITIONER

## 2024-07-30 PROCEDURE — 3051F HG A1C>EQUAL 7.0%<8.0%: CPT | Mod: CPTII,S$GLB,, | Performed by: NURSE PRACTITIONER

## 2024-07-30 PROCEDURE — 99213 OFFICE O/P EST LOW 20 MIN: CPT | Mod: S$GLB,,, | Performed by: NURSE PRACTITIONER

## 2024-07-30 NOTE — PROGRESS NOTES
Otolaryngology Clinic Note    Subjective:       Patient ID: Candida Barker is a 29 y.o. female.    Chief Complaint: Sore Throat (Positive strep) and Otalgia (Right ear/)      History of Present Illness: Candida Barker is a 29 y.o. female presenting with sore throat and ear pain.    She started with a sore throat again 4-5 days ago. It is overall improving. She tested herself yesterday at work and tested POS for strep. She denies any runny nose, congestion, nausea, or vomiting. She has a sore throat, headache, and ear pain that started 4-5 days ago. She states the ear pain has been there since her visit with Dr. Kaplan. She denies any sick contacts.     Per last visit with Dr. Kaplan:   Here today for throat concerns.  She reports 4 episodes of strep in the past 2 months. 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   Symptoms include sore/painful throat, ulcerations of throat, dysphagia, occ fever, no nausea / emesis.   Prior to that she does not have a  history of strep throat other than maybe a few times.  Currently throat feels fine - back to baseline. Finishing Amoxil tonight.    No family history of bleeding disorders.    Also with right otalgia for 3 months - she reports a chronic pain inside the ear as well as a specific spot that is painful. She has used Ciprodex drops as well as the systemic abx from strep.    Past Surgical History:   Procedure Laterality Date    CHOLECYSTECTOMY  12/2023     No past medical history on file.  Social Determinants of Health     Tobacco Use: Low Risk  (7/30/2024)    Patient History     Smoking Tobacco Use: Never     Smokeless Tobacco Use: Never     Passive Exposure: Not on file   Alcohol Use: Not At Risk (3/18/2024)    AUDIT-C     Frequency of Alcohol Consumption: Monthly or less     Average Number of Drinks: 1 or 2     Frequency of Binge Drinking: Never   Financial Resource Strain: Low Risk  (3/18/2024)    Overall Financial Resource Strain (CARDIA)     Difficulty of  Paying Living Expenses: Not very hard   Food Insecurity: No Food Insecurity (3/18/2024)    Hunger Vital Sign     Worried About Running Out of Food in the Last Year: Never true     Ran Out of Food in the Last Year: Never true   Transportation Needs: No Transportation Needs (3/18/2024)    PRAPARE - Transportation     Lack of Transportation (Medical): No     Lack of Transportation (Non-Medical): No   Physical Activity: Sufficiently Active (3/18/2024)    Exercise Vital Sign     Days of Exercise per Week: 4 days     Minutes of Exercise per Session: 40 min   Stress: Stress Concern Present (3/18/2024)    [employer] [place] [employer] - Occupational Stress Questionnaire     Feeling of Stress : To some extent   Housing Stability: Low Risk  (3/18/2024)    Housing Stability Vital Sign     Unable to Pay for Housing in the Last Year: No     Number of Places Lived in the Last Year: 1     Unstable Housing in the Last Year: No   Depression: Not at risk (4/20/2024)    Received from [facility]    PHQ-2     PHQ-2 Score: 0   Utilities: Not on file   Health Literacy: Not on file   Social Isolation: Not on file     Review of patient's allergies indicates:  No Known Allergies  Current Outpatient Medications   Medication Instructions    albuterol (PROVENTIL/VENTOLIN HFA) 90 mcg/actuation inhaler 2 puffs, Inhalation, Every 6 hours PRN    ALPRAZolam (XANAX) 0.5 mg, Oral, Nightly PRN    amoxicillin (AMOXIL) 500 mg, Oral, Every 12 hours, for ten days    aspirin (ECOTRIN) 81 MG EC tablet     blood-glucose meter,continuous (DEXCOM ) Misc Use as directed    blood-glucose sensor (DEXCOM G6 SENSOR) Ernestine Apply and change every 10 days    blood-glucose transmitter (DEXCOM G6 TRANSMITTER) Ernestine Use as directed every 90 days    carvediloL (COREG) 12.5 mg, Oral, 2 times daily    enalapril (VASOTEC) 20 mg, Oral, 2 times daily    FLUoxetine 40 mg, Oral, Daily    fluticasone propionate (FLONASE) 50 mcg/actuation nasal spray  "Use 2 sprays to each nostril daily    gabapentin (NEURONTIN) 300 mg, Oral, Nightly    hydroCHLOROthiazide (HYDRODIURIL) 12.5 mg, Oral, Every morning    hydrOXYzine pamoate (VISTARIL) 25-50 mg, Oral, Daily PRN    insulin lispro (HUMALOG KWIKPEN INSULIN) 100 unit/mL pen Inject 5 units before breakfast, 12 units before lunch, 22 units before dinner (39 units per day)    Lactobacillus rhamnosus GG (CULTURELLE) 10 billion cell capsule 1 capsule, Oral, Daily    leflunomide (ARAVA) 20 mg, Oral, Daily    levocetirizine (XYZAL) 5 mg, Oral, Nightly    levothyroxine (SYNTHROID) 200 mcg, Oral, Before breakfast    levothyroxine (SYNTHROID) 50 mcg, Oral, Before breakfast    metFORMIN (GLUCOPHAGE-XR) 1,000 mg, Oral, 2 times daily with meals    methocarbamoL (ROBAXIN) 750 mg, Oral, 4 times daily    MOUNJARO 10 mg, Subcutaneous, Every 7 days    mupirocin (BACTROBAN) 2 % ointment Apply pea sized amount to inside of the right ear twice daily for 10 days    ondansetron (ZOFRAN-ODT) 4 mg, Oral, Every 8 hours PRN    OTEZLA 30 mg, Oral, 2 times daily    pen needle, diabetic 32 gauge x 5/32" Ndle 4 injections daily    rosuvastatin (CRESTOR) 5 mg, Oral, Daily    tirzepatide 12.5 mg, Subcutaneous, Every 7 days    TOUJEO MAX U-300 SOLOSTAR 60 Units, Subcutaneous, Daily         ENT ROS negative except as stated above.     Patient answers are not available for this visit.            Objective:      There were no vitals filed for this visit.    General: NAD, well appearing  Eyes: Normal conjunctiva and lids  Face: symmetric, nerve intact  Nose: The nose is without any evidence of any deformity. The nasal mucosa is moist, crusting noted bilaterally. The septum is midline. There is no evidence of septal hematoma. The turbinates are without abnormality.   Ears: The ears are with normal-appearing pinna. Examination of the canals is normal appearing bilaterally. There is no drainage or erythema noted. The tympanic membranes are normal appearing with " pearly color, normal-appearing landmarks and normal light reflex. Hearing is grossly intact.  Mouth: No obvious abnormalities to the lips. The teeth are unremarkable. The gingivae are without any obvious evidence of infection or lesion. The oral mucosa is moist and pink. There are no obvious masses to the hard or soft palate.   Oropharynx: The uvula is midline.  The tongue is midline. The posterior pharynx is without erythema or exudate. The tonsils are normal appearing, 1+ bilaterally with no erythema or exudate.  Salivary glands: The salivary glands are symmetric and not enlarged, no masses  Neck: No lymphadenopathy, trachea midline, thryoid not enlarged.  Psych: Normal mood and affect.   Neuro: Grossly intact  Speech: fluent       Assessment and Plan:       1. Sore throat        - Can covid swab with at home test  - OTC medication for symptom control; tylenol/motrin for pain, warm salt water gargles  - Call back if symptoms worsening or do not resolve or starts with fever/exudate on tonsils, erythema     RTC: in a few weeks when feeling better for strep swab to test for carrier     Plan of care was discussed in detail with the patient, who agreed with the plan as above. All questions were answered in detail.     JHONATAN BecerrilP-C  Otolaryngology     Reviewed with Dr. Kaplan and agrees with plan

## 2024-07-30 NOTE — PROGRESS NOTES
Patient ID: Candida Barker is a 29 y.o. female.    Chief Complaint: URI (Entered automatically based on patient selection in [email or web address].)    The patient initiated a request through [email or web address] on 7/30/2024 for evaluation and management with a chief complaint of URI (Entered automatically based on patient selection in [email or web address].)     I evaluated the questionnaire submission on 7/30/24.    [facility] E-Visit Covid    7/30/2024  2:05 PM CDT - Filed by Patient   Do you agree to participate in an E-Visit? Yes   If you have any of the following symptoms, go to your local emergency room or call 911: I acknowledge   Are you pregnant, could you be pregnant, or are you breast feeding? None of the above   What is the main issue you would like addressed today? Sorr throat, ear ache   Do you think you might have COVID or the Flu? Yes COVID   Have you tested positive for COVID or Flu? No   What symptoms do you currently have?  Headache;  Sore throat;  Ear pain   Have you had any of the following? None of the above   Have you ever smoked? I have never smoked   Have you had a fever? No   When did your symptoms first appear? 7/26/2024   In the last two weeks, have you been in close contact with someone who has COVID-19 or the Flu? No   List what you have done or taken to help your symptoms. Nothing   How severe are your symptoms? Moderate   Have your symptoms gotten better or worse since they started?  No change   Do you have transportation to get testing if it is needed and ordered for you at an [facility] location? Yes   Provide any additional information you feel is important.    Please attach any relevant images or files    Are you able to take your vital signs? Yes   Systolic Blood Pressure: 121   Diastolic Blood Pressure: 77   Weight: 287   Height: 65   Pulse: 92   Temperature:    Respiration rate:    Pulse Oxygen: 99          Active Problem List with Overview Notes    Diagnosis Date Noted    Acute pain of right shoulder 06/20/2024     acute  onset  - prn Robaxin  - prn tylenol, heat/ice therapy, stretches   Prn rtc         Plaque psoriasis 05/09/2024    KEIRA (obstructive sleep apnea) 01/09/2024      Home sleep study      Morbid obesity 05/17/2018     Wt Readings from Last 3 Encounters:   03/19/24 1249 131.5 kg (290 lb)       Diabetes Medications               insulin lispro (HUMALOG U-100 INSULIN) 100 unit/mL Crtg Inject 90 Units into the skin 3 (three) times daily. 15 am, 20 lunch, 25 dinner    metFORMIN (GLUCOPHAGE-XR) 500 MG ER 24hr tablet Take 1,000 mg by mouth 2 (two) times daily with meals.    MOUNJARO 7.5 mg/0.5 mL PnIj Inject 7.5 mg into the skin every 7 days.    TOUJEO MAX U-300 SOLOSTAR 300 unit/mL (3 mL) insulin pen Inject 70 Units into the skin once daily.            General weight loss/lifestyle modification strategies discussed: limit sugary drinks, exercise 3-5x per week  Informal exercise measures discussed, e.g. taking stairs instead of elevator.              Essential hypertension 04/21/2017     -at goal today  - Current Hypertension Medications:   Hypertension Medications               carvediloL (COREG) 12.5 MG tablet Take 1 tablet (12.5 mg total) by mouth 2 (two) times daily.    enalapril (VASOTEC) 20 MG tablet Take 20 mg by mouth 2 (two) times daily.    hydroCHLOROthiazide (HYDRODIURIL) 12.5 MG Tab Take 1 tablet (12.5 mg total) by mouth every morning.          -continue lifestyle modification with low sodium diet and exercise   -discussed hypertension disease course and importance of treating high blood pressure  -patient understood and advised of risk of untreated blood pressure.  ER precautions were given   for symptoms of hypertensive urgency and emergency.        Hyperlipidemia 04/21/2017     -chronic condition. Currently stable.      Hyperlipidemia Medications               rosuvastatin (CRESTOR) 5 MG tablet Take 1 tablet (5 mg total) by mouth once daily.              Lab Results   Component Value Date    CHOL 231 (H)  03/20/2024    CHOL 182 05/16/2023    CHOL 201 (H) 02/17/2023     Lab Results   Component Value Date    HDL 43 03/20/2024    HDL 35 05/16/2023    HDL 40 02/17/2023     Lab Results   Component Value Date    LDLCALC 150.8 03/20/2024    LDLCALC 125 (H) 05/16/2023    LDLCALC 139 (H) 02/17/2023     Lab Results   Component Value Date    TRIG 186 (H) 03/20/2024    TRIG 110 05/16/2023    TRIG 109 02/17/2023     Lab Results   Component Value Date    CHOLHDL 18.6 (L) 03/20/2024    CHOLHDL 5.2 (H) 05/16/2023    CHOLHDL 5.0 (H) 02/17/2023          The ASCVD Risk score (Gladis BRANHAM, et al., 2019) failed to calculate for the following reasons:    The 2019 ASCVD risk score is only valid for ages 40 to 79        Postablative hypothyroidism 04/21/2017     S/p ablation  Chronic hx; stable on synthroid  - denies symptoms   Lab Results   Component Value Date    TSH 0.904 03/20/2024       - cont current dose as rxd      Type 2 diabetes mellitus with hyperglycemia, with long-term current use of insulin 04/21/2017     uncontrolled   Had been off meds for few months, restart recheck A1c 3m  Lab Results   Component Value Date    HGBA1C 9.4 (H) 03/20/2024     Hypoglycemic Events: none     -current meds:   Diabetes Medications               insulin lispro (HUMALOG U-100 INSULIN) 100 unit/mL Crtg Inject 90 Units into the skin 3 (three) times daily. 15 am, 20 lunch, 25 dinner    metFORMIN (GLUCOPHAGE-XR) 500 MG ER 24hr tablet Take 1,000 mg by mouth 2 (two) times daily with meals.    MOUNJARO 7.5 mg/0.5 mL PnIj Inject 7.5 mg into the skin every 7 days.    TOUJEO MAX U-300 SOLOSTAR 300 unit/mL (3 mL) insulin pen Inject 70 Units into the skin once daily.          -on statin:   Hyperlipidemia Medications               rosuvastatin (CRESTOR) 5 MG tablet Take 1 tablet (5 mg total) by mouth once daily.          -on ACE-I/ARB:   Hypertension Medications               carvediloL (COREG) 12.5 MG tablet Take 1 tablet (12.5 mg total) by mouth 2 (two) times  daily.    enalapril (VASOTEC) 20 MG tablet Take 20 mg by mouth 2 (two) times daily.    hydroCHLOROthiazide (HYDRODIURIL) 12.5 MG Tab Take 1 tablet (12.5 mg total) by mouth every morning.          -counseling provided on importance of diabetic diet and medication compliance in order to treat diabetes  -discussed diabetes disease course and potential complications  Follow up 3 months           Recent Labs Obtained:  No visits with results within 7 Day(s) from this visit.   Latest known visit with results is:   Office Visit on 07/02/2024   Component Date Value Ref Range Status    Cytology ThinPrep Pap Source 07/02/2024 Cervix   Final    Cytology ThinPrep Pap Report Status 07/02/2024 DNR   Final    Cytology Thinprep PAP Clinical His* 07/02/2024 Routine exam   Corrected    Cytology ThinPrep Pap LMP 07/02/2024 06/16/2024   Final    Cytology ThinPrep Previous PAP 07/02/2024 Unknown   Final    Cytology ThinPrep Previous Biopsy 07/02/2024 Unknown   Final    Cytology ThinPrep PAP Adequacy 07/02/2024 SEE BELOW   Final    Comment: Satisfactory for evaluation. Endocervical/transformation zone   component   present.      Cytology ThinPrep PAP General Lynn* 07/02/2024 DNR   Final    Cytology ThinPrep PAP Interpretati* 07/02/2024 SEE BELOW   Final    Cytology Results: Negative for intraepithelial lesion or malignancy.    Cytology ThinPrep PAP Comment 07/02/2024 SEE BELOW   Final    Comment: This case could not be evaluated with computer assisted technology.   The   slide was manually screened according to routine procedures.      Cytotechnologist 07/02/2024 SEE BELOW   Final    Comment: TYLER, CT(ASCP) CT screening location: [facility] [address], [facility],[address] CLIA: [id number]      Review Cytotechnologist 07/02/2024 DNR   Final    Pathologist 07/02/2024 DNR   Final    Cytology ThinPrep PAP Infection 07/02/2024 DNR   Final    Cytology Thin Prep Pap Explanation 07/02/2024 SEE BELOW   Final    Comment:  EXPLANATORY NOTE:     The Pap is a screening test for cervical cancer. It is   not a diagnostic test and is subject to false negative   and false positive results. It is most reliable when a   satisfactory sample, regularly obtained, is submitted   with relevant clinical findings and history, and when   the Pap result is evaluated along with historic and   current clinical information.    TEST PERFORMED AT:  [facility] [place] [address]  SAARH SOLANO MD         Encounter Diagnosis   Name Primary?    Upper respiratory tract infection, unspecified type Yes        Orders Placed This Encounter   Procedures    POCT COVID-19 Rapid Screening            E-Visit Time Tracking:

## 2024-08-01 DIAGNOSIS — Z79.4 TYPE 2 DIABETES MELLITUS WITH HYPERGLYCEMIA, WITH LONG-TERM CURRENT USE OF INSULIN: Primary | ICD-10-CM

## 2024-08-01 DIAGNOSIS — E11.65 TYPE 2 DIABETES MELLITUS WITH HYPERGLYCEMIA, WITH LONG-TERM CURRENT USE OF INSULIN: Primary | ICD-10-CM

## 2024-08-01 RX ORDER — PEN NEEDLE, DIABETIC 30 GX3/16"
NEEDLE, DISPOSABLE MISCELLANEOUS
Qty: 100 EACH | Refills: 11 | Status: SHIPPED | OUTPATIENT
Start: 2024-08-01

## 2024-08-01 NOTE — TELEPHONE ENCOUNTER
No care due was identified.  [facility] Embedded Care Due Messages. Reference number: [id number].   8/01/2024 2:29:02 PM CDT

## 2024-08-02 ENCOUNTER — OFFICE VISIT (OUTPATIENT)
Dept: OBSTETRICS AND GYNECOLOGY | Facility: CLINIC | Age: 29
End: 2024-08-02
Payer: COMMERCIAL

## 2024-08-02 VITALS
DIASTOLIC BLOOD PRESSURE: 78 MMHG | HEIGHT: 65 IN | SYSTOLIC BLOOD PRESSURE: 134 MMHG | WEIGHT: 287.5 LBS | BODY MASS INDEX: 47.9 KG/M2

## 2024-08-02 DIAGNOSIS — R10.2 PELVIC PAIN: Primary | ICD-10-CM

## 2024-08-02 PROCEDURE — 3078F DIAST BP <80 MM HG: CPT | Mod: CPTII,S$GLB,,

## 2024-08-02 PROCEDURE — 99999 PR PBB SHADOW E&M-EST. PATIENT-LVL V: CPT | Mod: PBBFAC,,,

## 2024-08-02 PROCEDURE — 3051F HG A1C>EQUAL 7.0%<8.0%: CPT | Mod: CPTII,S$GLB,,

## 2024-08-02 PROCEDURE — 1160F RVW MEDS BY RX/DR IN RCRD: CPT | Mod: CPTII,S$GLB,,

## 2024-08-02 PROCEDURE — 4010F ACE/ARB THERAPY RXD/TAKEN: CPT | Mod: CPTII,S$GLB,,

## 2024-08-02 PROCEDURE — 1159F MED LIST DOCD IN RCRD: CPT | Mod: CPTII,S$GLB,,

## 2024-08-02 PROCEDURE — 3066F NEPHROPATHY DOC TX: CPT | Mod: CPTII,S$GLB,,

## 2024-08-02 PROCEDURE — 3075F SYST BP GE 130 - 139MM HG: CPT | Mod: CPTII,S$GLB,,

## 2024-08-02 PROCEDURE — 99213 OFFICE O/P EST LOW 20 MIN: CPT | Mod: S$GLB,,,

## 2024-08-02 PROCEDURE — 3008F BODY MASS INDEX DOCD: CPT | Mod: CPTII,S$GLB,,

## 2024-08-02 PROCEDURE — 3061F NEG MICROALBUMINURIA REV: CPT | Mod: CPTII,S$GLB,,

## 2024-08-03 NOTE — PROGRESS NOTES
"Subjective     Patient ID: Candida Barker is a 29 y.o. female.    Chief Complaint:  Pelvic Pain      History of Present Illness  Pelvic Pain  The patient's primary symptoms include pelvic pain. The patient's pertinent negatives include no vaginal discharge. Pertinent negatives include no abdominal pain, back pain, chills, constipation, diarrhea, dysuria, fever, flank pain, frequency, headaches, hematuria, nausea, rash or vomiting.   28 y/o BF presents for evaluation of lower pelvic pain x 3 weeks. Initially started on the left side but is now experiencing on the right side. Describes as "dull and steady". No alleviating or aggravating factors. No change in urinary or bowel habits. Afebrile, no nausea/ vomiting. Denies hx of ovarian cysts, endometriosis or fibroids.     GYN & OB History  Patient's last menstrual period was 07/16/2024.   Date of Last Pap: 7/8/2024    OB History   No obstetric history on file.     Patient Active Problem List   Diagnosis    Essential hypertension    Hyperlipidemia    Morbid obesity    Postablative hypothyroidism    KEIRA (obstructive sleep apnea)    Type 2 diabetes mellitus with hyperglycemia, with long-term current use of insulin    Plaque psoriasis    Acute pain of right shoulder     History reviewed. No pertinent past medical history.    Past Surgical History:   Procedure Laterality Date    CHOLECYSTECTOMY  12/2023     Current Outpatient Medications on File Prior to Visit   Medication Sig Dispense Refill    ALPRAZolam (XANAX) 0.5 MG tablet Take 1 tablet (0.5 mg total) by mouth nightly as needed for Anxiety. 30 tablet 1    apremilast (OTEZLA) 30 mg Tab Take 1 tablet (30 mg total) by mouth 2 (two) times daily. 90 tablet 1    aspirin (ECOTRIN) 81 MG EC tablet       blood-glucose meter,continuous (DEXCOM ) Misc Use as directed 1 each 0    blood-glucose sensor (DEXCOM G6 SENSOR) Ernestine Apply and change every 10 days 3 each 11    blood-glucose transmitter (DEXCOM G6 TRANSMITTER) Ernestine Use " "as directed every 90 days 1 each 3    carvediloL (COREG) 12.5 MG tablet Take 1 tablet (12.5 mg total) by mouth 2 (two) times daily. 180 tablet 3    enalapril (VASOTEC) 20 MG tablet Take 1 tablet (20 mg total) by mouth 2 (two) times daily. 180 tablet 3    FLUoxetine 40 MG capsule Take 1 capsule (40 mg total) by mouth once daily. 90 capsule 3    fluticasone propionate (FLONASE) 50 mcg/actuation nasal spray Use 2 sprays to each nostril daily 16 g 0    hydroCHLOROthiazide (HYDRODIURIL) 12.5 MG Tab Take 1 tablet (12.5 mg total) by mouth every morning. 90 tablet 3    hydrOXYzine pamoate (VISTARIL) 25 MG Cap Take 1-2 capsules (25-50 mg total) by mouth daily as needed (insomnia). 90 capsule 1    insulin lispro (HUMALOG KWIKPEN INSULIN) 100 unit/mL pen Inject 5 units before breakfast, 12 units before lunch, 22 units before dinner (39 units per day) 45 mL 0    Lactobacillus rhamnosus GG (CULTURELLE) 10 billion cell capsule Take 1 capsule by mouth once daily.      levocetirizine (XYZAL) 5 MG tablet Take 5 mg by mouth every evening.      levothyroxine (SYNTHROID) 200 MCG tablet Take 1 tablet (200 mcg total) by mouth before breakfast. 90 tablet 3    levothyroxine (SYNTHROID) 50 MCG tablet Take 1 tablet (50 mcg total) by mouth before breakfast. 90 tablet 3    metFORMIN (GLUCOPHAGE-XR) 500 MG ER 24hr tablet Take 1,000 mg by mouth 2 (two) times daily with meals.      mupirocin (BACTROBAN) 2 % ointment Apply pea sized amount to inside of the right ear twice daily for 10 days 22 g 1    ondansetron (ZOFRAN-ODT) 4 MG TbDL Take 1 tablet (4 mg total) by mouth every 8 (eight) hours as needed (nausea/vomiting). 12 tablet 0    pen needle, diabetic 32 gauge x 5/32" Ndle TO BE USE WITH INSULIN  FOUR TIMES DAILY 100 each 11    rosuvastatin (CRESTOR) 5 MG tablet Take 1 tablet (5 mg total) by mouth once daily. 90 tablet 3    tirzepatide 10 mg/0.5 mL PnIj Inject 10 mg into the skin every 7 days. 4 Pen 2    tirzepatide 12.5 mg/0.5 mL Viet Inject " 12.5 mg into the skin every 7 days. 4 Pen 2    TOUJEO MAX U-300 SOLOSTAR 300 unit/mL (3 mL) insulin pen Inject 60 Units into the skin once daily.      leflunomide (ARAVA) 20 MG Tab Take 20 mg by mouth once daily.       No current facility-administered medications on file prior to visit.       Review of Systems  Review of Systems   Constitutional:  Negative for chills and fever.   Eyes:  Negative for visual disturbance.   Respiratory:  Negative for shortness of breath.    Cardiovascular:  Negative for chest pain and palpitations.   Gastrointestinal:  Negative for abdominal pain, constipation, diarrhea, nausea and vomiting.   Genitourinary:  Positive for pelvic pain. Negative for dysuria, flank pain, frequency, hematuria, vaginal bleeding, vaginal discharge, vaginal pain, vaginal dryness and vaginal odor.   Musculoskeletal:  Negative for back pain.   Integumentary:  Negative for rash.   Neurological:  Negative for seizures, syncope and headaches.   Hematological:  Does not bruise/bleed easily.   Psychiatric/Behavioral:  Negative for depression. The patient is not nervous/anxious.         Objective   Physical Exam:   Constitutional: She is oriented to person, place, and time. She appears well-developed and well-nourished. No distress.    HENT:   Head: Normocephalic and atraumatic.   Nose: No epistaxis.    Eyes: Pupils are equal, round, and reactive to light. EOM are normal.      Pulmonary/Chest: Effort normal. No respiratory distress.        Abdominal: Soft. She exhibits no distension. There is no abdominal tenderness.             Musculoskeletal: Normal range of motion and moves all extremeties. No tenderness or edema.       Neurological: She is alert and oriented to person, place, and time.    Skin: Skin is warm and dry. No rash noted.    Psychiatric: She has a normal mood and affect. Her behavior is normal. Judgment normal.          Assessment and Plan     1. Pelvic pain        Plan:  - Discussed possible etiologies  of pelvic pain  - Recommend pelvic ultrasound  - Will contact with results and schedule follow up as needed    I spent a total of 20 minutes on the day of the visit. This includes face to face time and non-face to face time preparing to see the patient (eg, review of tests), obtaining and/or reviewing separately obtained history, documenting clinical information in the electronic or other health record, independently interpreting results and communicating results to the patient/family/caregiver, or care coordinator.

## 2024-08-06 ENCOUNTER — PATIENT MESSAGE (OUTPATIENT)
Dept: OBSTETRICS AND GYNECOLOGY | Facility: CLINIC | Age: 29
End: 2024-08-06
Payer: COMMERCIAL

## 2024-08-06 ENCOUNTER — HOSPITAL ENCOUNTER (OUTPATIENT)
Dept: RADIOLOGY | Facility: HOSPITAL | Age: 29
Discharge: HOME | End: 2024-08-06
Payer: COMMERCIAL

## 2024-08-06 DIAGNOSIS — R10.2 PELVIC PAIN: ICD-10-CM

## 2024-08-06 PROCEDURE — 76830 TRANSVAGINAL US NON-OB: CPT | Mod: TC,PO

## 2024-08-06 PROCEDURE — 76856 US EXAM PELVIC COMPLETE: CPT | Mod: 26,,, | Performed by: RADIOLOGY

## 2024-08-06 PROCEDURE — 76856 US EXAM PELVIC COMPLETE: CPT | Mod: TC,PO

## 2024-08-06 PROCEDURE — 76830 TRANSVAGINAL US NON-OB: CPT | Mod: 26,,, | Performed by: RADIOLOGY

## 2024-08-08 ENCOUNTER — PATIENT MESSAGE (OUTPATIENT)
Dept: OBSTETRICS AND GYNECOLOGY | Facility: CLINIC | Age: 29
End: 2024-08-08
Payer: COMMERCIAL

## 2024-08-08 ENCOUNTER — PATIENT MESSAGE (OUTPATIENT)
Dept: OTOLARYNGOLOGY | Facility: CLINIC | Age: 29
End: 2024-08-08
Payer: COMMERCIAL

## 2024-08-12 ENCOUNTER — OFFICE VISIT (OUTPATIENT)
Dept: OBSTETRICS AND GYNECOLOGY | Facility: CLINIC | Age: 29
End: 2024-08-12
Payer: COMMERCIAL

## 2024-08-12 ENCOUNTER — PATIENT MESSAGE (OUTPATIENT)
Dept: FAMILY MEDICINE | Facility: CLINIC | Age: 29
End: 2024-08-12

## 2024-08-12 ENCOUNTER — E-VISIT (OUTPATIENT)
Dept: FAMILY MEDICINE | Facility: CLINIC | Age: 29
End: 2024-08-12
Payer: COMMERCIAL

## 2024-08-12 DIAGNOSIS — R11.0 NAUSEA: ICD-10-CM

## 2024-08-12 DIAGNOSIS — A08.4 VIRAL GASTROENTERITIS: Primary | ICD-10-CM

## 2024-08-12 DIAGNOSIS — E28.2 POLYCYSTIC OVARIES: Primary | ICD-10-CM

## 2024-08-12 DIAGNOSIS — R19.7 DIARRHEA, UNSPECIFIED TYPE: ICD-10-CM

## 2024-08-12 PROCEDURE — 3066F NEPHROPATHY DOC TX: CPT | Mod: CPTII,95,,

## 2024-08-12 PROCEDURE — 3051F HG A1C>EQUAL 7.0%<8.0%: CPT | Mod: CPTII,95,,

## 2024-08-12 PROCEDURE — 4010F ACE/ARB THERAPY RXD/TAKEN: CPT | Mod: CPTII,95,,

## 2024-08-12 PROCEDURE — 1159F MED LIST DOCD IN RCRD: CPT | Mod: CPTII,95,,

## 2024-08-12 PROCEDURE — 1160F RVW MEDS BY RX/DR IN RCRD: CPT | Mod: CPTII,95,,

## 2024-08-12 PROCEDURE — 99212 OFFICE O/P EST SF 10 MIN: CPT | Mod: 95,,,

## 2024-08-12 PROCEDURE — 3061F NEG MICROALBUMINURIA REV: CPT | Mod: CPTII,95,,

## 2024-08-12 RX ORDER — ONDANSETRON 4 MG/1
4 TABLET, ORALLY DISINTEGRATING ORAL EVERY 8 HOURS PRN
Qty: 30 TABLET | Refills: 0 | Status: SHIPPED | OUTPATIENT
Start: 2024-08-12

## 2024-08-12 NOTE — PROGRESS NOTES
Patient ID: Candida Barker is a 29 y.o. female.    Chief Complaint: General Illness (Entered automatically based on patient selection in [employer].)    The patient initiated a request through [employer] on 8/12/2024 for evaluation and management with a chief complaint of General Illness (Entered automatically based on patient selection in [employer].)     I evaluated the questionnaire submission on 8/12/24.    [facility] Evisit [facility]-Common Problems    8/12/2024  8:30 AM CDT - Filed by Patient   What do you need help with? Nausea/Vomiting/Diarrhea   Do you agree to participate in an E-Visit? Yes   If you have any of the following symptoms, please present to your local emergency room or call 911:  I acknowledge   Are you pregnant, could you be pregnant, or are you breast feeding? None of the above   What is the main issue you would like addressed today? Nausea/vomiting   Do you have diarrhea? No   Are you vomiting? Yes, I am vomiting frequently   Are you able to keep down fluids? Yes, I can keep down some fluids.   Do you have belly pain? I have a little pain or no pain   Are you feeling dizzy or like you might pass out? No   When did your symptoms begin? 8/11/2024   Do you have a fever? No, I do not have a fever   Are you having trouble walking or lifting yourself due to weakness from this illness?  No   Do any of the following apply to you? My urine is normal   Did your condition begin after a specific meal that may have caused the illness? Yes, after seven to twelve hours   Please enter some information about your meal, including what you ate that may have caused your illness. [facility]    Have you taken antibiotics in the last two weeks? Not sure   Have you been hospitalized in the past 2 months? No, I have not been hospitalized recently.   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 No   Have you had a meal consisting of raw meat or fish in the week  prior to your illness? No   Have you recently travelled to a place where you may have caught an illness? No   Have you tried any medication or other treatment for your symptoms? Yes   Please list the treatments you tried, and the result of those treatments. Pepto   Provide any additional information you feel is important.    Please attach any relevant images or files    Are you able to take your vital signs? No          Active Problem List with Overview Notes    Diagnosis Date Noted    Acute pain of right shoulder 06/20/2024     acute onset  - prn Robaxin  - prn tylenol, heat/ice therapy, stretches   Prn rtc         Plaque psoriasis 05/09/2024    KEIRA (obstructive sleep apnea) 01/09/2024      Home sleep study      Morbid obesity 05/17/2018     Wt Readings from Last 3 Encounters:   03/19/24 1249 131.5 kg (290 lb)       Diabetes Medications               insulin lispro (HUMALOG U-100 INSULIN) 100 unit/mL Crtg Inject 90 Units into the skin 3 (three) times daily. 15 am, 20 lunch, 25 dinner    metFORMIN (GLUCOPHAGE-XR) 500 MG ER 24hr tablet Take 1,000 mg by mouth 2 (two) times daily with meals.    MOUNJARO 7.5 mg/0.5 mL PnIj Inject 7.5 mg into the skin every 7 days.    TOUJEO MAX U-300 SOLOSTAR 300 unit/mL (3 mL) insulin pen Inject 70 Units into the skin once daily.            General weight loss/lifestyle modification strategies discussed: limit sugary drinks, exercise 3-5x per week  Informal exercise measures discussed, e.g. taking stairs instead of elevator.              Essential hypertension 04/21/2017     -at goal today  - Current Hypertension Medications:   Hypertension Medications               carvediloL (COREG) 12.5 MG tablet Take 1 tablet (12.5 mg total) by mouth 2 (two) times daily.    enalapril (VASOTEC) 20 MG tablet Take 20 mg by mouth 2 (two) times daily.    hydroCHLOROthiazide (HYDRODIURIL) 12.5 MG Tab Take 1 tablet (12.5 mg total) by mouth every morning.          -continue lifestyle modification with low  sodium diet and exercise   -discussed hypertension disease course and importance of treating high blood pressure  -patient understood and advised of risk of untreated blood pressure.  ER precautions were given   for symptoms of hypertensive urgency and emergency.        Hyperlipidemia 04/21/2017     -chronic condition. Currently stable.      Hyperlipidemia Medications               rosuvastatin (CRESTOR) 5 MG tablet Take 1 tablet (5 mg total) by mouth once daily.              Lab Results   Component Value Date    CHOL 231 (H) 03/20/2024    CHOL 182 05/16/2023    CHOL 201 (H) 02/17/2023     Lab Results   Component Value Date    HDL 43 03/20/2024    HDL 35 05/16/2023    HDL 40 02/17/2023     Lab Results   Component Value Date    LDLCALC 150.8 03/20/2024    LDLCALC 125 (H) 05/16/2023    LDLCALC 139 (H) 02/17/2023     Lab Results   Component Value Date    TRIG 186 (H) 03/20/2024    TRIG 110 05/16/2023    TRIG 109 02/17/2023     Lab Results   Component Value Date    CHOLHDL 18.6 (L) 03/20/2024    CHOLHDL 5.2 (H) 05/16/2023    CHOLHDL 5.0 (H) 02/17/2023          The ASCVD Risk score (Gladis BRANHAM, et al., 2019) failed to calculate for the following reasons:    The 2019 ASCVD risk score is only valid for ages 40 to 79        Postablative hypothyroidism 04/21/2017     S/p ablation  Chronic hx; stable on synthroid  - denies symptoms   Lab Results   Component Value Date    TSH 0.904 03/20/2024       - cont current dose as rxd      Type 2 diabetes mellitus with hyperglycemia, with long-term current use of insulin 04/21/2017     uncontrolled   Had been off meds for few months, restart recheck A1c 3m  Lab Results   Component Value Date    HGBA1C 9.4 (H) 03/20/2024     Hypoglycemic Events: none     -current meds:   Diabetes Medications               insulin lispro (HUMALOG U-100 INSULIN) 100 unit/mL Crtg Inject 90 Units into the skin 3 (three) times daily. 15 am, 20 lunch, 25 dinner    metFORMIN (GLUCOPHAGE-XR) 500 MG ER 24hr tablet  Take 1,000 mg by mouth 2 (two) times daily with meals.    MOUNJARO 7.5 mg/0.5 mL PnIj Inject 7.5 mg into the skin every 7 days.    TOUJEO MAX U-300 SOLOSTAR 300 unit/mL (3 mL) insulin pen Inject 70 Units into the skin once daily.          -on statin:   Hyperlipidemia Medications               rosuvastatin (CRESTOR) 5 MG tablet Take 1 tablet (5 mg total) by mouth once daily.          -on ACE-I/ARB:   Hypertension Medications               carvediloL (COREG) 12.5 MG tablet Take 1 tablet (12.5 mg total) by mouth 2 (two) times daily.    enalapril (VASOTEC) 20 MG tablet Take 20 mg by mouth 2 (two) times daily.    hydroCHLOROthiazide (HYDRODIURIL) 12.5 MG Tab Take 1 tablet (12.5 mg total) by mouth every morning.          -counseling provided on importance of diabetic diet and medication compliance in order to treat diabetes  -discussed diabetes disease course and potential complications  Follow up 3 months           Recent Labs Obtained:  No visits with results within 7 Day(s) from this visit.   Latest known visit with results is:   Office Visit on 07/02/2024   Component Date Value Ref Range Status    Cytology ThinPrep Pap Source 07/02/2024 Cervix   Final    Cytology ThinPrep Pap Report Status 07/02/2024 DNR   Final    Cytology Thinprep PAP Clinical His* 07/02/2024 Routine exam   Corrected    Cytology ThinPrep Pap LMP 07/02/2024 06/16/2024   Final    Cytology ThinPrep Previous PAP 07/02/2024 Unknown   Final    Cytology ThinPrep Previous Biopsy 07/02/2024 Unknown   Final    Cytology ThinPrep PAP Adequacy 07/02/2024 SEE BELOW   Final    Comment: Satisfactory for evaluation. Endocervical/transformation zone   component   present.      Cytology ThinPrep PAP General [place]* 07/02/2024 DNR   Final    Cytology ThinPrep PAP Interpretati* 07/02/2024 SEE BELOW   Final    Cytology Results: Negative for intraepithelial lesion or malignancy.    Cytology ThinPrep PAP Comment 07/02/2024 SEE BELOW   Final    Comment: This case could not be  evaluated with computer assisted technology.   The   slide was manually screened according to routine procedures.      Cytotechnologist 07/02/2024 SEE BELOW   Final    Comment: [facility], CT(ASCP) CT screening location: [address], [place],[address] CLIA: [id number]      Review Cytotechnologist 07/02/2024 DNR   Final    Pathologist 07/02/2024 DNR   Final    Cytology ThinPrep PAP Infection 07/02/2024 DNR   Final    Cytology Thin Prep Pap Explanation 07/02/2024 SEE BELOW   Final    Comment: EXPLANATORY NOTE:     The Pap is a screening test for cervical cancer. It is   not a diagnostic test and is subject to false negative   and false positive results. It is most reliable when a   satisfactory sample, regularly obtained, is submitted   with relevant clinical findings and history, and when   the Pap result is evaluated along with historic and   current clinical information.    TEST PERFORMED AT:  [address] [place] [address]  SARAH SOLANO MD         Encounter Diagnoses   Name Primary?    Nausea     Viral gastroenteritis Yes    Diarrhea, unspecified type         No orders of the defined types were placed in this encounter.     Medications Ordered This Encounter   Medications    ondansetron (ZOFRAN-ODT) 4 MG TbDL     Sig: Take 1 tablet (4 mg total) by mouth every 8 (eight) hours as needed (nausea/vomiting).     Dispense:  30 tablet     Refill:  0        E-Visit Time Tracking:

## 2024-08-12 NOTE — PROGRESS NOTES
The patient location is: home in [place]  The chief complaint leading to consultation is: f/u on pelvic ultrasound    Visit type: audiovisual    Face to Face time with patient: 8  15 minutes of total time spent on the encounter, which includes face to face time and non-face to face time preparing to see the patient (eg, review of tests), Obtaining and/or reviewing separately obtained history, Documenting clinical information in the electronic or other health record, Independently interpreting results (not separately reported) and communicating results to the patient/family/caregiver, or Care coordination (not separately reported).         Each patient to whom he or she provides medical services by telemedicine is:  (1) informed of the relationship between the physician and patient and the respective role of any other health care provider with respect to management of the patient; and (2) notified that he or she may decline to receive medical services by telemedicine and may withdraw from such care at any time.    Notes:       Subjective     Patient ID: Candida Barker is a 29 y.o. female.    Chief Complaint:  No chief complaint on file.      History of Present Illness  HPI  30 y/o WF presents to discuss recent ultrasound findings. Ordered for evaluation of pelvic pain. She reports discomfort is mildly alleviated now. Having regular monthly cycles. First two days are heavy, requiring hourly tampon changes. No anemia on CBC 4 months ago. Pelvic ultrasound results are as below:    US Pelvis Comp with Transvag NON-OB (xpd  Order: [id number]  Status: Final result       Visible to patient: Yes (seen)       Next appt: 08/16/2024 at 07:40 AM in Orthopedics (Livan Mendez MD)       Dx: Pelvic pain    3 Result Notes  Details    Reading Physician Reading Date Result Priority   Leslie Du MD  [phone] 8/6/2024 Routine     Narrative & Impression  EXAMINATION:  US PELVIS COMP WITH TRANSVAG NON-OB (XPD)      CLINICAL HISTORY:  Pelvic and perineal pain     TECHNIQUE:  Transabdominal sonography of the pelvis was performed, followed by transvaginal sonography to better evaluate the uterus and ovaries.     COMPARISON:  None.     FINDINGS:  Uterus: The uterus measures 7.3 x 3.7 x 5.2 cm in dimension.  No uterine masses appreciated.  There is a normal homogeneous uterine parenchymal echotexture.  The endometrial stripe measures 7 mm, normal for a premenopausal patient.  No fluid/blood products within the endometrial canal.     Ovaries: The right ovary measures 4.1 x 2.7 x 3.4 cm in dimension (total volume = 19.2 cc) and the left ovary measures 3.4 x 1.9 x 2.5 cm in dimension (total volume = 8.6 cc).  There are multiple peripherally situated simple follicles present in both ovaries.  There is a 24 x 18 x 21 mm simple cyst/follicle present within the right ovary.  No solid ovarian masses appreciated.  There is normal arterial and venous color flow present in both ovaries.     Adnexal soft tissues: No free fluid noted in the cul de sac.  No solid or cystic adnexal masses.     Impression:     1. Sonographic findings which could relate to PCOS.  Please correlate clinically.  2. 24 mm simple right ovarian follicle/cyst.        Electronically signed by:Trever Du MD  Date:                                            2024  Time:                                           15:50     GYN & OB History  Patient's last menstrual period was 2024.   Date of Last Pap: 2024    OB History    Para Term  AB Living   0 0 0 0 0 0   SAB IAB Ectopic Multiple Live Births   0 0 0 0 0     Patient Active Problem List   Diagnosis    Essential hypertension    Hyperlipidemia    Morbid obesity    Postablative hypothyroidism    KEIRA (obstructive sleep apnea)    Type 2 diabetes mellitus with hyperglycemia, with long-term current use of insulin    Plaque psoriasis    Acute pain of right shoulder     Past Medical History:    Diagnosis Date    DM (diabetes mellitus), type 2     HTN (hypertension)     Hypothyroidism, unspecified     Mixed hyperlipidemia     Obesity, unspecified     Polycystic ovaries 2024     Past Surgical History:   Procedure Laterality Date    CHOLECYSTECTOMY  12/2023     Current Outpatient Medications on File Prior to Visit   Medication Sig Dispense Refill    ALPRAZolam (XANAX) 0.5 MG tablet Take 1 tablet (0.5 mg total) by mouth nightly as needed for Anxiety. 30 tablet 1    apremilast (OTEZLA) 30 mg Tab Take 1 tablet (30 mg total) by mouth 2 (two) times daily. 90 tablet 1    aspirin (ECOTRIN) 81 MG EC tablet       blood-glucose meter,continuous (DEXCOM ) Misc Use as directed 1 each 0    blood-glucose sensor (DEXCOM G6 SENSOR) Ernestine Apply and change every 10 days 3 each 11    blood-glucose transmitter (DEXCOM G6 TRANSMITTER) Ernestine Use as directed every 90 days 1 each 3    carvediloL (COREG) 12.5 MG tablet Take 1 tablet (12.5 mg total) by mouth 2 (two) times daily. 180 tablet 3    enalapril (VASOTEC) 20 MG tablet Take 1 tablet (20 mg total) by mouth 2 (two) times daily. 180 tablet 3    FLUoxetine 40 MG capsule Take 1 capsule (40 mg total) by mouth once daily. 90 capsule 3    fluticasone propionate (FLONASE) 50 mcg/actuation nasal spray Use 2 sprays to each nostril daily 16 g 0    hydroCHLOROthiazide (HYDRODIURIL) 12.5 MG Tab Take 1 tablet (12.5 mg total) by mouth every morning. 90 tablet 3    hydrOXYzine pamoate (VISTARIL) 25 MG Cap Take 1-2 capsules (25-50 mg total) by mouth daily as needed (insomnia). 90 capsule 1    insulin lispro (HUMALOG KWIKPEN INSULIN) 100 unit/mL pen Inject 5 units before breakfast, 12 units before lunch, 22 units before dinner (39 units per day) 45 mL 0    Lactobacillus rhamnosus GG (CULTURELLE) 10 billion cell capsule Take 1 capsule by mouth once daily.      leflunomide (ARAVA) 20 MG Tab Take 20 mg by mouth once daily.      levocetirizine (XYZAL) 5 MG tablet Take 5 mg by mouth every  "evening.      levothyroxine (SYNTHROID) 200 MCG tablet Take 1 tablet (200 mcg total) by mouth before breakfast. 90 tablet 3    levothyroxine (SYNTHROID) 50 MCG tablet Take 1 tablet (50 mcg total) by mouth before breakfast. 90 tablet 3    metFORMIN (GLUCOPHAGE-XR) 500 MG ER 24hr tablet Take 1,000 mg by mouth 2 (two) times daily with meals.      mupirocin (BACTROBAN) 2 % ointment Apply pea sized amount to inside of the right ear twice daily for 10 days 22 g 1    pen needle, diabetic 32 gauge x 5/32" Ndle TO BE USE WITH INSULIN  FOUR TIMES DAILY 100 each 11    rosuvastatin (CRESTOR) 5 MG tablet Take 1 tablet (5 mg total) by mouth once daily. 90 tablet 3    tirzepatide 10 mg/0.5 mL PnIj Inject 10 mg into the skin every 7 days. 4 Pen 2    tirzepatide 12.5 mg/0.5 mL PnIj Inject 12.5 mg into the skin every 7 days. 4 Pen 2    TOUJEO MAX U-300 SOLOSTAR 300 unit/mL (3 mL) insulin pen Inject 60 Units into the skin once daily.      [DISCONTINUED] ondansetron (ZOFRAN-ODT) 4 MG TbDL Take 1 tablet (4 mg total) by mouth every 8 (eight) hours as needed (nausea/vomiting). 12 tablet 0     No current facility-administered medications on file prior to visit.       Review of Systems  Review of Systems   Constitutional:  Negative for chills and fever.   Eyes:  Negative for visual disturbance.   Respiratory:  Negative for shortness of breath.    Cardiovascular:  Negative for chest pain and palpitations.   Gastrointestinal:  Negative for abdominal pain, constipation, diarrhea, nausea and vomiting.   Genitourinary:  Negative for vaginal bleeding, vaginal discharge, vaginal pain, vaginal dryness and vaginal odor.   Musculoskeletal:  Negative for back pain.   Integumentary:  Negative for rash.   Neurological:  Negative for seizures, syncope and headaches.   Hematological:  Does not bruise/bleed easily.   Psychiatric/Behavioral:  Negative for depression. The patient is not nervous/anxious.           Objective   Physical Exam:   Constitutional: "   Deferred, tele visit                                       Assessment and Plan     1. Polycystic ovaries          Plan:  - Discussed ultrasound findings of polycystic ovaries and close relationship with DM  - She is already taking Metformin and insulin, continue as prescribed  - Discussed concern for hyperplasia with PCOS. However, she is having monthly cycles and does not desire to prevent pregnancy at this time  - Encouraged cycle tracking. If pelvic pain worsens, or cycles become irregular, she is to follow up  - All questions invited and answered    I spent a total of 15 minutes on the day of the visit. This includes face to face time and non-face to face time preparing to see the patient (eg, review of tests), obtaining and/or reviewing separately obtained history, documenting clinical information in the electronic or other health record, independently interpreting results and communicating results to the patient/family/caregiver, or care coordinator.

## 2024-08-19 DIAGNOSIS — L40.50 PSORIATIC ARTHRITIS: ICD-10-CM

## 2024-08-19 DIAGNOSIS — L40.9 PSORIASIS: ICD-10-CM

## 2024-08-20 RX ORDER — APREMILAST 30 MG/1
30 TABLET, FILM COATED ORAL 2 TIMES DAILY
Qty: 90 TABLET | Refills: 1 | Status: ACTIVE | OUTPATIENT
Start: 2024-08-20

## 2024-08-21 ENCOUNTER — OFFICE VISIT (OUTPATIENT)
Dept: PULMONOLOGY | Facility: CLINIC | Age: 29
End: 2024-08-21
Payer: COMMERCIAL

## 2024-08-21 VITALS
BODY MASS INDEX: 47.82 KG/M2 | DIASTOLIC BLOOD PRESSURE: 64 MMHG | WEIGHT: 287 LBS | HEART RATE: 80 BPM | SYSTOLIC BLOOD PRESSURE: 132 MMHG | HEIGHT: 65 IN

## 2024-08-21 DIAGNOSIS — E66.01 MORBID OBESITY: ICD-10-CM

## 2024-08-21 DIAGNOSIS — E11.65 TYPE 2 DIABETES MELLITUS WITH HYPERGLYCEMIA, WITH LONG-TERM CURRENT USE OF INSULIN: ICD-10-CM

## 2024-08-21 DIAGNOSIS — Z79.4 TYPE 2 DIABETES MELLITUS WITH HYPERGLYCEMIA, WITH LONG-TERM CURRENT USE OF INSULIN: ICD-10-CM

## 2024-08-21 DIAGNOSIS — I10 ESSENTIAL HYPERTENSION: Primary | ICD-10-CM

## 2024-08-21 DIAGNOSIS — G47.33 OSA (OBSTRUCTIVE SLEEP APNEA): ICD-10-CM

## 2024-08-21 PROCEDURE — 3075F SYST BP GE 130 - 139MM HG: CPT | Mod: CPTII,95,, | Performed by: INTERNAL MEDICINE

## 2024-08-21 PROCEDURE — 4010F ACE/ARB THERAPY RXD/TAKEN: CPT | Mod: CPTII,95,, | Performed by: INTERNAL MEDICINE

## 2024-08-21 PROCEDURE — 1159F MED LIST DOCD IN RCRD: CPT | Mod: CPTII,95,, | Performed by: INTERNAL MEDICINE

## 2024-08-21 PROCEDURE — 3078F DIAST BP <80 MM HG: CPT | Mod: CPTII,95,, | Performed by: INTERNAL MEDICINE

## 2024-08-21 PROCEDURE — 1160F RVW MEDS BY RX/DR IN RCRD: CPT | Mod: CPTII,95,, | Performed by: INTERNAL MEDICINE

## 2024-08-21 PROCEDURE — 3061F NEG MICROALBUMINURIA REV: CPT | Mod: CPTII,95,, | Performed by: INTERNAL MEDICINE

## 2024-08-21 PROCEDURE — 99214 OFFICE O/P EST MOD 30 MIN: CPT | Mod: 95,,, | Performed by: INTERNAL MEDICINE

## 2024-08-21 PROCEDURE — 3051F HG A1C>EQUAL 7.0%<8.0%: CPT | Mod: CPTII,95,, | Performed by: INTERNAL MEDICINE

## 2024-08-21 PROCEDURE — 3066F NEPHROPATHY DOC TX: CPT | Mod: CPTII,95,, | Performed by: INTERNAL MEDICINE

## 2024-08-21 PROCEDURE — 3008F BODY MASS INDEX DOCD: CPT | Mod: CPTII,95,, | Performed by: INTERNAL MEDICINE

## 2024-08-21 NOTE — ASSESSMENT & PLAN NOTE
8/21/2024     4:06 PM   EPWORTH SLEEPINESS SCALE   Sitting and reading 1   Watching TV 0   Sitting, inactive in a public place (e.g. a theatre or a meeting) 0   As a passenger in a car for an hour without a break 2   Lying down to rest in the afternoon when circumstances permit 2   Sitting and talking to someone 0   Sitting quietly after a lunch without alcohol 0   In a car, while stopped for a few minutes in traffic 0   Total score 5     Data 07/17/2024 to 08/15/2024  Usage > 4 hrs was 57%  APAP 5-12  AHI 1.6  Needs to improve adherence

## 2024-08-21 NOTE — PROGRESS NOTES
Otolaryngology Clinic Note    Subjective:       Patient ID: Candida Barker is a 29 y.o. female.    Chief Complaint: Follow-up (Strep swab test for carrier  )      History of Present Illness: Candida Barker is a 29 y.o. female presenting with sore throat and ear pain.    Per last visit with Dr. Kaplan:   Here today for throat concerns.  She reports 4 episodes of strep in the past 2 months. 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   Symptoms include sore/painful throat, ulcerations of throat, dysphagia, occ fever, no nausea / emesis.   Prior to that she does not have a  history of strep throat other than maybe a few times.  Currently throat feels fine - back to baseline. Finishing Amoxil tonight.    No family history of bleeding disorders.    Also with right otalgia for 3 months - she reports a chronic pain inside the ear as well as a specific spot that is painful. She has used Ciprodex drops as well as the systemic abx from strep.    Interval HPI: She started with a sore throat again 4-5 days ago. It is overall improving. She tested herself yesterday at work and tested POS for strep. She denies any runny nose, congestion, nausea, or vomiting. She has a sore throat, headache, and ear pain that started 4-5 days ago. She states the ear pain has been there since her visit with Dr. Kaplan. She denies any sick contacts.     8/23/24: She is here today for her strep swab to test for strep carrier. She denies any recent illness and does not have any symptoms today.     Past Surgical History:   Procedure Laterality Date    CHOLECYSTECTOMY  12/2023     Past Medical History:   Diagnosis Date    DM (diabetes mellitus), type 2     HTN (hypertension)     Hypothyroidism, unspecified     Mixed hyperlipidemia     Obesity, unspecified     Polycystic ovaries 2024     Social Determinants of Health     Tobacco Use: Low Risk  (8/21/2024)    Patient History     Smoking Tobacco Use: Never     Smokeless Tobacco Use: Never      Passive Exposure: Not on file   Alcohol Use: Not At Risk (3/18/2024)    AUDIT-C     Frequency of Alcohol Consumption: Monthly or less     Average Number of Drinks: 1 or 2     Frequency of Binge Drinking: Never   Financial Resource Strain: Low Risk  (3/18/2024)    Overall Financial Resource Strain (CARDIA)     Difficulty of Paying Living Expenses: Not very hard   Food Insecurity: No Food Insecurity (3/18/2024)    Hunger Vital Sign     Worried About Running Out of Food in the Last Year: Never true     Ran Out of Food in the Last Year: Never true   Transportation Needs: No Transportation Needs (3/18/2024)    PRAPARE - Transportation     Lack of Transportation (Medical): No     Lack of Transportation (Non-Medical): No   Physical Activity: Sufficiently Active (3/18/2024)    Exercise Vital Sign     Days of Exercise per Week: 4 days     Minutes of Exercise per Session: 40 min   Stress: Stress Concern Present (3/18/2024)    [employer] [employer] - Occupational Stress Questionnaire     Feeling of Stress : To some extent   Housing Stability: Low Risk  (3/18/2024)    Housing Stability Vital Sign     Unable to Pay for Housing in the Last Year: No     Number of Places Lived in the Last Year: 1     Unstable Housing in the Last Year: No   Depression: Not at risk (4/20/2024)    Received from [facility]    PHQ-2     PHQ-2 Score: 0   Utilities: Not on file   Health Literacy: Not on file   Social Isolation: Not on file     Review of patient's allergies indicates:  No Known Allergies  Current Outpatient Medications   Medication Instructions    ALPRAZolam (XANAX) 0.5 mg, Oral, Nightly PRN    aspirin (ECOTRIN) 81 MG EC tablet     blood-glucose meter,continuous (DEXCOM ) Misc Use as directed    blood-glucose sensor (DEXCOM G6 SENSOR) Ernestine Apply and change every 10 days    blood-glucose transmitter (DEXCOM G6 TRANSMITTER) Ernestine Use as directed every 90 days    carvediloL (COREG) 12.5 mg, Oral, 2  "times daily    enalapril (VASOTEC) 20 mg, Oral, 2 times daily    FLUoxetine 40 mg, Oral, Daily    fluticasone propionate (FLONASE) 50 mcg/actuation nasal spray Use 2 sprays to each nostril daily    hydroCHLOROthiazide (HYDRODIURIL) 12.5 mg, Oral, Every morning    hydrOXYzine pamoate (VISTARIL) 25-50 mg, Oral, Daily PRN    insulin lispro (HUMALOG KWIKPEN INSULIN) 100 unit/mL pen Inject 5 units before breakfast, 12 units before lunch, 22 units before dinner (39 units per day)    Lactobacillus rhamnosus GG (CULTURELLE) 10 billion cell capsule 1 capsule, Oral, Daily    levocetirizine (XYZAL) 5 mg, Oral, Nightly    levothyroxine (SYNTHROID) 200 mcg, Oral, Before breakfast    levothyroxine (SYNTHROID) 50 mcg, Oral, Before breakfast    metFORMIN (GLUCOPHAGE-XR) 1,000 mg, Oral, 2 times daily with meals    MOUNJARO 12.5 mg, Subcutaneous, Every 7 days    mupirocin (BACTROBAN) 2 % ointment Apply pea sized amount to inside of the right ear twice daily for 10 days    ondansetron (ZOFRAN-ODT) 4 mg, Oral, Every 8 hours PRN    OTEZLA 30 mg, Oral, 2 times daily    pen needle, diabetic 32 gauge x 5/32" Ndle TO BE USE WITH INSULIN  FOUR TIMES DAILY    rosuvastatin (CRESTOR) 5 mg, Oral, Daily    TOUJEO MAX U-300 SOLOSTAR 50 Units, Subcutaneous, Daily         ENT ROS negative except as stated above.     Patient answers are not available for this visit.            Objective:      There were no vitals filed for this visit.    General: NAD, well appearing  Eyes: Normal conjunctiva and lids  Face: symmetric, nerve intact  Nose: The nose is without any evidence of any deformity. The nasal mucosa is moist, crusting noted bilaterally. The septum is midline. There is no evidence of septal hematoma. The turbinates are without abnormality.   Ears: The ears are with normal-appearing pinna. Examination of the canals is normal appearing bilaterally. There is no drainage or erythema noted. The tympanic membranes are normal appearing with pearly " color, normal-appearing landmarks and normal light reflex. Hearing is grossly intact.  Mouth: No obvious abnormalities to the lips. The teeth are unremarkable. The gingivae are without any obvious evidence of infection or lesion. The oral mucosa is moist and pink. There are no obvious masses to the hard or soft palate.   Oropharynx: The uvula is midline.  The tongue is midline. The posterior pharynx is without erythema or exudate. The tonsils are normal appearing, 1+ bilaterally with no erythema or exudate.  Salivary glands: The salivary glands are symmetric and not enlarged, no masses  Neck: No lymphadenopathy, trachea midline, thryoid not enlarged.  Psych: Normal mood and affect.   Neuro: Grossly intact  Speech: fluent       Assessment and Plan:       1. Recurrent streptococcal pharyngitis          - Culture today to assess strep status    RTC: will call with result    Plan of care was discussed in detail with the patient, who agreed with the plan as above. All questions were answered in detail.     Maureen Bhat, FNP-C  Otolaryngology

## 2024-08-21 NOTE — PROGRESS NOTES
The patient location is: [facility]  The chief complaint leading to consultation is:   Chief Complaint   Patient presents with    Sleep Apnea        Visit type: audiovisual    Face to Face time with patient: 6 mins  30 minutes of total time spent on the encounter, which includes face to face time and non-face to face time preparing to see the patient (eg, review of tests), Obtaining and/or reviewing separately obtained history, Documenting clinical information in the electronic or other health record, Independently interpreting results (not separately reported) and communicating results to the patient/family/caregiver, or Care coordination (not separately reported).         Each patient to whom he or she provides medical services by telemedicine is:  (1) informed of the relationship between the physician and patient and the respective role of any other health care provider with respect to management of the patient; and (2) notified that he or she may decline to receive medical services by telemedicine and may withdraw from such care at any time.    Notes:                                             Pulmonary Outpatient  Visit     Subjective:       Patient ID: Candida Barker is a 29 y.o. female.    Social History     Tobacco Use   Smoking Status Never   Smokeless Tobacco Never            Chief Complaint: Sleep Apnea        Candida Barker is 28 y.o.  Referred to me by Kaylee Yeager MD  [address] [place] [address]   HSAt was +ve for KEIRA  Elevated BP in Dec 2023  220/120 mmHG: on meds: Carvedilol, Enalapril and HCTZ  Cardiology concern for KEIRA  Dr heath  Bed time 9 pm  Wake time 6 am  HTN, BMI, DM  Sleep study was reviewed with patient        08/21/2024  Followup  Doing well  Sleep better  Bed time: 08:45 pm  Waketime: 6 am  No snoring  Rested in AM  BP control: 's  Weight not changed, on Mounjaro  CPAP: some issues with tubing  Download reviewed               Review of Systems   Constitutional:   Negative for fatigue.   Respiratory:  Negative for apnea, snoring and somnolence.    Psychiatric/Behavioral:  Negative for sleep disturbance.    All other systems reviewed and are negative.      Outpatient Encounter Medications as of 8/21/2024   Medication Sig Dispense Refill    ALPRAZolam (XANAX) 0.5 MG tablet Take 1 tablet (0.5 mg total) by mouth nightly as needed for Anxiety. 30 tablet 1    apremilast (OTEZLA) 30 mg Tab Take 1 tablet (30 mg total) by mouth 2 (two) times daily. 90 tablet 1    aspirin (ECOTRIN) 81 MG EC tablet       blood-glucose meter,continuous (DEXCOM ) Misc Use as directed 1 each 0    blood-glucose sensor (DEXCOM G6 SENSOR) Ernestine Apply and change every 10 days 3 each 11    blood-glucose transmitter (DEXCOM G6 TRANSMITTER) Ernestine Use as directed every 90 days 1 each 3    carvediloL (COREG) 12.5 MG tablet Take 1 tablet (12.5 mg total) by mouth 2 (two) times daily. 180 tablet 3    enalapril (VASOTEC) 20 MG tablet Take 1 tablet (20 mg total) by mouth 2 (two) times daily. 180 tablet 3    FLUoxetine 40 MG capsule Take 1 capsule (40 mg total) by mouth once daily. 90 capsule 3    fluticasone propionate (FLONASE) 50 mcg/actuation nasal spray Use 2 sprays to each nostril daily 16 g 0    hydroCHLOROthiazide (HYDRODIURIL) 12.5 MG Tab Take 1 tablet (12.5 mg total) by mouth every morning. 90 tablet 3    hydrOXYzine pamoate (VISTARIL) 25 MG Cap Take 1-2 capsules (25-50 mg total) by mouth daily as needed (insomnia). 90 capsule 1    insulin lispro (HUMALOG KWIKPEN INSULIN) 100 unit/mL pen Inject 5 units before breakfast, 12 units before lunch, 22 units before dinner (39 units per day) 45 mL 0    Lactobacillus rhamnosus GG (CULTURELLE) 10 billion cell capsule Take 1 capsule by mouth once daily.      levocetirizine (XYZAL) 5 MG tablet Take 5 mg by mouth every evening.      levothyroxine (SYNTHROID) 200 MCG tablet Take 1 tablet (200 mcg total) by mouth before breakfast. 90 tablet 3    levothyroxine  "(SYNTHROID) 50 MCG tablet Take 1 tablet (50 mcg total) by mouth before breakfast. 90 tablet 3    metFORMIN (GLUCOPHAGE-XR) 500 MG ER 24hr tablet Take 1,000 mg by mouth 2 (two) times daily with meals.      mupirocin (BACTROBAN) 2 % ointment Apply pea sized amount to inside of the right ear twice daily for 10 days 22 g 1    ondansetron (ZOFRAN-ODT) 4 MG TbDL Take 1 tablet (4 mg total) by mouth every 8 (eight) hours as needed (nausea/vomiting). 30 tablet 0    pen needle, diabetic 32 gauge x 5/32" Ndle TO BE USE WITH INSULIN  FOUR TIMES DAILY 100 each 11    rosuvastatin (CRESTOR) 5 MG tablet Take 1 tablet (5 mg total) by mouth once daily. 90 tablet 3    tirzepatide 12.5 mg/0.5 mL PnIj Inject 12.5 mg into the skin every 7 days. 4 Pen 2    TOUJEO MAX U-300 SOLOSTAR 300 unit/mL (3 mL) insulin pen Inject 50 Units into the skin once daily.      [DISCONTINUED] leflunomide (ARAVA) 20 MG Tab Take 20 mg by mouth once daily.       No facility-administered encounter medications on file as of 8/21/2024.       The following portions of the patient's history were reviewed and updated as appropriate: She  has a past medical history of DM (diabetes mellitus), type 2, HTN (hypertension), Hypothyroidism, unspecified, Mixed hyperlipidemia, Obesity, unspecified, and Polycystic ovaries (2024).  She does not have any pertinent problems on file.  She  has a past surgical history that includes Cholecystectomy (12/2023).  Her family history includes Asthma in her maternal aunt; Cancer in her maternal grandmother; Diabetes in her maternal aunt, maternal grandfather, and mother; Drug abuse in her father; Early death in her mother; Hypertension in her father, maternal aunt, and mother; Miscarriages / Stillbirths in her mother; Stroke in her maternal aunt; Vision loss in her maternal grandmother.  She  reports that she has never smoked. She has never used smokeless tobacco. She reports current alcohol use of about 1.0 standard drink of alcohol per " week. She reports that she does not use drugs.  She has a current medication list which includes the following prescription(s): alprazolam, otezla, aspirin, dexcom , dexcom g6 sensor, dexcom g6 transmitter, carvedilol, enalapril, fluoxetine, fluticasone propionate, hydrochlorothiazide, hydroxyzine pamoate, insulin lispro, lactobacillus rhamnosus gg, levocetirizine, levothyroxine, levothyroxine, metformin, mupirocin, ondansetron, pen needle, diabetic, rosuvastatin, tirzepatide, and toujeo max u-300 solostar.  Current Outpatient Medications on File Prior to Visit   Medication Sig Dispense Refill    ALPRAZolam (XANAX) 0.5 MG tablet Take 1 tablet (0.5 mg total) by mouth nightly as needed for Anxiety. 30 tablet 1    apremilast (OTEZLA) 30 mg Tab Take 1 tablet (30 mg total) by mouth 2 (two) times daily. 90 tablet 1    aspirin (ECOTRIN) 81 MG EC tablet       blood-glucose meter,continuous (DEXCOM ) Misc Use as directed 1 each 0    blood-glucose sensor (DEXCOM G6 SENSOR) Ernestine Apply and change every 10 days 3 each 11    blood-glucose transmitter (DEXCOM G6 TRANSMITTER) Ernestine Use as directed every 90 days 1 each 3    carvediloL (COREG) 12.5 MG tablet Take 1 tablet (12.5 mg total) by mouth 2 (two) times daily. 180 tablet 3    enalapril (VASOTEC) 20 MG tablet Take 1 tablet (20 mg total) by mouth 2 (two) times daily. 180 tablet 3    FLUoxetine 40 MG capsule Take 1 capsule (40 mg total) by mouth once daily. 90 capsule 3    fluticasone propionate (FLONASE) 50 mcg/actuation nasal spray Use 2 sprays to each nostril daily 16 g 0    hydroCHLOROthiazide (HYDRODIURIL) 12.5 MG Tab Take 1 tablet (12.5 mg total) by mouth every morning. 90 tablet 3    hydrOXYzine pamoate (VISTARIL) 25 MG Cap Take 1-2 capsules (25-50 mg total) by mouth daily as needed (insomnia). 90 capsule 1    insulin lispro (HUMALOG KWIKPEN INSULIN) 100 unit/mL pen Inject 5 units before breakfast, 12 units before lunch, 22 units before dinner (39 units per  "day) 45 mL 0    Lactobacillus rhamnosus GG (CULTURELLE) 10 billion cell capsule Take 1 capsule by mouth once daily.      levocetirizine (XYZAL) 5 MG tablet Take 5 mg by mouth every evening.      levothyroxine (SYNTHROID) 200 MCG tablet Take 1 tablet (200 mcg total) by mouth before breakfast. 90 tablet 3    levothyroxine (SYNTHROID) 50 MCG tablet Take 1 tablet (50 mcg total) by mouth before breakfast. 90 tablet 3    metFORMIN (GLUCOPHAGE-XR) 500 MG ER 24hr tablet Take 1,000 mg by mouth 2 (two) times daily with meals.      mupirocin (BACTROBAN) 2 % ointment Apply pea sized amount to inside of the right ear twice daily for 10 days 22 g 1    ondansetron (ZOFRAN-ODT) 4 MG TbDL Take 1 tablet (4 mg total) by mouth every 8 (eight) hours as needed (nausea/vomiting). 30 tablet 0    pen needle, diabetic 32 gauge x 5/32" Ndle TO BE USE WITH INSULIN  FOUR TIMES DAILY 100 each 11    rosuvastatin (CRESTOR) 5 MG tablet Take 1 tablet (5 mg total) by mouth once daily. 90 tablet 3    tirzepatide 12.5 mg/0.5 mL PnIj Inject 12.5 mg into the skin every 7 days. 4 Pen 2    TOUJEO MAX U-300 SOLOSTAR 300 unit/mL (3 mL) insulin pen Inject 50 Units into the skin once daily.      [DISCONTINUED] leflunomide (ARAVA) 20 MG Tab Take 20 mg by mouth once daily.       No current facility-administered medications on file prior to visit.     She has No Known Allergies..      BP Readings from Last 3 Encounters:   08/21/24 132/64   08/02/24 134/78   07/02/24 124/70     Snoring         8/21/2024     4:06 PM   EPWORTH SLEEPINESS SCALE   Sitting and reading 1   Watching TV 0   Sitting, inactive in a public place (e.g. a theatre or a meeting) 0   As a passenger in a car for an hour without a break 2   Lying down to rest in the afternoon when circumstances permit 2   Sitting and talking to someone 0   Sitting quietly after a lunch without alcohol 0   In a car, while stopped for a few minutes in traffic 0   Total score 5                            No data to " "display                          Objective:     Vital Signs (Most Recent)  Vital Signs  Pulse: 80  BP: 132/64  Height and Weight  Height: 5' 5" (165.1 cm)  Weight: 130.2 kg (287 lb)  BSA (Calculated - sq m): 2.44 sq meters  BMI (Calculated): 47.8  Weight in (lb) to have BMI = 25: 149.9]  Wt Readings from Last 2 Encounters:   08/21/24 130.2 kg (287 lb)   08/02/24 130.4 kg (287 lb 7.7 oz)       Physical Exam  Vitals and nursing note reviewed.   HENT:      Head: Normocephalic.   Eyes:      Pupils: Pupils are equal, round, and reactive to light.   Neurological:      Mental Status: She is alert and oriented to person, place, and time.          Laboratory  Lab Results   Component Value Date    WBC 11.77 04/29/2024    RBC 4.74 04/29/2024    HGB 12.1 04/29/2024    HCT 39.6 04/29/2024    MCV 84 04/29/2024    MCH 25.5 (L) 04/29/2024    MCHC 30.6 (L) 04/29/2024    RDW 13.7 04/29/2024     04/29/2024    MPV 11.9 04/29/2024    GRAN 10.3 (H) 04/29/2024    GRAN 87.3 (H) 04/29/2024    LYMPH 1.1 04/29/2024    LYMPH 9.2 (L) 04/29/2024    MONO 0.3 04/29/2024    MONO 2.5 (L) 04/29/2024    EOS 0.0 04/29/2024    BASO 0.05 04/29/2024    EOSINOPHIL 0.0 04/29/2024    BASOPHIL 0.4 04/29/2024       BMP  Lab Results   Component Value Date     04/29/2024    K 3.7 04/29/2024     04/29/2024    CO2 22 (L) 04/29/2024    BUN 14 04/29/2024    CREATININE 0.7 04/29/2024    CALCIUM 9.6 04/29/2024    ANIONGAP 14 04/29/2024    AST 15 04/29/2024    ALT 16 04/29/2024    PROT 8.2 04/29/2024          No results found for: "IGE"     No results found for: "ASPERGILLUS"  No results found for: "AFUMIGATUSCL"     No results found for: "ACE"     Diagnostic Results:  I have personally reviewed today the following studies:    US Pelvis Comp with Transvag NON-OB (xpd  Narrative: EXAMINATION:  US PELVIS COMP WITH TRANSVAG NON-OB (XPD)    CLINICAL HISTORY:  Pelvic and perineal pain    TECHNIQUE:  Transabdominal sonography of the pelvis was performed, " followed by transvaginal sonography to better evaluate the uterus and ovaries.    COMPARISON:  None.    FINDINGS:  Uterus: The uterus measures 7.3 x 3.7 x 5.2 cm in dimension.  No uterine masses appreciated.  There is a normal homogeneous uterine parenchymal echotexture.  The endometrial stripe measures 7 mm, normal for a premenopausal patient.  No fluid/blood products within the endometrial canal.    Ovaries: The right ovary measures 4.1 x 2.7 x 3.4 cm in dimension (total volume = 19.2 cc) and the left ovary measures 3.4 x 1.9 x 2.5 cm in dimension (total volume = 8.6 cc).  There are multiple peripherally situated simple follicles present in both ovaries.  There is a 24 x 18 x 21 mm simple cyst/follicle present within the right ovary.  No solid ovarian masses appreciated.  There is normal arterial and venous color flow present in both ovaries.    Adnexal soft tissues: No free fluid noted in the cul de sac.  No solid or cystic adnexal masses.  Impression: 1. Sonographic findings which could relate to PCOS.  Please correlate clinically.  2. 24 mm simple right ovarian follicle/cyst.    Electronically signed by: Trever Du MD  Date:    08/06/2024  Time:    15:50     PHYSICIAN INTERPRETATION AND COMMENTS: Findings are consistent with mild obstructive sleep apnea (KEIRA).  Indications of cardiac dysrhythmia are recorded. Please refer to sleep disorders clinic  CLINICAL HISTORY: 28 year old female presented with: 17 inch neck, BMI of 46, an [place] sleepiness score of 12, history of  hypertension, diabetes and symptoms of nocturnal snoring, waking up choking and witnessed apneas. Based on the  clinical history, the patient has a high pre-test probability of having Severe KEIRA.  SLEEP STUDY FINDINGS: Patient underwent a 1 night Home Sleep Test and by behavioral criteria, slept for approximately  6.87 hours , with a sleep efficiency of 98%. When considering more subtle measures of sleep disordered breathing, the  overall  respiratory disturbance index is mild(RDI=14) based on a 1% hypopnea desaturation criteria with confirmation by  surrogate arousal indicators. The apneas/hypopneas are accompanied by minimal oxygen desaturation (percent time  below 90% SpO2: 0%, Min SpO2: 86.2%). The average desaturation across all sleep disordered breathing events is 2%.  Snoring occurs for 7.6% (30 dB) of the study. The mean pulse rate is 77.1 BPM, with frequent pulse rate variability (60 events  with >= 6 BPM increase/decrease per hour).  TREATMENT CONSIDERATIONS: For a patient with mild asymptomatic KEIRA, nasal continuous positive airway pressure  (CPAP/AutoPAP) therapy or alternative therapies for treatment should be considered, i.e., Mandibular advancement splint  (MAS), referral to an ENT surgeon for modification to the airway, and/or weight loss or behavioral therapy to reduce the  potential risk of daytime somnolence, hypertension, cardiovascular disease, stroke and diabetes.  DISEASE MANAGEMENT CONSIDERATIONS: Irregularity of the pulse rate signals indicates possible cardiac dysrhythmia. If  clinically appropriate, further cardiac evaluation is suggested. Sleeping pills may increase the severity of untreated KEIRA  and their use should be reevaluated once the KEIRA is treated.      Patient Name Candida Barker Study Ordered By Kaylee Yeager  Date of Night 1 03/21/2024 07:04:40 PM Date of Birth 1995  Identification Number [id number]  Overall AHI (4%)* Overall RDI % time < 90% Sp02 Mean Sp02 % time snoring > 30dB  3 14 0% 97.2% 7.6%  PHYSICIAN INTERPRETATION AND COMMENTS: Findings are consistent with mild obstructive sleep apnea (KEIRA).  Indications of cardiac dysrhythmia are recorded. Please refer to sleep disorders clinic  CLINICAL HISTORY: 28 year old female presented with: 17 inch neck, BMI of 46, an [place] sleepiness score of 12, history of  hypertension, diabetes and symptoms of nocturnal snoring, waking up choking and witnessed apneas.  Based on the  clinical history, the patient has a high pre-test probability of having Severe KEIRA.  SLEEP STUDY FINDINGS: Patient underwent a 1 night Home Sleep Test and by behavioral criteria, slept for approximately  6.87 hours , with a sleep efficiency of 98%. When considering more subtle measures of sleep disordered breathing, the  overall respiratory disturbance index is mild(RDI=14) based on a 1% hypopnea desaturation criteria with confirmation by  surrogate arousal indicators. The apneas/hypopneas are accompanied by minimal oxygen desaturation (percent time  below 90% SpO2: 0%, Min SpO2: 86.2%). The average desaturation across all sleep disordered breathing events is 2%.  Snoring occurs for 7.6% (30 dB) of the study. The mean pulse rate is 77.1 BPM, with frequent pulse rate variability (60 events  with >= 6 BPM increase/decrease per hour).  TREATMENT CONSIDERATIONS: For a patient with mild asymptomatic KEIRA, nasal continuous positive airway pressure  (CPAP/AutoPAP) therapy or alternative therapies for treatment should be considered, i.e., Mandibular advancement splint  (MAS), referral to an ENT surgeon for modification to the airway, and/or weight loss or behavioral therapy to reduce the  potential risk of daytime somnolence, hypertension, cardiovascular disease, stroke and diabetes.  DISEASE MANAGEMENT CONSIDERATIONS: Irregularity of the pulse rate signals indicates possible cardiac dysrhythmia. If  clinically appropriate, further cardiac evaluation is suggested. Sleeping pills may increase the severity of untreated KEIRA  and their use should be reevaluated once the KEIRA is treated.    2024 - 08/15/2024  Patient ID: [id number]  : 1995  Age: 29 years  Gender: Female  [id number] [place]  [place], [address]  Compliance Report  Compliance  Payor Standard  Usage 2024 - 08/15/2024  Usage days 29/30 days (97%)  >= 4 hours 17 days (57%)  < 4 hours 12 days (40%)  Usage  hours 126 hours 46 minutes  Average usage (total days) 4 hours 14 minutes  Average usage (days used) 4 hours 22 minutes  Median usage (days used) 4 hours 43 minutes  Total used hours (value since last reset - 08/15/2024) 282 hours  AirSense 11 AutoSet  Serial number [id number]  Mode AutoSet  Min Pressure 5 cmH2O  Max Pressure 12 cmH2O  EPR Fulltime  EPR level 3  Response Standard  Therapy  Pressure - cmH2O Median: 6.1 95th percentile: 8.3 Maximum: 9.2  Leaks - L/min Median: 0.2 95th percentile: 7.2 Maximum: 14.6  Events per hour AI: 1.4 HI: 0.2 AHI: 1.6  Apnea Index Central: 0.0 Obstructive: 1.4 Unknown: 0.0  RERA Index 0.1  Cheyne-Cantrell respiration (average duration per night) 0 minutes  Assessment/Plan:     Problem List Items Addressed This Visit       Essential hypertension - Primary    Morbid obesity    Type 2 diabetes mellitus with hyperglycemia, with long-term current use of insulin    KEIRA (obstructive sleep apnea)         8/21/2024     4:06 PM   EPWORTH SLEEPINESS SCALE   Sitting and reading 1   Watching TV 0   Sitting, inactive in a public place (e.g. a theatre or a meeting) 0   As a passenger in a car for an hour without a break 2   Lying down to rest in the afternoon when circumstances permit 2   Sitting and talking to someone 0   Sitting quietly after a lunch without alcohol 0   In a car, while stopped for a few minutes in traffic 0   Total score 5     Data 07/17/2024 to 08/15/2024  Usage > 4 hrs was 57%  APAP 5-12  AHI 1.6  Needs to improve adherence                  Follow up in about 6 months (around 2/21/2025), or download.    This note was prepared using voice recognition system and is likely to have sound alike errors that may have been overlooked even after proof reading.  Please call me with any questions    Discussed diagnosis, its evaluation, treatment and usual course. All questions answered.    Thank you for the courtesy of participating in the care of this patient    Brando Springer  MD      Personal Diagnostic Review  []  CXR    []  ECHO    []  ONSAT    []  6MWD    []  LABS    []  CHEST CT    []  PET CT    []  Biopsy results

## 2024-08-23 ENCOUNTER — OFFICE VISIT (OUTPATIENT)
Dept: OTOLARYNGOLOGY | Facility: CLINIC | Age: 29
End: 2024-08-23
Payer: COMMERCIAL

## 2024-08-23 DIAGNOSIS — J02.0 RECURRENT STREPTOCOCCAL PHARYNGITIS: Primary | ICD-10-CM

## 2024-08-23 LAB — GROUP A STREP, MOLECULAR: POSITIVE

## 2024-08-23 PROCEDURE — 99999 PR PBB SHADOW E&M-EST. PATIENT-LVL III: CPT | Mod: PBBFAC,,, | Performed by: NURSE PRACTITIONER

## 2024-08-23 PROCEDURE — 87651 STREP A DNA AMP PROBE: CPT | Mod: PO | Performed by: NURSE PRACTITIONER

## 2024-09-09 ENCOUNTER — OFFICE VISIT (OUTPATIENT)
Dept: ORTHOPEDICS | Facility: CLINIC | Age: 29
End: 2024-09-09
Payer: COMMERCIAL

## 2024-09-09 DIAGNOSIS — G56.03 BILATERAL CARPAL TUNNEL SYNDROME: Primary | ICD-10-CM

## 2024-09-09 PROCEDURE — 3066F NEPHROPATHY DOC TX: CPT | Mod: CPTII,S$GLB,, | Performed by: STUDENT IN AN ORGANIZED HEALTH CARE EDUCATION/TRAINING PROGRAM

## 2024-09-09 PROCEDURE — 1159F MED LIST DOCD IN RCRD: CPT | Mod: CPTII,S$GLB,, | Performed by: STUDENT IN AN ORGANIZED HEALTH CARE EDUCATION/TRAINING PROGRAM

## 2024-09-09 PROCEDURE — 99999 PR PBB SHADOW E&M-EST. PATIENT-LVL III: CPT | Mod: PBBFAC,,, | Performed by: STUDENT IN AN ORGANIZED HEALTH CARE EDUCATION/TRAINING PROGRAM

## 2024-09-09 PROCEDURE — 3051F HG A1C>EQUAL 7.0%<8.0%: CPT | Mod: CPTII,S$GLB,, | Performed by: STUDENT IN AN ORGANIZED HEALTH CARE EDUCATION/TRAINING PROGRAM

## 2024-09-09 PROCEDURE — 3061F NEG MICROALBUMINURIA REV: CPT | Mod: CPTII,S$GLB,, | Performed by: STUDENT IN AN ORGANIZED HEALTH CARE EDUCATION/TRAINING PROGRAM

## 2024-09-09 PROCEDURE — 4010F ACE/ARB THERAPY RXD/TAKEN: CPT | Mod: CPTII,S$GLB,, | Performed by: STUDENT IN AN ORGANIZED HEALTH CARE EDUCATION/TRAINING PROGRAM

## 2024-09-09 PROCEDURE — 99213 OFFICE O/P EST LOW 20 MIN: CPT | Mod: 25,S$GLB,, | Performed by: STUDENT IN AN ORGANIZED HEALTH CARE EDUCATION/TRAINING PROGRAM

## 2024-09-09 PROCEDURE — 1160F RVW MEDS BY RX/DR IN RCRD: CPT | Mod: CPTII,S$GLB,, | Performed by: STUDENT IN AN ORGANIZED HEALTH CARE EDUCATION/TRAINING PROGRAM

## 2024-09-09 PROCEDURE — 76942 ECHO GUIDE FOR BIOPSY: CPT | Mod: S$GLB,,, | Performed by: STUDENT IN AN ORGANIZED HEALTH CARE EDUCATION/TRAINING PROGRAM

## 2024-09-09 PROCEDURE — 20526 THER INJECTION CARP TUNNEL: CPT | Mod: LT,S$GLB,, | Performed by: STUDENT IN AN ORGANIZED HEALTH CARE EDUCATION/TRAINING PROGRAM

## 2024-09-09 RX ORDER — BETAMETHASONE SODIUM PHOSPHATE AND BETAMETHASONE ACETATE 3; 3 MG/ML; MG/ML
6 INJECTION, SUSPENSION INTRA-ARTICULAR; INTRALESIONAL; INTRAMUSCULAR; SOFT TISSUE
Status: DISCONTINUED | OUTPATIENT
Start: 2024-09-09 | End: 2024-09-09 | Stop reason: HOSPADM

## 2024-09-09 RX ADMIN — BETAMETHASONE SODIUM PHOSPHATE AND BETAMETHASONE ACETATE 6 MG: 3; 3 INJECTION, SUSPENSION INTRA-ARTICULAR; INTRALESIONAL; INTRAMUSCULAR; SOFT TISSUE at 03:09

## 2024-09-09 NOTE — PROCEDURES
Sports Medicine US - Guidance for Needle Placement    Date/Time: 9/9/2024 3:40 PM    Performed by: Livan Mendez MD  Authorized by: Livan Mendez MD  Preparation: Patient was prepped and draped in the usual sterile fashion.  Local anesthesia used: no    Anesthesia:  Local anesthesia used: no    Sedation:  Patient sedated: no    Patient tolerance: patient tolerated the procedure well with no immediate complications  Comments: Ultrasound guidance was used for needle localization. Images were saved and stored for documentation. The appropriate structures were visualized. Dynamic visualization of the needle was continuous throughout the procedures and maintained good position.

## 2024-09-09 NOTE — PROCEDURES
Carpal Tunnel    Date/Time: 9/9/2024 3:40 PM    Performed by: Livan Mendez MD  Authorized by: Livan Mendez MD    Consent Done?:  Yes (Verbal)  Indications:  Pain  Site marked: the procedure site was marked    Timeout: prior to procedure the correct patient, procedure, and site was verified    Prep: patient was prepped and draped in usual sterile fashion      Local anesthetic:  Lidocaine 1% without epinephrine  Location:  Wrist  Site:  L carpal tunnel  Ultrasonic Guidance for Needle Placement?: Yes    Needle size:  25 G  Approach:  Volar  Medications:  6 mg betamethasone acetate-betamethasone sodium phosphate 6 mg/mL  Patient tolerance:  Patient tolerated the procedure well with no immediate complications     Additional Comments: Ultrasound guidance was used for needle localization. Images were saved and stored for documentation. The appropriate structures were visualized. Dynamic visualization of the needle was continuous throughout the procedures and maintained good position.

## 2024-09-09 NOTE — PROGRESS NOTES
Patient ID: Candida Barker  YOB: 1995  MRN: [id number]    Chief Complaint: Pain of the Left Hand (Left Carpal tunnel injection) and Follow-up      Referred By: Lissett Solorio MD for Bilateral Carpal Tunnel     History of Present Illness: Candida Barker is a right-hand dominant 29 y.o. female who presents today for followup carpal tunnel, left . Last office visit was on 5/17, where she received a steroid injection to left wrist. Injection has worn off and she is now experiencing a tingling, stabbing pain. She's still having burning and numbness at night in both hands. Patient states that she currently does use braces as needed to help with symptom relief and has been taking Gabapentin but does not see a change with the medication.     05/17/2024 Interval History of Present Illness: Candida Barker is a right-hand dominant 29 y.o. female who presents today with bilateral carpal tunnel, left greater than the right  Reports that she has been having issues for the last couple of months.  Received an EMG from Dr. Lissett Solorio on 5/10/2024.  Rates pain today at a 3/10 and describes as tingling, stabbing pain.  Reports burning and numbness at night in both hands.  Patient states that she currently does use braces as needed to help with symptom relief and has been taking Gabapentin but does not see a change with the medication.     The patient is active in none.  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[place]      Past Medical History:   Past Medical History:   Diagnosis Date    DM (diabetes mellitus), type 2     HTN (hypertension)     Hypothyroidism, unspecified     Mixed hyperlipidemia     Obesity, unspecified     Polycystic ovaries 2024     Past Surgical History:   Procedure Laterality Date    CHOLECYSTECTOMY  12/2023     Family History   Problem Relation Name Age of Onset    Diabetes Mother Beata     Early death Mother Angelica     Hypertension Mother Angelica     Miscarriages / Stillbirths Mother Beata     Drug abuse  Father Israel     Hypertension Father Israel     Diabetes Maternal Grandfather Pablo     Cancer Maternal Grandmother Keysha     Vision loss Maternal Grandmother Keysha     Asthma Maternal Aunt Francisca     Hypertension Maternal Aunt Francisca     Stroke Maternal Aunt Francisca     Diabetes Maternal Aunt Kely      Social History     Socioeconomic History    Marital status:    Tobacco Use    Smoking status: Never    Smokeless tobacco: Never   Substance and Sexual Activity    Alcohol use: Yes     Alcohol/week: 1.0 standard drink of alcohol     Types: 1 Glasses of wine per week    Drug use: Never    Sexual activity: Yes     Partners: Male     Birth control/protection: None     Social Determinants of Health     Financial Resource Strain: Low Risk  (3/18/2024)    Overall Financial Resource Strain (CARDIA)     Difficulty of Paying Living Expenses: Not very hard   Food Insecurity: No Food Insecurity (3/18/2024)    Hunger Vital Sign     Worried About Running Out of Food in the Last Year: Never true     Ran Out of Food in the Last Year: Never true   Transportation Needs: No Transportation Needs (3/18/2024)    PRAPARE - Transportation     Lack of Transportation (Medical): No     Lack of Transportation (Non-Medical): No   Physical Activity: Sufficiently Active (3/18/2024)    Exercise Vital Sign     Days of Exercise per Week: 4 days     Minutes of Exercise per Session: 40 min   Stress: Stress Concern Present (3/18/2024)    [employer] - Occupational Stress Questionnaire     Feeling of Stress : To some extent   Housing Stability: Low Risk  (3/18/2024)    Housing Stability Vital Sign     Unable to Pay for Housing in the Last Year: No     Number of Places Lived in the Last Year: 1     Unstable Housing in the Last Year: No     Medication List with Changes/Refills   Current Medications    ALPRAZOLAM (XANAX) 0.5 MG TABLET    Take 1 tablet (0.5 mg total) by mouth nightly as needed for Anxiety.     "APREMILAST (OTEZLA) 30 MG TAB    Take 1 tablet (30 mg total) by mouth 2 (two) times daily.    ASPIRIN (ECOTRIN) 81 MG EC TABLET        BLOOD-GLUCOSE METER,CONTINUOUS (DEXCOM ) MISC    Use as directed    BLOOD-GLUCOSE SENSOR (DEXCOM G6 SENSOR) GERALDO    Apply and change every 10 days    BLOOD-GLUCOSE TRANSMITTER (DEXCOM G6 TRANSMITTER) GERALDO    Use as directed every 90 days    CARVEDILOL (COREG) 12.5 MG TABLET    Take 1 tablet (12.5 mg total) by mouth 2 (two) times daily.    ENALAPRIL (VASOTEC) 20 MG TABLET    Take 1 tablet (20 mg total) by mouth 2 (two) times daily.    FLUOXETINE 40 MG CAPSULE    Take 1 capsule (40 mg total) by mouth once daily.    FLUTICASONE PROPIONATE (FLONASE) 50 MCG/ACTUATION NASAL SPRAY    Use 2 sprays to each nostril daily    HYDROCHLOROTHIAZIDE (HYDRODIURIL) 12.5 MG TAB    Take 1 tablet (12.5 mg total) by mouth every morning.    HYDROXYZINE PAMOATE (VISTARIL) 25 MG CAP    Take 1-2 capsules (25-50 mg total) by mouth daily as needed (insomnia).    INSULIN LISPRO (HUMALOG KWIKPEN INSULIN) 100 UNIT/ML PEN    Inject 5 units before breakfast, 12 units before lunch, 22 units before dinner (39 units per day)    LACTOBACILLUS RHAMNOSUS GG (CULTURELLE) 10 BILLION CELL CAPSULE    Take 1 capsule by mouth once daily.    LEVOCETIRIZINE (XYZAL) 5 MG TABLET    Take 5 mg by mouth every evening.    LEVOTHYROXINE (SYNTHROID) 200 MCG TABLET    Take 1 tablet (200 mcg total) by mouth before breakfast.    LEVOTHYROXINE (SYNTHROID) 50 MCG TABLET    Take 1 tablet (50 mcg total) by mouth before breakfast.    METFORMIN (GLUCOPHAGE-XR) 500 MG ER 24HR TABLET    Take 1,000 mg by mouth 2 (two) times daily with meals.    MUPIROCIN (BACTROBAN) 2 % OINTMENT    Apply pea sized amount to inside of the right ear twice daily for 10 days    ONDANSETRON (ZOFRAN-ODT) 4 MG TBDL    Take 1 tablet (4 mg total) by mouth every 8 (eight) hours as needed (nausea/vomiting).    PEN NEEDLE, DIABETIC 32 GAUGE X 5/32" NDLE    TO BE USE " WITH INSULIN  FOUR TIMES DAILY    ROSUVASTATIN (CRESTOR) 5 MG TABLET    Take 1 tablet (5 mg total) by mouth once daily.    TIRZEPATIDE 12.5 MG/0.5 ML PNIJ    Inject 12.5 mg into the skin every 7 days.    TOUJEO MAX U-300 SOLOSTAR 300 UNIT/ML (3 ML) INSULIN PEN    Inject 50 Units into the skin once daily.     Review of patient's allergies indicates:  No Known Allergies    Physical Exam:   There is no height or weight on file to calculate BMI.    GENERAL: Well appearing, in no acute distress.  HEAD: Normocephalic and atraumatic.  ENT: External ears and nose grossly normal.  EYES: EOMI bilaterally  PULMONARY: Respirations are grossly even and non-labored.  NEURO: Awake, alert, and oriented x 3.  SKIN: No obvious rashes appreciated.  PSYCH: Mood & affect are appropriate.    Detailed MSK exam:     Left hand/wrist exam:   -TTP: none  -Swelling/ecchymosis: none  -Full ROM  - strength 5/5  -Sensation intact  -Pulses 2+  -Rotational deformity: negative  -Tinel sign: positive  -Phalen sign: positive  -Finkelstein sign: negative      Imaging:  US Pelvis Comp with Transvag NON-OB (xpd  Narrative: EXAMINATION:  US PELVIS COMP WITH TRANSVAG NON-OB (XPD)    CLINICAL HISTORY:  Pelvic and perineal pain    TECHNIQUE:  Transabdominal sonography of the pelvis was performed, followed by transvaginal sonography to better evaluate the uterus and ovaries.    COMPARISON:  None.    FINDINGS:  Uterus: The uterus measures 7.3 x 3.7 x 5.2 cm in dimension.  No uterine masses appreciated.  There is a normal homogeneous uterine parenchymal echotexture.  The endometrial stripe measures 7 mm, normal for a premenopausal patient.  No fluid/blood products within the endometrial canal.    Ovaries: The right ovary measures 4.1 x 2.7 x 3.4 cm in dimension (total volume = 19.2 cc) and the left ovary measures 3.4 x 1.9 x 2.5 cm in dimension (total volume = 8.6 cc).  There are multiple peripherally situated simple follicles present in both ovaries.   There is a 24 x 18 x 21 mm simple cyst/follicle present within the right ovary.  No solid ovarian masses appreciated.  There is normal arterial and venous color flow present in both ovaries.    Adnexal soft tissues: No free fluid noted in the cul de sac.  No solid or cystic adnexal masses.  Impression: 1. Sonographic findings which could relate to PCOS.  Please correlate clinically.  2. 24 mm simple right ovarian follicle/cyst.    Electronically signed by: Trever Du MD  Date:    08/06/2024  Time:    15:50        Relevant imaging results were reviewed and interpreted by me and per my read shows no acute abnormalities.  This was discussed with the patient and / or family today.     Assessment:  Candida Barker is a 29 y.o. female following up for left hand numbness/tingling.   History, physical and radiographs are consistent with a likely diagnosis of left CTS. Steroid injection lasted about 3 months last time and is interested in repeating again today.   Plan: Steroid injection given today (see separate procedure note for details). We discussed the proper protocols after the injection such as no submerging pools, baths tubs, or hot tubs for 24 hr.  Showering is okay today.  We also discussed that blood sugars can be elevated after an injection and asked patient to properly checked her sugars over the next few days and contact their PCP if there are any concerns.  We discussed red flags such as fevers, chills, red, warm, tender joint at the area of injection to please seek medical care immediately.   Continue wrist brace at night. Continue conservative management for pain.   Follow up as needed. All questions answered.      Bilateral carpal tunnel syndrome  -     Sports Medicine US - Guidance for Needle Placement  -     Carpal Tunnel    Other orders  -     Cancel: Large Joint Aspiration/Injection         Ultrasound guidance was used for needle localization. Images were saved and stored for documentation. The  appropriate structures were visualized. Dynamic visualization of the needle was continuous throughout the procedures and maintained good position.      A copy of today's visit note has been sent to the referring provider.     Electronically signed:  Livan Mendez MD, MPH  09/09/2024  11:56 AM

## 2024-09-09 NOTE — PATIENT INSTRUCTIONS
Assessment:  Candida Barker is a 29 y.o. female   Chief Complaint   Patient presents with    Left Hand - Pain     Left Carpal tunnel injection    Follow-up       No diagnosis found.     Plan:  Ultrasound guided left carpal tunnel cortisone injection  We discussed the proper protocols after the injection such as no submerging pools, baths tubs, or hot tubs for 24 hr.  Showering is okay today.  We also discussed that blood sugars can be elevated after an injection and asked patient to properly checked her sugars over the next few days and contact their PCP if there are any concerns.  We discussed red flags such as fevers, chills, red, warm, tender joint at the area of injection to please seek medical care immediately.    Follow up as needed    Follow-up: as needed.    Thank you for choosing [facility] and Dr. Livan Mendez for your orthopedic & sports medicine care. It is our goal to provide you with exceptional care that will help keep you healthy, active, and get you back in the game.    Please do not hesitate to reach out to us via email, phone, or MyChart with any questions, concerns, or feedback.    If you felt that you received exemplary care today, please consider leaving us feedback on [email or web address] at:  [email or web address]    If you are experiencing pain/discomfort ,or have questions after 5pm and would like to be connected to the [facility] on-call team, please call this number and specify which Sports Medicine provider is treating you: [phone]

## 2024-09-10 ENCOUNTER — TELEPHONE (OUTPATIENT)
Dept: FAMILY MEDICINE | Facility: CLINIC | Age: 29
End: 2024-09-10
Payer: COMMERCIAL

## 2024-09-10 DIAGNOSIS — E11.65 TYPE 2 DIABETES MELLITUS WITH HYPERGLYCEMIA, WITH LONG-TERM CURRENT USE OF INSULIN: Primary | ICD-10-CM

## 2024-09-10 DIAGNOSIS — Z79.4 TYPE 2 DIABETES MELLITUS WITH HYPERGLYCEMIA, WITH LONG-TERM CURRENT USE OF INSULIN: Primary | ICD-10-CM

## 2024-09-10 RX ORDER — PEN NEEDLE, DIABETIC 30 GX3/16"
NEEDLE, DISPOSABLE MISCELLANEOUS
Qty: 100 EACH | Refills: 11 | Status: SHIPPED | OUTPATIENT
Start: 2024-09-10

## 2024-09-10 RX ORDER — METFORMIN HYDROCHLORIDE 500 MG/1
1000 TABLET, EXTENDED RELEASE ORAL 2 TIMES DAILY WITH MEALS
Qty: 120 TABLET | Refills: 0 | Status: SHIPPED | OUTPATIENT
Start: 2024-09-10 | End: 2024-10-10

## 2024-09-13 DIAGNOSIS — R11.0 NAUSEA: ICD-10-CM

## 2024-09-13 DIAGNOSIS — F51.01 PRIMARY INSOMNIA: ICD-10-CM

## 2024-09-16 ENCOUNTER — LAB VISIT (OUTPATIENT)
Dept: LAB | Facility: HOSPITAL | Age: 29
End: 2024-09-16
Attending: STUDENT IN AN ORGANIZED HEALTH CARE EDUCATION/TRAINING PROGRAM
Payer: COMMERCIAL

## 2024-09-16 DIAGNOSIS — E78.2 MIXED HYPERLIPIDEMIA: ICD-10-CM

## 2024-09-16 DIAGNOSIS — E11.65 TYPE 2 DIABETES MELLITUS WITH HYPERGLYCEMIA, WITH LONG-TERM CURRENT USE OF INSULIN: ICD-10-CM

## 2024-09-16 DIAGNOSIS — Z79.4 TYPE 2 DIABETES MELLITUS WITH HYPERGLYCEMIA, WITH LONG-TERM CURRENT USE OF INSULIN: ICD-10-CM

## 2024-09-16 DIAGNOSIS — E78.5 HYPERLIPIDEMIA, UNSPECIFIED HYPERLIPIDEMIA TYPE: ICD-10-CM

## 2024-09-16 DIAGNOSIS — I10 ESSENTIAL HYPERTENSION: ICD-10-CM

## 2024-09-16 LAB
ALBUMIN SERPL BCP-MCNC: 3.7 G/DL (ref 3.5–5.2)
ALP SERPL-CCNC: 85 U/L (ref 55–135)
ALT SERPL W/O P-5'-P-CCNC: 13 U/L (ref 10–44)
ANION GAP SERPL CALC-SCNC: 10 MMOL/L (ref 8–16)
AST SERPL-CCNC: 19 U/L (ref 10–40)
BILIRUB SERPL-MCNC: 0.5 MG/DL (ref 0.1–1)
BUN SERPL-MCNC: 13 MG/DL (ref 6–20)
CALCIUM SERPL-MCNC: 9.4 MG/DL (ref 8.7–10.5)
CHLORIDE SERPL-SCNC: 105 MMOL/L (ref 95–110)
CHOLEST SERPL-MCNC: 164 MG/DL (ref 120–199)
CHOLEST/HDLC SERPL: 3.7 {RATIO} (ref 2–5)
CO2 SERPL-SCNC: 26 MMOL/L (ref 23–29)
CREAT SERPL-MCNC: 0.8 MG/DL (ref 0.5–1.4)
EST. GFR  (NO RACE VARIABLE): >60 ML/MIN/1.73 M^2
ESTIMATED AVG GLUCOSE: 140 MG/DL (ref 68–131)
GLUCOSE SERPL-MCNC: 28 MG/DL (ref 70–110)
HBA1C MFR BLD: 6.5 % (ref 4–5.6)
HDLC SERPL-MCNC: 44 MG/DL (ref 40–75)
HDLC SERPL: 26.8 % (ref 20–50)
LDLC SERPL CALC-MCNC: 109.4 MG/DL (ref 63–159)
NONHDLC SERPL-MCNC: 120 MG/DL
POTASSIUM SERPL-SCNC: 3.6 MMOL/L (ref 3.5–5.1)
PROT SERPL-MCNC: 7.5 G/DL (ref 6–8.4)
SODIUM SERPL-SCNC: 141 MMOL/L (ref 136–145)
TRIGL SERPL-MCNC: 53 MG/DL (ref 30–150)

## 2024-09-16 PROCEDURE — 83036 HEMOGLOBIN GLYCOSYLATED A1C: CPT | Performed by: STUDENT IN AN ORGANIZED HEALTH CARE EDUCATION/TRAINING PROGRAM

## 2024-09-16 PROCEDURE — 36415 COLL VENOUS BLD VENIPUNCTURE: CPT | Mod: PO | Performed by: STUDENT IN AN ORGANIZED HEALTH CARE EDUCATION/TRAINING PROGRAM

## 2024-09-16 PROCEDURE — 80053 COMPREHEN METABOLIC PANEL: CPT | Performed by: STUDENT IN AN ORGANIZED HEALTH CARE EDUCATION/TRAINING PROGRAM

## 2024-09-16 PROCEDURE — 80061 LIPID PANEL: CPT | Performed by: STUDENT IN AN ORGANIZED HEALTH CARE EDUCATION/TRAINING PROGRAM

## 2024-09-16 RX ORDER — ONDANSETRON 4 MG/1
4 TABLET, ORALLY DISINTEGRATING ORAL EVERY 8 HOURS PRN
Qty: 30 TABLET | Refills: 0 | Status: SHIPPED | OUTPATIENT
Start: 2024-09-16

## 2024-09-16 RX ORDER — HYDROXYZINE PAMOATE 25 MG/1
25-50 CAPSULE ORAL DAILY PRN
Qty: 90 CAPSULE | Refills: 3 | Status: SHIPPED | OUTPATIENT
Start: 2024-09-16

## 2024-09-16 NOTE — TELEPHONE ENCOUNTER
Refill Routing Note   Medication(s) are not appropriate for processing by [facility] for the following reason(s):        Outside of protocol    [facility] action(s):  Route               Appointments  past 12m or future 3m with PCP    Date Provider   Last Visit   6/20/2024 Kaylee Yeager MD   Next Visit   Visit date not found Kaylee Yeager MD   ED visits in past 90 days: 0        Note composed:8:13 AM 09/16/2024

## 2024-09-17 DIAGNOSIS — E11.65 TYPE 2 DIABETES MELLITUS WITH HYPERGLYCEMIA, WITH LONG-TERM CURRENT USE OF INSULIN: Primary | ICD-10-CM

## 2024-09-17 DIAGNOSIS — Z79.4 TYPE 2 DIABETES MELLITUS WITH HYPERGLYCEMIA, WITH LONG-TERM CURRENT USE OF INSULIN: Primary | ICD-10-CM

## 2024-09-17 RX ORDER — INSULIN GLARGINE 300 U/ML
25 INJECTION, SOLUTION SUBCUTANEOUS DAILY
Start: 2024-09-17

## 2024-09-17 NOTE — PROGRESS NOTES
3m A1c and [facility]    A1c excellent!!! Lets decrease to toujeo 25u continue other diabetic meds. Repeat       Please do not hesitate to call or message with any questions or concerns    Kaylee Yeager MD

## 2024-10-02 DIAGNOSIS — Z79.4 TYPE 2 DIABETES MELLITUS WITH HYPERGLYCEMIA, WITH LONG-TERM CURRENT USE OF INSULIN: ICD-10-CM

## 2024-10-02 DIAGNOSIS — E11.65 TYPE 2 DIABETES MELLITUS WITH HYPERGLYCEMIA, WITH LONG-TERM CURRENT USE OF INSULIN: ICD-10-CM

## 2024-10-02 NOTE — TELEPHONE ENCOUNTER
No care due was identified.  [facility] Embedded Care Due Messages. Reference number: [id number].   10/02/2024 5:03:42 PM CDT

## 2024-10-03 RX ORDER — METFORMIN HYDROCHLORIDE 500 MG/1
1000 TABLET, EXTENDED RELEASE ORAL 2 TIMES DAILY WITH MEALS
Qty: 360 TABLET | Refills: 3 | Status: SHIPPED | OUTPATIENT
Start: 2024-10-03

## 2024-10-03 NOTE — TELEPHONE ENCOUNTER
Refill Routing Note   Medication(s) are not appropriate for processing by [facility] for the following reason(s):        No active prescription written by provider    [facility] action(s):  Defer             Appointments  past 12m or future 3m with PCP    Date Provider   Last Visit   6/20/2024 Kaylee Yeager MD   Next Visit   Visit date not found Kaylee Yeager MD   ED visits in past 90 days: 0        Note composed:10:57 PM 10/02/2024

## 2024-10-24 ENCOUNTER — E-VISIT (OUTPATIENT)
Dept: FAMILY MEDICINE | Facility: CLINIC | Age: 29
End: 2024-10-24
Payer: COMMERCIAL

## 2024-10-24 DIAGNOSIS — R06.2 WHEEZING: ICD-10-CM

## 2024-10-24 DIAGNOSIS — J40 BRONCHITIS: Primary | ICD-10-CM

## 2024-10-24 RX ORDER — METHYLPREDNISOLONE 4 MG/1
TABLET ORAL
Qty: 21 TABLET | Refills: 0 | Status: SHIPPED | OUTPATIENT
Start: 2024-10-24

## 2024-10-24 RX ORDER — AZITHROMYCIN 250 MG/1
TABLET, FILM COATED ORAL
Qty: 6 TABLET | Refills: 0 | Status: SHIPPED | OUTPATIENT
Start: 2024-10-24

## 2024-10-24 NOTE — PROGRESS NOTES
Patient ID: Candida Barker is a 29 y.o. female.    Chief Complaint: General Illness (Entered automatically based on patient selection in [employer].)    The patient initiated a request through [employer] on 10/24/2024 for evaluation and management with a chief complaint of General Illness (Entered automatically based on patient selection in [employer].)     I evaluated the questionnaire submission on 10/24/2024  .    [facility] P[facility] Evisit [facility]-Cough And Cold    10/24/2024 11:32 AM CDT - Filed by Patient   What do you need help with? Cough, Cold, Sore Throat   Do you agree to participate in an E-Visit? Yes   If you have any of the following symptoms, go to your local emergency room or call 911: I acknowledge   Are you pregnant, could you be pregnant, or are you breast feeding? None of the above   What is the main issue you would like addressed today? Cough, wheezing at night, sinus congestion   Do you think you might have COVID or the Flu? No   Have you tested positive for COVID or Flu? No   What symptoms do you currently have?  Cough;  Headache;  Nasal Congestion;  Runny nose;  Sore throat   Describe your cough: Productive (containing mucus)   Describe the mucus: Green;  Yellow;  Thick   Have you had any of the following? None of the above   Have you ever smoked? I have never smoked   Have you had a fever? No   When did your symptoms first appear? 10/21/2024   In the last two weeks, have you been in close contact with someone who has COVID-19 or the Flu? No   List what you have done or taken to help your symptoms. Theraflu, sinus rinses   How severe are your symptoms? Moderate   Have your symptoms gotten better or worse since they started?  Worse   Do you have transportation to get testing if it is needed and ordered for you at an [facility] location? No   Provide any additional information you feel is important. Strep carrier   Please attach any relevant images or files    Are you able to take your vital signs? Yes   Systolic  Blood Pressure: 126   Diastolic Blood Pressure: 80   Weight: 285   Height: 65   Pulse: 82   Temperature: 98.7   Respiration rate:    Pulse Oxygen: 97         Problem List Items Addressed This Visit    None  Visit Diagnoses       Bronchitis    -  Primary    Relevant Medications    methylPREDNISolone (MEDROL DOSEPACK) 4 mg tablet    azithromycin (Z-DEVIN) 250 MG tablet    Wheezing        Relevant Medications    methylPREDNISolone (MEDROL DOSEPACK) 4 mg tablet              Encounter Diagnoses   Name Primary?    Bronchitis Yes    Wheezing         No orders of the defined types were placed in this encounter.     Medications Ordered This Encounter   Medications    azithromycin (Z-DEVIN) 250 MG tablet     Sig: Take 2 tablets by mouth on day 1; Take 1 tablet by mouth on days 2-5     Dispense:  6 tablet     Refill:  0    methylPREDNISolone (MEDROL DOSEPACK) 4 mg tablet     Sig: follow package directions     Dispense:  21 tablet     Refill:  0          Follow up as needed       E-Visit Time Trackin min

## 2024-10-25 ENCOUNTER — TELEPHONE (OUTPATIENT)
Dept: FAMILY MEDICINE | Facility: CLINIC | Age: 29
End: 2024-10-25
Payer: COMMERCIAL

## 2024-10-25 DIAGNOSIS — J40 BRONCHITIS: Primary | ICD-10-CM

## 2024-10-25 RX ORDER — CODEINE PHOSPHATE AND GUAIFENESIN 10; 100 MG/5ML; MG/5ML
10 SOLUTION ORAL 4 TIMES DAILY PRN
Qty: 240 ML | Refills: 0 | Status: SHIPPED | OUTPATIENT
Start: 2024-10-25 | End: 2024-11-04

## 2024-10-25 RX ORDER — CODEINE PHOSPHATE AND GUAIFENESIN 10; 100 MG/5ML; MG/5ML
10 SOLUTION ORAL 4 TIMES DAILY PRN
Qty: 240 ML | Refills: 0 | Status: SHIPPED | OUTPATIENT
Start: 2024-10-25 | End: 2024-10-25

## 2024-11-10 DIAGNOSIS — F41.9 ANXIETY: ICD-10-CM

## 2024-11-11 RX ORDER — ALPRAZOLAM 0.5 MG/1
0.5 TABLET ORAL NIGHTLY PRN
Qty: 30 TABLET | Refills: 1 | Status: SHIPPED | OUTPATIENT
Start: 2024-11-11

## 2024-11-11 NOTE — TELEPHONE ENCOUNTER
No care due was identified.  Health [facility] Embedded Care Due Messages. Reference number: [id number].   11/10/2024 12:56:14 PM CST  
Refill Routing Note   Medication(s) are not appropriate for processing by [facility] for the following reason(s):        Outside of protocol    [facility] action(s):  Route               Appointments  past 12m or future 3m with PCP    Date Provider   Last Visit   6/20/2024 Kaylee Yeager MD   Next Visit   Visit date not found Kayele Yeager MD   ED visits in past 90 days: 0        Note composed:9:28 AM 11/11/2024           
No

## 2024-11-26 ENCOUNTER — HOSPITAL ENCOUNTER (OUTPATIENT)
Dept: RADIOLOGY | Facility: HOSPITAL | Age: 29
Discharge: HOME | End: 2024-11-26
Attending: PHYSICIAN ASSISTANT
Payer: COMMERCIAL

## 2024-11-26 ENCOUNTER — E-VISIT (OUTPATIENT)
Dept: FAMILY MEDICINE | Facility: CLINIC | Age: 29
End: 2024-11-26
Payer: COMMERCIAL

## 2024-11-26 ENCOUNTER — TELEPHONE (OUTPATIENT)
Dept: FAMILY MEDICINE | Facility: CLINIC | Age: 29
End: 2024-11-26
Payer: COMMERCIAL
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
## 2024-11-26 DIAGNOSIS — M25.571 ACUTE RIGHT ANKLE PAIN: ICD-10-CM

## 2024-11-26 DIAGNOSIS — M79.671 RIGHT FOOT PAIN: ICD-10-CM

## 2024-11-26 DIAGNOSIS — M25.579 ANKLE PAIN, UNSPECIFIED CHRONICITY, UNSPECIFIED LATERALITY: Primary | ICD-10-CM

## 2024-11-26 PROCEDURE — 73610 X-RAY EXAM OF ANKLE: CPT | Mod: 26,RT,, | Performed by: RADIOLOGY

## 2024-11-26 PROCEDURE — 73610 X-RAY EXAM OF ANKLE: CPT | Mod: TC,PO,RT

## 2024-11-26 PROCEDURE — 73630 X-RAY EXAM OF FOOT: CPT | Mod: TC,PO,RT

## 2024-11-26 PROCEDURE — 73630 X-RAY EXAM OF FOOT: CPT | Mod: 26,RT,, | Performed by: RADIOLOGY

## 2024-11-26 NOTE — PROGRESS NOTES
Patient ID: Candida Barker is a 29 y.o. female.    Chief Complaint: General Illness (Entered automatically based on patient selection in [facility].)    The patient initiated a request through [facility] on 11/26/2024 for evaluation and management with a chief complaint of General Illness (Entered automatically based on patient selection in [facility].)     I evaluated the questionnaire submission on 11/26/24.    [facility] Peq Evisit General    11/26/2024  2:51 PM CST - Filed by Patient   Do you agree to participate in an E-Visit? Yes   If you have any of the following symptoms, please present to your local emergency room or call 911:  I acknowledge   Choose the state of your primary residence [place]   Select all that apply: None of the above   What is the main issue you would like addressed today? Right ankle pain after fall today   Please describe your symptoms Right ankle pain   Where is your problem located? Right ankle   How severe are your symptoms? Moderate   Have you had these symptoms before? No   How long have you been having these symptoms? Just today   Please list any medications or treatments you have used for your condition and indicate if it was effective or not. Ibuprofen- effective to an extent   What makes this feel better? elevation, not walking   What makes this feel worse? dangling, weight bearing   Are these symptoms related to a condition that you currently have? No   Please describe any probable cause for these symptoms Fall this morning on home steps   Provide any additional information you feel is important. none   Please attach any relevant images or files    Are you able to take your vital signs? Yes   Systolic Blood Pressure: 131   Diastolic Blood Pressure: 68   Weight:    Height:    Pulse: 66   Temperature:    Respiration rate:    Pulse Oxygen: 99          Active Problem List with Overview Notes    Diagnosis Date Noted    Acute pain of right shoulder 06/20/2024     acute onset  - prn Robaxin  - prn  tylenol, heat/ice therapy, stretches   Prn rtc         Plaque psoriasis 05/09/2024    KEIRA (obstructive sleep apnea) 01/09/2024      Home sleep study      Morbid obesity 05/17/2018     Wt Readings from Last 3 Encounters:   03/19/24 1249 131.5 kg (290 lb)       Diabetes Medications               insulin lispro (HUMALOG U-100 INSULIN) 100 unit/mL Crtg Inject 90 Units into the skin 3 (three) times daily. 15 am, 20 lunch, 25 dinner    metFORMIN (GLUCOPHAGE-XR) 500 MG ER 24hr tablet Take 1,000 mg by mouth 2 (two) times daily with meals.    MOUNJARO 7.5 mg/0.5 mL PnIj Inject 7.5 mg into the skin every 7 days.    TOUJEO MAX U-300 SOLOSTAR 300 unit/mL (3 mL) insulin pen Inject 70 Units into the skin once daily.            General weight loss/lifestyle modification strategies discussed: limit sugary drinks, exercise 3-5x per week  Informal exercise measures discussed, e.g. taking stairs instead of elevator.              Essential hypertension 04/21/2017     -at goal today  - Current Hypertension Medications:   Hypertension Medications               carvediloL (COREG) 12.5 MG tablet Take 1 tablet (12.5 mg total) by mouth 2 (two) times daily.    enalapril (VASOTEC) 20 MG tablet Take 20 mg by mouth 2 (two) times daily.    hydroCHLOROthiazide (HYDRODIURIL) 12.5 MG Tab Take 1 tablet (12.5 mg total) by mouth every morning.          -continue lifestyle modification with low sodium diet and exercise   -discussed hypertension disease course and importance of treating high blood pressure  -patient understood and advised of risk of untreated blood pressure.  ER precautions were given   for symptoms of hypertensive urgency and emergency.        Hyperlipidemia 04/21/2017     -chronic condition. Currently stable.      Hyperlipidemia Medications               rosuvastatin (CRESTOR) 5 MG tablet Take 1 tablet (5 mg total) by mouth once daily.              Lab Results   Component Value Date    CHOL 231 (H) 03/20/2024    CHOL 182 05/16/2023     CHOL 201 (H) 02/17/2023     Lab Results   Component Value Date    HDL 43 03/20/2024    HDL 35 05/16/2023    HDL 40 02/17/2023     Lab Results   Component Value Date    LDLCALC 150.8 03/20/2024    LDLCALC 125 (H) 05/16/2023    LDLCALC 139 (H) 02/17/2023     Lab Results   Component Value Date    TRIG 186 (H) 03/20/2024    TRIG 110 05/16/2023    TRIG 109 02/17/2023     Lab Results   Component Value Date    CHOLHDL 18.6 (L) 03/20/2024    CHOLHDL 5.2 (H) 05/16/2023    CHOLHDL 5.0 (H) 02/17/2023          The ASCVD Risk score (Gladis BRANHAM, et al., 2019) failed to calculate for the following reasons:    The 2019 ASCVD risk score is only valid for ages 40 to 79        Postablative hypothyroidism 04/21/2017     S/p ablation  Chronic hx; stable on synthroid  - denies symptoms   Lab Results   Component Value Date    TSH 0.904 03/20/2024       - cont current dose as rxd      Type 2 diabetes mellitus with hyperglycemia, with long-term current use of insulin 04/21/2017     uncontrolled   Had been off meds for few months, restart recheck A1c 3m  Lab Results   Component Value Date    HGBA1C 9.4 (H) 03/20/2024     Hypoglycemic Events: none     -current meds:   Diabetes Medications               insulin lispro (HUMALOG U-100 INSULIN) 100 unit/mL Crtg Inject 90 Units into the skin 3 (three) times daily. 15 am, 20 lunch, 25 dinner    metFORMIN (GLUCOPHAGE-XR) 500 MG ER 24hr tablet Take 1,000 mg by mouth 2 (two) times daily with meals.    MOUNJARO 7.5 mg/0.5 mL PnIj Inject 7.5 mg into the skin every 7 days.    TOUJEO MAX U-300 SOLOSTAR 300 unit/mL (3 mL) insulin pen Inject 70 Units into the skin once daily.          -on statin:   Hyperlipidemia Medications               rosuvastatin (CRESTOR) 5 MG tablet Take 1 tablet (5 mg total) by mouth once daily.          -on ACE-I/ARB:   Hypertension Medications               carvediloL (COREG) 12.5 MG tablet Take 1 tablet (12.5 mg total) by mouth 2 (two) times daily.    enalapril (VASOTEC) 20 MG  tablet Take 20 mg by mouth 2 (two) times daily.    hydroCHLOROthiazide (HYDRODIURIL) 12.5 MG Tab Take 1 tablet (12.5 mg total) by mouth every morning.          -counseling provided on importance of diabetic diet and medication compliance in order to treat diabetes  -discussed diabetes disease course and potential complications  Follow up 3 months           Recent Labs Obtained:  No visits with results within 7 Day(s) from this visit.   Latest known visit with results is:   Lab Visit on 09/16/2024   Component Date Value Ref Range Status    Hemoglobin A1C 09/16/2024 6.5 (H)  4.0 - 5.6 % Final    Comment: ADA Screening Guidelines:  5.7-6.4%  Consistent with prediabetes  >or=6.5%  Consistent with diabetes    High levels of fetal hemoglobin interfere with the HbA1C  assay. Heterozygous hemoglobin variants (HbS, HgC, etc)do  not significantly interfere with this assay.   However, presence of multiple variants may affect accuracy.      Estimated Avg Glucose 09/16/2024 140 (H)  68 - 131 mg/dL Final    Cholesterol 09/16/2024 164  120 - 199 mg/dL Final    Comment: The National Cholesterol Education Program (NCEP) has set the  following guidelines (reference ranges) for Cholesterol:  Optimal.....................<200 mg/dL  Borderline High.............200-239 mg/dL  High........................> or = 240 mg/dL      Triglycerides 09/16/2024 53  30 - 150 mg/dL Final    Comment: The National Cholesterol Education Program (NCEP) has set the  following guidelines (reference values) for triglycerides:  Normal......................<150 mg/dL  Borderline High.............150-199 mg/dL  High........................200-499 mg/dL      HDL 09/16/2024 44  40 - 75 mg/dL Final    Comment: The National Cholesterol Education Program (NCEP) has set the  following guidelines (reference values) for HDL Cholesterol:  Low...............<40 mg/dL  Optimal...........>60 mg/dL      LDL Cholesterol 09/16/2024 109.4  63.0 - 159.0 mg/dL Final    Comment:  The National Cholesterol Education Program (NCEP) has set the  following guidelines (reference values) for LDL Cholesterol:  Optimal.......................<130 mg/dL  Borderline High...............130-159 mg/dL  High..........................160-189 mg/dL  Very High.....................>190 mg/dL      HDL/Cholesterol Ratio 09/16/2024 26.8  20.0 - 50.0 % Final    Total Cholesterol/HDL Ratio 09/16/2024 3.7  2.0 - 5.0 Final    Non-HDL Cholesterol 09/16/2024 120  mg/dL Final    Comment: Risk category and Non-HDL cholesterol goals:  Coronary heart disease (CHD)or equivalent (10-year risk of CHD >20%):  Non-HDL cholesterol goal     <130 mg/dL  Two or more CHD risk factors and 10-year risk of CHD <= 20%:  Non-HDL cholesterol goal     <160 mg/dL  0 to 1 CHD risk factor:  Non-HDL cholesterol goal     <190 mg/dL      Sodium 09/16/2024 141  136 - 145 mmol/L Final    Potassium 09/16/2024 3.6  3.5 - 5.1 mmol/L Final    Chloride 09/16/2024 105  95 - 110 mmol/L Final    CO2 09/16/2024 26  23 - 29 mmol/L Final    Glucose 09/16/2024 28 (LL)  70 - 110 mg/dL Final    Comment: *Critical value notification by LT with confirmation of receipt to Dr. Lyndon Moreno at  Date 9/16/24 Time 1732      BUN 09/16/2024 13  6 - 20 mg/dL Final    Creatinine 09/16/2024 0.8  0.5 - 1.4 mg/dL Final    Calcium 09/16/2024 9.4  8.7 - 10.5 mg/dL Final    Total Protein 09/16/2024 7.5  6.0 - 8.4 g/dL Final    Albumin 09/16/2024 3.7  3.5 - 5.2 g/dL Final    Total Bilirubin 09/16/2024 0.5  0.1 - 1.0 mg/dL Final    Comment: For infants and newborns, interpretation of results should be based  on gestational age, weight and in agreement with clinical  observations.    Premature Infant recommended reference ranges:  Up to 24 hours.............<8.0 mg/dL  Up to 48 hours............<12.0 mg/dL  3-5 days..................<15.0 mg/dL  6-29 days.................<15.0 mg/dL      Alkaline Phosphatase 09/16/2024 85  55 - 135 U/L Final    AST 09/16/2024 19  10 - 40 U/L  Final    ALT 09/16/2024 13  10 - 44 U/L Final    eGFR 09/16/2024 >60.0  >60 mL/min/1.73 m^2 Final    Anion Gap 09/16/2024 10  8 - 16 mmol/L Final       Encounter Diagnoses   Name Primary?    Fall, initial encounter Yes    Acute right ankle pain     Right foot pain         Orders Placed This Encounter   Procedures    X-Ray Ankle Complete Right     Standing Status:   Future     Standing Expiration Date:   11/26/2025     Order Specific Question:   May the Radiologist modify the order per protocol to meet the clinical needs of the patient?     Answer:   Yes     Order Specific Question:   Release to patient     Answer:   Immediate    X-Ray Foot Complete Right     Standing Status:   Future     Standing Expiration Date:   11/26/2025     Order Specific Question:   May the Radiologist modify the order per protocol to meet the clinical needs of the patient?     Answer:   Yes     Order Specific Question:   Release to patient     Answer:   Immediate            E-Visit Time Tracking:

## 2024-12-23 ENCOUNTER — TELEPHONE (OUTPATIENT)
Dept: RHEUMATOLOGY | Facility: CLINIC | Age: 29
End: 2024-12-23
Payer: COMMERCIAL

## 2024-12-23 DIAGNOSIS — L40.0 PLAQUE PSORIASIS: ICD-10-CM

## 2024-12-23 DIAGNOSIS — L40.50 PSORIATIC ARTHRITIS: ICD-10-CM

## 2024-12-23 DIAGNOSIS — L40.9 PSORIASIS: Primary | ICD-10-CM

## 2025-01-13 ENCOUNTER — OFFICE VISIT (OUTPATIENT)
Dept: ORTHOPEDICS | Facility: CLINIC | Age: 30
End: 2025-01-13
Payer: COMMERCIAL

## 2025-01-13 VITALS — BODY MASS INDEX: 47.82 KG/M2 | HEIGHT: 65 IN | WEIGHT: 287 LBS

## 2025-01-13 DIAGNOSIS — G56.02 LEFT CARPAL TUNNEL SYNDROME: Primary | ICD-10-CM

## 2025-01-13 PROCEDURE — 1160F RVW MEDS BY RX/DR IN RCRD: CPT | Mod: CPTII,S$GLB,, | Performed by: STUDENT IN AN ORGANIZED HEALTH CARE EDUCATION/TRAINING PROGRAM

## 2025-01-13 PROCEDURE — 76942 ECHO GUIDE FOR BIOPSY: CPT | Mod: S$GLB,,, | Performed by: STUDENT IN AN ORGANIZED HEALTH CARE EDUCATION/TRAINING PROGRAM

## 2025-01-13 PROCEDURE — 3008F BODY MASS INDEX DOCD: CPT | Mod: CPTII,S$GLB,, | Performed by: STUDENT IN AN ORGANIZED HEALTH CARE EDUCATION/TRAINING PROGRAM

## 2025-01-13 PROCEDURE — 99999 PR PBB SHADOW E&M-EST. PATIENT-LVL IV: CPT | Mod: PBBFAC,,, | Performed by: STUDENT IN AN ORGANIZED HEALTH CARE EDUCATION/TRAINING PROGRAM

## 2025-01-13 PROCEDURE — 20526 THER INJECTION CARP TUNNEL: CPT | Mod: LT,S$GLB,, | Performed by: STUDENT IN AN ORGANIZED HEALTH CARE EDUCATION/TRAINING PROGRAM

## 2025-01-13 PROCEDURE — 1159F MED LIST DOCD IN RCRD: CPT | Mod: CPTII,S$GLB,, | Performed by: STUDENT IN AN ORGANIZED HEALTH CARE EDUCATION/TRAINING PROGRAM

## 2025-01-13 PROCEDURE — 99213 OFFICE O/P EST LOW 20 MIN: CPT | Mod: 25,S$GLB,, | Performed by: STUDENT IN AN ORGANIZED HEALTH CARE EDUCATION/TRAINING PROGRAM

## 2025-01-13 RX ORDER — BETAMETHASONE SODIUM PHOSPHATE AND BETAMETHASONE ACETATE 3; 3 MG/ML; MG/ML
6 INJECTION, SUSPENSION INTRA-ARTICULAR; INTRALESIONAL; INTRAMUSCULAR; SOFT TISSUE
Status: DISCONTINUED | OUTPATIENT
Start: 2025-01-13 | End: 2025-01-13 | Stop reason: HOSPADM

## 2025-01-13 RX ADMIN — BETAMETHASONE SODIUM PHOSPHATE AND BETAMETHASONE ACETATE 6 MG: 3; 3 INJECTION, SUSPENSION INTRA-ARTICULAR; INTRALESIONAL; INTRAMUSCULAR; SOFT TISSUE at 03:01

## 2025-01-13 NOTE — PROGRESS NOTES
Patient ID: Candida Barker  YOB: 1995  MRN: [id number]    Chief Complaint: Numbness and Pain of the Left Wrist      History of Present Illness: Candida Barker is a right-hand dominant 29 y.o. female who presents today with left wrist pain.  Last seen in clinic on 9/9/2024 and received left carpal tunnel cortisone injection.  Reports that her right hand / wrist is not bothering her at this time.  Left wrist / hand started to bother her a few weeks ago with burning and numbness.  Rates her pain today at a 3/10.  Interested in repeating CSI    9/9/2024 Interval History of Present Illness: Candida Barker is a right-hand dominant 29 y.o. female who presents today for followup carpal tunnel, left . Last office visit was on 5/17, where she received a steroid injection to left wrist. Injection has worn off and she is now experiencing a tingling, stabbing pain. She's still having burning and numbness at night in both hands. Patient states that she currently does use braces as needed to help with symptom relief and has been taking Gabapentin but does not see a change with the medication.     05/17/2024 Interval History of Present Illness: Candida Barker is a right-hand dominant 29 y.o. female who presents today with bilateral carpal tunnel, left greater than the right  Reports that she has been having issues for the last couple of months.  Received an EMG from Dr. Lissett Solorio on 5/10/2024.  Rates pain today at a 3/10 and describes as tingling, stabbing pain.  Reports burning and numbness at night in both hands.  Patient states that she currently does use braces as needed to help with symptom relief and has been taking Gabapentin but does not see a change with the medication.     The patient is active in none.  Occupation: Nurse - [employer]      Past Medical History:   Past Medical History:   Diagnosis Date    DM (diabetes mellitus), type 2     HTN (hypertension)     Hypothyroidism, unspecified     Mixed  hyperlipidemia     Obesity, unspecified     Polycystic ovaries 2024     Past Surgical History:   Procedure Laterality Date    CHOLECYSTECTOMY  12/2023     Family History   Problem Relation Name Age of Onset    Diabetes Mother [place]     Early death Mother [place]     Hypertension Mother Beata     Miscarriages / Stillbirths Mother Beata     Drug abuse Father Israel     Hypertension Father [place]     Diabetes Maternal Grandfather Pablo     Cancer Maternal Grandmother Keysha     Vision loss Maternal Grandmother Keysha     Asthma Maternal Aunt Francisca     Hypertension Maternal Aunt Francisca     Stroke Maternal Aunt Francisca     Diabetes Maternal Aunt Kely      Social History     Socioeconomic History    Marital status:    Tobacco Use    Smoking status: Never    Smokeless tobacco: Never   Substance and Sexual Activity    Alcohol use: Yes     Alcohol/week: 1.0 standard drink of alcohol     Types: 1 Glasses of wine per week    Drug use: Never    Sexual activity: Yes     Partners: Male     Birth control/protection: None     Social Drivers of Health     Financial Resource Strain: Low Risk  (3/18/2024)    Overall Financial Resource Strain (CARDIA)     Difficulty of Paying Living Expenses: Not very hard   Food Insecurity: No Food Insecurity (3/18/2024)    Hunger Vital Sign     Worried About Running Out of Food in the Last Year: Never true     Ran Out of Food in the Last Year: Never true   Transportation Needs: No Transportation Needs (3/18/2024)    PRAPARE - Transportation     Lack of Transportation (Medical): No     Lack of Transportation (Non-Medical): No   Physical Activity: Sufficiently Active (3/18/2024)    Exercise Vital Sign     Days of Exercise per Week: 4 days     Minutes of Exercise per Session: 40 min   Stress: Stress Concern Present (3/18/2024)    [employer] - Occupational Stress Questionnaire     Feeling of Stress : To some extent   Housing Stability: Low Risk  (3/18/2024)    Housing  Stability Vital Sign     Unable to Pay for Housing in the Last Year: No     Number of Places Lived in the Last Year: 1     Unstable Housing in the Last Year: No     Medication List with Changes/Refills   Current Medications    ALPRAZOLAM (XANAX) 0.5 MG TABLET    Take 1 tablet (0.5 mg total) by mouth nightly as needed for Anxiety.    APREMILAST (OTEZLA) 30 MG TAB    Take 1 tablet (30 mg total) by mouth 2 (two) times daily.    ASPIRIN (ECOTRIN) 81 MG EC TABLET        AZITHROMYCIN (Z-DEVIN) 250 MG TABLET    Take 2 tablets by mouth on day 1; Take 1 tablet by mouth on days 2-5    BLOOD-GLUCOSE METER,CONTINUOUS (DEXCOM ) MISC    Use as directed    BLOOD-GLUCOSE SENSOR (DEXCOM G6 SENSOR) GERALDO    Apply and change every 10 days    BLOOD-GLUCOSE TRANSMITTER (DEXCOM G6 TRANSMITTER) GERALDO    Use as directed every 90 days    CARVEDILOL (COREG) 12.5 MG TABLET    Take 1 tablet (12.5 mg total) by mouth 2 (two) times daily.    ENALAPRIL (VASOTEC) 20 MG TABLET    Take 1 tablet (20 mg total) by mouth 2 (two) times daily.    FLUOXETINE 40 MG CAPSULE    Take 1 capsule (40 mg total) by mouth once daily.    FLUTICASONE PROPIONATE (FLONASE) 50 MCG/ACTUATION NASAL SPRAY    Use 2 sprays to each nostril daily    HYDROCHLOROTHIAZIDE (HYDRODIURIL) 12.5 MG TAB    Take 1 tablet (12.5 mg total) by mouth every morning.    HYDROXYZINE PAMOATE (VISTARIL) 25 MG CAP    Take 1-2 capsules (25-50 mg total) by mouth daily as needed (insomnia).    INSULIN LISPRO (HUMALOG KWIKPEN INSULIN) 100 UNIT/ML PEN    Inject 5 units before breakfast, 12 units before lunch, 22 units before dinner (39 units per day)    LACTOBACILLUS RHAMNOSUS GG (CULTURELLE) 10 BILLION CELL CAPSULE    Take 1 capsule by mouth once daily.    LEVOCETIRIZINE (XYZAL) 5 MG TABLET    Take 5 mg by mouth every evening.    LEVOTHYROXINE (SYNTHROID) 200 MCG TABLET    Take 1 tablet (200 mcg total) by mouth before breakfast.    LEVOTHYROXINE (SYNTHROID) 50 MCG TABLET    Take 1 tablet (50 mcg  "total) by mouth before breakfast.    METFORMIN (GLUCOPHAGE-XR) 500 MG ER 24HR TABLET    TAKE 2 TABLETS BY MOUTH TWICE A DAY WITH MEALS    METHYLPREDNISOLONE (MEDROL DOSEPACK) 4 MG TABLET    follow package directions    MUPIROCIN (BACTROBAN) 2 % OINTMENT    Apply pea sized amount to inside of the right ear twice daily for 10 days    ONDANSETRON (ZOFRAN-ODT) 4 MG TBDL    Dissolve 1 tablet (4 mg total) by mouth every 8 (eight) hours as needed (nausea/vomiting).    PEN NEEDLE, DIABETIC 32 GAUGE X 5/32" NDLE    TO BE USE WITH INSULIN  FOUR TIMES DAILY    ROSUVASTATIN (CRESTOR) 5 MG TABLET    Take 1 tablet (5 mg total) by mouth once daily.    TIRZEPATIDE 15 MG/0.5 ML PNIJ    Inject 15 mg into the skin every 7 days.    TOUJEO MAX U-300 SOLOSTAR 300 UNIT/ML (3 ML) INSULIN PEN    Inject 26 Units into the skin once daily.     Review of patient's allergies indicates:  No Known Allergies    Physical Exam:   Body mass index is 47.76 kg/m².    GENERAL: Well appearing, in no acute distress.  HEAD: Normocephalic and atraumatic.  ENT: External ears and nose grossly normal.  EYES: EOMI bilaterally  PULMONARY: Respirations are grossly even and non-labored.  NEURO: Awake, alert, and oriented x 3.  SKIN: No obvious rashes appreciated.  PSYCH: Mood & affect are appropriate.    Detailed MSK exam:     Left hand/wrist exam:   -TTP: none  -Swelling/ecchymosis: none  -Full ROM  - strength 5/5  -Sensation intact  -Pulses 2+  -Rotational deformity: negative  -Tinel sign: positive  -Phalen sign: positive  -Finkelstein sign: negative      Imaging:  X-Ray Foot Complete Right  Narrative: EXAMINATION:  XR FOOT COMPLETE 3 VIEW RIGHT    CLINICAL HISTORY:  . Pain in right foot    TECHNIQUE:  AP, lateral, and oblique views of the foot were performed.    COMPARISON:  None    FINDINGS:  There is no evidence to suggest acute fracture or dislocation.  The joint spaces appear to be well maintained.  A dorsal calcaneal enthesophyte is noted.  Impression: " As above    Electronically signed by: Dnony Farah DO  Date:    11/26/2024  Time:    15:51  X-Ray Ankle Complete Right  Narrative: EXAMINATION:  XR ANKLE COMPLETE 3 VIEW RIGHT    CLINICAL HISTORY:  Pain in right ankle and joints of right foot    TECHNIQUE:  AP, lateral, and oblique images of the right ankle were performed.    COMPARISON:  None    FINDINGS:  No acute fracture or dislocation.  A dorsal calcaneal enthesophyte is noted.  No significant soft tissue swelling.  Impression: As above    Electronically signed by: Donny Farah DO  Date:    11/26/2024  Time:    15:51        Relevant imaging results were reviewed and interpreted by me and per my read shows no acute abnormalities.  This was discussed with the patient and / or family today.     Assessment:  Candida Barker is a 29 y.o. female following up for left CTS. Interested in repeating steroid injection today. Not interested in surgery yet and injections have been doing well for her.   Plan: Steroid injection given today (see separate procedure note for details). We discussed the proper protocols after the injection such as no submerging pools, baths tubs, or hot tubs for 24 hr.  Showering is okay today.  We also discussed that blood sugars can be elevated after an injection and asked patient to properly checked her sugars over the next few days and contact their PCP if there are any concerns.  We discussed red flags such as fevers, chills, red, warm, tender joint at the area of injection to please seek medical care immediately.   Continue conservative management for pain.   Follow up as needed. All questions answered.     Left carpal tunnel syndrome  -     Sports Medicine US - Guidance for Needle Placement  -     Carpal Tunnel: L carpal tunnel         Ultrasound guidance was used for needle localization. Images were saved and stored for documentation. The appropriate structures were visualized. Dynamic visualization of the needle was continuous throughout  the procedures and maintained good position.      Electronically signed:  Livan Mendez MD, MPH  01/13/2025  3:47 PM

## 2025-01-13 NOTE — PATIENT INSTRUCTIONS
Assessment:  Candida Barker is a 29 y.o. female   Chief Complaint   Patient presents with    Left Wrist - Numbness, Pain       Encounter Diagnosis   Name Primary?    Left carpal tunnel syndrome Yes        Plan:  Ultrasound guided carpal tunnel cortisone injection  We discussed the proper protocols after the injection such as no submerging pools, baths tubs, or hot tubs for 24 hr.  Showering is okay today.  We also discussed that blood sugars can be elevated after an injection and asked patient to properly checked her sugars over the next few days and contact their PCP if there are any concerns.  We discussed red flags such as fevers, chills, red, warm, tender joint at the area of injection to please seek medical care immediately.    Follow up as needed    Follow-up: as needed.    Thank you for choosing [facility] and Dr. Livan Mendez for your orthopedic & sports medicine care. It is our goal to provide you with exceptional care that will help keep you healthy, active, and get you back in the game.    Please do not hesitate to reach out to us via email, phone, or MyChart with any questions, concerns, or feedback.    If you felt that you received exemplary care today, please consider leaving us feedback on [employer] at:  [email or web address]    If you are experiencing pain/discomfort ,or have questions after 5pm and would like to be connected to the [facility] on-call team, please call this number and specify which Sports Medicine provider is treating you: [phone]

## 2025-01-13 NOTE — PROCEDURES
Sports Medicine US - Guidance for Needle Placement    Date/Time: 1/13/2025 3:40 PM    Performed by: Livan Mendez MD  Authorized by: Livan Mendez MD  Preparation: Patient was prepped and draped in the usual sterile fashion.  Local anesthesia used: no    Anesthesia:  Local anesthesia used: no    Sedation:  Patient sedated: no    Patient tolerance: patient tolerated the procedure well with no immediate complications  Comments: Ultrasound guidance was used for needle localization. Images were saved and stored for documentation. The appropriate structures were visualized. Dynamic visualization of the needle was continuous throughout the procedures and maintained good position.

## 2025-01-13 NOTE — PROCEDURES
Carpal Tunnel: L carpal tunnel    Date/Time: 1/13/2025 3:40 PM    Performed by: Livan Mendez MD  Authorized by: Livan Mendez MD    Consent Done?:  Yes (Verbal)  Indications:  Pain  Site marked: the procedure site was marked    Timeout: prior to procedure the correct patient, procedure, and site was verified    Prep: patient was prepped and draped in usual sterile fashion      Local anesthetic:  Lidocaine 1% without epinephrine  Location:  Wrist  Site:  L carpal tunnel  Ultrasonic Guidance for Needle Placement?: Yes    Needle size:  25 G  Approach:  Volar  Medications:  6 mg betamethasone acetate-betamethasone sodium phosphate 6 mg/mL  Patient tolerance:  Patient tolerated the procedure well with no immediate complications     Additional Comments: Ultrasound guidance was used for needle localization. Images were saved and stored for documentation. The appropriate structures were visualized. Dynamic visualization of the needle was continuous throughout the procedures and maintained good position.

## 2025-01-20 ENCOUNTER — PATIENT MESSAGE (OUTPATIENT)
Dept: ADMINISTRATIVE | Facility: HOSPITAL | Age: 30
End: 2025-01-20
Payer: COMMERCIAL

## 2025-01-24 ENCOUNTER — PATIENT OUTREACH (OUTPATIENT)
Dept: ADMINISTRATIVE | Facility: HOSPITAL | Age: 30
End: 2025-01-24
Payer: COMMERCIAL

## 2025-01-24 ENCOUNTER — OFFICE VISIT (OUTPATIENT)
Dept: FAMILY MEDICINE | Facility: CLINIC | Age: 30
End: 2025-01-24
Payer: COMMERCIAL

## 2025-01-24 ENCOUNTER — HOSPITAL ENCOUNTER (OUTPATIENT)
Dept: RADIOLOGY | Facility: HOSPITAL | Age: 30
Discharge: HOME | End: 2025-01-24
Attending: PHYSICIAN ASSISTANT
Payer: COMMERCIAL

## 2025-01-24 VITALS
BODY MASS INDEX: 46.12 KG/M2 | RESPIRATION RATE: 18 BRPM | TEMPERATURE: 98 F | SYSTOLIC BLOOD PRESSURE: 128 MMHG | DIASTOLIC BLOOD PRESSURE: 77 MMHG | HEART RATE: 92 BPM | WEIGHT: 287 LBS | HEIGHT: 66 IN | OXYGEN SATURATION: 99 %

## 2025-01-24 DIAGNOSIS — M79.645 THUMB PAIN, LEFT: ICD-10-CM

## 2025-01-24 DIAGNOSIS — H66.91 ACUTE RIGHT OTITIS MEDIA: ICD-10-CM

## 2025-01-24 DIAGNOSIS — M79.645 THUMB PAIN, LEFT: Primary | ICD-10-CM

## 2025-01-24 PROCEDURE — 99999 PR PBB SHADOW E&M-EST. PATIENT-LVL V: CPT | Mod: PBBFAC,,, | Performed by: PHYSICIAN ASSISTANT

## 2025-01-24 PROCEDURE — 3078F DIAST BP <80 MM HG: CPT | Mod: CPTII,S$GLB,, | Performed by: PHYSICIAN ASSISTANT

## 2025-01-24 PROCEDURE — 3008F BODY MASS INDEX DOCD: CPT | Mod: CPTII,S$GLB,, | Performed by: PHYSICIAN ASSISTANT

## 2025-01-24 PROCEDURE — 73140 X-RAY EXAM OF FINGER(S): CPT | Mod: 26,LT,, | Performed by: RADIOLOGY

## 2025-01-24 PROCEDURE — 1160F RVW MEDS BY RX/DR IN RCRD: CPT | Mod: CPTII,S$GLB,, | Performed by: PHYSICIAN ASSISTANT

## 2025-01-24 PROCEDURE — 1159F MED LIST DOCD IN RCRD: CPT | Mod: CPTII,S$GLB,, | Performed by: PHYSICIAN ASSISTANT

## 2025-01-24 PROCEDURE — 73140 X-RAY EXAM OF FINGER(S): CPT | Mod: TC,PO,LT

## 2025-01-24 PROCEDURE — 99213 OFFICE O/P EST LOW 20 MIN: CPT | Mod: S$GLB,,, | Performed by: PHYSICIAN ASSISTANT

## 2025-01-24 PROCEDURE — 3074F SYST BP LT 130 MM HG: CPT | Mod: CPTII,S$GLB,, | Performed by: PHYSICIAN ASSISTANT

## 2025-01-24 RX ORDER — AMOXICILLIN 875 MG/1
875 TABLET, FILM COATED ORAL EVERY 12 HOURS
Qty: 14 TABLET | Refills: 0 | Status: SHIPPED | OUTPATIENT
Start: 2025-01-24 | End: 2025-01-31

## 2025-01-24 NOTE — PROGRESS NOTES
Assessment/Plan:    1. Thumb pain, left  Overview:  -L thumb injury >2 days ago  -will obtain XR today  -continue conservative treatment, rest, ice/heat applications, PRN anti-inflammatory medication  -follow up if no improvement in symptoms   -ER precautions for severe or worsening of symptoms     Orders:  -     X-Ray Finger 2 or More Views Left; Future; Expected date: 01/24/2025    2. Acute right otitis media  Overview:  -start antibiotics as prescribed  -OTC Tylenol/Ibuprofen for pain/fever  -follow up if no improvement in symptoms   -ER precautions for severe or worsening of symptoms     Orders:  -     amoxicillin (AMOXIL) 875 MG tablet; Take 1 tablet (875 mg total) by mouth every 12 (twelve) hours. for 7 days  Dispense: 14 tablet; Refill: 0      Follow up if symptoms worsen or fail to improve.    Misa Hamilton PA-C  _____________________________________________________________________________________________________________________________________________________    CC: left thumb pain    HPI: Patient is in clinic today as an established patient here for left thumb pain.     MUSCULOSKELETAL - LEFT THUMB INJURY:  She injured her left thumb two days ago while putting her daughter to sleep, hitting it on the crib. Pain is located at the top of the thumb, just under the nail. She has significant bruising and swelling. She can move the thumb but experiences pain with movement. She is taking ibuprofen for pain management. She denies involvement of other fingers or additional injuries.    ENT - RIGHT EAR:  She reports right ear pain persisting for over a week with a history of right ear infections. She denies fever or any other upper respiratory symptoms. She denies recent antibiotic use.    No other complaints today.    Past Medical History:   Diagnosis Date    DM (diabetes mellitus), type 2     HTN (hypertension)     Hypothyroidism, unspecified     Mixed hyperlipidemia     Obesity, unspecified     Polycystic  ovaries 2024     Past Surgical History:   Procedure Laterality Date    CHOLECYSTECTOMY  12/2023     Review of patient's allergies indicates:  No Known Allergies  Social History     Tobacco Use    Smoking status: Never    Smokeless tobacco: Never   Substance Use Topics    Alcohol use: Yes     Alcohol/week: 1.0 standard drink of alcohol     Types: 1 Glasses of wine per week    Drug use: Never     Family History   Problem Relation Name Age of Onset    Diabetes Mother [place]     Early death Mother Beata     Hypertension Mother [place]     Miscarriages / Stillbirths Mother [place]     Drug abuse Father Israel     Hypertension Father Israel     Diabetes Maternal Grandfather Pablo     Cancer Maternal Grandmother Keysha     Vision loss Maternal Grandmother Keysha     Asthma Maternal Aunt Francisca     Hypertension Maternal Aunt Francisca     Stroke Maternal Aunt Francisca     Diabetes Maternal Aunt Kely      Current Outpatient Medications on File Prior to Visit   Medication Sig Dispense Refill    ALPRAZolam (XANAX) 0.5 MG tablet Take 1 tablet (0.5 mg total) by mouth nightly as needed for Anxiety. 30 tablet 1    apremilast (OTEZLA) 30 mg Tab Take 1 tablet (30 mg total) by mouth 2 (two) times daily. 90 tablet 1    aspirin (ECOTRIN) 81 MG EC tablet       blood-glucose meter,continuous (DEXCOM ) Misc Use as directed 1 each 0    blood-glucose sensor (DEXCOM G6 SENSOR) Ernestine Apply and change every 10 days 3 each 11    blood-glucose transmitter (DEXCOM G6 TRANSMITTER) Ernestine Use as directed every 90 days 1 each 3    carvediloL (COREG) 12.5 MG tablet Take 1 tablet (12.5 mg total) by mouth 2 (two) times daily. 180 tablet 3    enalapril (VASOTEC) 20 MG tablet Take 1 tablet (20 mg total) by mouth 2 (two) times daily. 180 tablet 3    FLUoxetine 40 MG capsule Take 1 capsule (40 mg total) by mouth once daily. 90 capsule 3    fluticasone propionate (FLONASE) 50 mcg/actuation nasal spray Use 2 sprays to each nostril daily 16 g 0     "hydroCHLOROthiazide (HYDRODIURIL) 12.5 MG Tab Take 1 tablet (12.5 mg total) by mouth every morning. 90 tablet 3    hydrOXYzine pamoate (VISTARIL) 25 MG Cap Take 1-2 capsules (25-50 mg total) by mouth daily as needed (insomnia). 90 capsule 3    insulin lispro (HUMALOG KWIKPEN INSULIN) 100 unit/mL pen Inject 5 units before breakfast, 12 units before lunch, 22 units before dinner (39 units per day) 30 mL 1    Lactobacillus rhamnosus GG (CULTURELLE) 10 billion cell capsule Take 1 capsule by mouth once daily.      levocetirizine (XYZAL) 5 MG tablet Take 5 mg by mouth every evening.      levothyroxine (SYNTHROID) 200 MCG tablet Take 1 tablet (200 mcg total) by mouth before breakfast. 90 tablet 3    levothyroxine (SYNTHROID) 50 MCG tablet Take 1 tablet (50 mcg total) by mouth before breakfast. 90 tablet 3    metFORMIN (GLUCOPHAGE-XR) 500 MG ER 24hr tablet TAKE 2 TABLETS BY MOUTH TWICE A DAY WITH MEALS 360 tablet 3    mupirocin (BACTROBAN) 2 % ointment Apply pea sized amount to inside of the right ear twice daily for 10 days 22 g 1    ondansetron (ZOFRAN-ODT) 4 MG TbDL Dissolve 1 tablet (4 mg total) by mouth every 8 (eight) hours as needed (nausea/vomiting). 30 tablet 0    pen needle, diabetic 32 gauge x 5/32" Ndle TO BE USE WITH INSULIN  FOUR TIMES DAILY 100 each 11    rosuvastatin (CRESTOR) 5 MG tablet Take 1 tablet (5 mg total) by mouth once daily. 90 tablet 3    tirzepatide 15 mg/0.5 mL PnIj Inject 15 mg into the skin every 7 days. 4 Pen 2    TOUJEO MAX U-300 SOLOSTAR 300 unit/mL (3 mL) insulin pen Inject 26 Units into the skin once daily.       No current facility-administered medications on file prior to visit.       Review of Systems   Constitutional:  Negative for chills, diaphoresis, fatigue and fever.   HENT:  Positive for ear pain. Negative for congestion, postnasal drip, sinus pain and sore throat.    Eyes:  Negative for pain and redness.   Respiratory:  Negative for cough, chest tightness and shortness of " "breath.    Cardiovascular:  Negative for chest pain and leg swelling.   Gastrointestinal:  Negative for abdominal pain, constipation, diarrhea, nausea and vomiting.   Genitourinary:  Negative for dysuria and hematuria.   Musculoskeletal:  Positive for arthralgias and joint swelling.   Skin:  Negative for rash.   Neurological:  Negative for dizziness, syncope and headaches.   Psychiatric/Behavioral:  Negative for dysphoric mood. The patient is not nervous/anxious.        Vitals:    01/24/25 1431   BP: 128/77   BP Location: Right arm   Patient Position: Sitting   Pulse: 92   Resp: 18   Temp: 98.2 °F (36.8 °C)   TempSrc: Oral   SpO2: 99%   Weight: 130.2 kg (287 lb)   Height: 5' 6" (1.676 m)       Wt Readings from Last 3 Encounters:   01/24/25 130.2 kg (287 lb)   01/13/25 130.2 kg (287 lb)   08/21/24 130.2 kg (287 lb)       Physical Exam  Constitutional:       General: She is not in acute distress.     Appearance: Normal appearance. She is well-developed.   HENT:      Head: Normocephalic and atraumatic.      Right Ear: A middle ear effusion is present. Tympanic membrane is erythematous.      Left Ear: Tympanic membrane normal.      Nose: Nose normal.      Mouth/Throat:      Mouth: Mucous membranes are moist.      Pharynx: Oropharynx is clear.   Eyes:      Conjunctiva/sclera: Conjunctivae normal.   Cardiovascular:      Rate and Rhythm: Normal rate and regular rhythm.      Pulses: Normal pulses.      Heart sounds: Normal heart sounds. No murmur heard.  Pulmonary:      Effort: Pulmonary effort is normal. No respiratory distress.      Breath sounds: Normal breath sounds.   Abdominal:      General: Bowel sounds are normal. There is no distension.      Palpations: Abdomen is soft.      Tenderness: There is no abdominal tenderness.   Musculoskeletal:         General: Normal range of motion.      Left hand: Swelling (thumb) and tenderness (thumb) present. No deformity. Normal range of motion.      Cervical back: Normal range of " motion and neck supple.   Skin:     General: Skin is warm and dry.      Findings: No rash.   Neurological:      General: No focal deficit present.      Mental Status: She is alert and oriented to person, place, and time.   Psychiatric:         Mood and Affect: Mood normal.         Behavior: Behavior normal.         Health Maintenance   Topic Date Due    COVID-19 Vaccine (3 - 2024-25 season) 09/01/2024    Foot Exam  11/10/2024    Diabetic Eye Exam  01/10/2025    Hemoglobin A1c  03/16/2025    Diabetes Urine Screening  03/20/2025    Lipid Panel  09/16/2025    Pap Smear  07/02/2027    TETANUS VACCINE  10/14/2032    RSV Vaccine (Age 60+ and Pregnant patients) (1 - 1-dose 75+ series) 04/21/2070    Hepatitis C Screening  Completed    Influenza Vaccine  Completed    HIV Screening  Completed    Pneumococcal Vaccines (Age 0-49)  Completed     DISCLAIMER: This note was compiled by using a speech recognition dictation system and therefore please be aware that typographical / speech recognition errors can and do occur.  Please contact me if you see any errors specifically.  Consent was obtained for [employer] recording system prior to the visit.

## 2025-01-24 NOTE — PROGRESS NOTES
Replying to Campaign Questionnaire for Overdue HM: DM Eye Exam    Per chart review, last eye exam was done at [facility].  Portal message sent to patient.

## 2025-01-25 ENCOUNTER — LAB VISIT (OUTPATIENT)
Dept: LAB | Facility: HOSPITAL | Age: 30
End: 2025-01-25
Attending: STUDENT IN AN ORGANIZED HEALTH CARE EDUCATION/TRAINING PROGRAM
Payer: COMMERCIAL

## 2025-01-25 DIAGNOSIS — L40.0 PLAQUE PSORIASIS: ICD-10-CM

## 2025-01-25 DIAGNOSIS — L40.9 PSORIASIS: ICD-10-CM

## 2025-01-25 DIAGNOSIS — L40.50 PSORIATIC ARTHRITIS: ICD-10-CM

## 2025-01-25 LAB
ALBUMIN SERPL BCP-MCNC: 3.9 G/DL (ref 3.5–5.2)
ALP SERPL-CCNC: 93 U/L (ref 40–150)
ALT SERPL W/O P-5'-P-CCNC: 23 U/L (ref 10–44)
ANION GAP SERPL CALC-SCNC: 12 MMOL/L (ref 8–16)
AST SERPL-CCNC: 19 U/L (ref 10–40)
BASOPHILS # BLD AUTO: 0.1 K/UL (ref 0–0.2)
BASOPHILS NFR BLD: 1.3 % (ref 0–1.9)
BILIRUB SERPL-MCNC: 0.3 MG/DL (ref 0.1–1)
BUN SERPL-MCNC: 11 MG/DL (ref 6–20)
CALCIUM SERPL-MCNC: 9.8 MG/DL (ref 8.7–10.5)
CHLORIDE SERPL-SCNC: 100 MMOL/L (ref 95–110)
CO2 SERPL-SCNC: 25 MMOL/L (ref 23–29)
CREAT SERPL-MCNC: 1 MG/DL (ref 0.5–1.4)
CRP SERPL-MCNC: 6.1 MG/L (ref 0–8.2)
DIFFERENTIAL METHOD BLD: ABNORMAL
EOSINOPHIL # BLD AUTO: 0.2 K/UL (ref 0–0.5)
EOSINOPHIL NFR BLD: 2.1 % (ref 0–8)
ERYTHROCYTE [DISTWIDTH] IN BLOOD BY AUTOMATED COUNT: 13.5 % (ref 11.5–14.5)
ERYTHROCYTE [SEDIMENTATION RATE] IN BLOOD BY WESTERGREN METHOD: 39 MM/HR (ref 0–36)
EST. GFR  (NO RACE VARIABLE): >60 ML/MIN/1.73 M^2
GLUCOSE SERPL-MCNC: 171 MG/DL (ref 70–110)
HCT VFR BLD AUTO: 41.7 % (ref 37–48.5)
HGB BLD-MCNC: 12.9 G/DL (ref 12–16)
IMM GRANULOCYTES # BLD AUTO: 0.02 K/UL (ref 0–0.04)
IMM GRANULOCYTES NFR BLD AUTO: 0.3 % (ref 0–0.5)
LYMPHOCYTES # BLD AUTO: 1.8 K/UL (ref 1–4.8)
LYMPHOCYTES NFR BLD: 24.1 % (ref 18–48)
MCH RBC QN AUTO: 26 PG (ref 27–31)
MCHC RBC AUTO-ENTMCNC: 30.9 G/DL (ref 32–36)
MCV RBC AUTO: 84 FL (ref 82–98)
MONOCYTES # BLD AUTO: 0.4 K/UL (ref 0.3–1)
MONOCYTES NFR BLD: 5.5 % (ref 4–15)
NEUTROPHILS # BLD AUTO: 5 K/UL (ref 1.8–7.7)
NEUTROPHILS NFR BLD: 66.7 % (ref 38–73)
NRBC BLD-RTO: 0 /100 WBC
PLATELET # BLD AUTO: 315 K/UL (ref 150–450)
PMV BLD AUTO: 12.5 FL (ref 9.2–12.9)
POTASSIUM SERPL-SCNC: 3.7 MMOL/L (ref 3.5–5.1)
PROT SERPL-MCNC: 8.1 G/DL (ref 6–8.4)
RBC # BLD AUTO: 4.96 M/UL (ref 4–5.4)
SODIUM SERPL-SCNC: 137 MMOL/L (ref 136–145)
WBC # BLD AUTO: 7.48 K/UL (ref 3.9–12.7)

## 2025-01-25 PROCEDURE — 85651 RBC SED RATE NONAUTOMATED: CPT | Performed by: STUDENT IN AN ORGANIZED HEALTH CARE EDUCATION/TRAINING PROGRAM

## 2025-01-25 PROCEDURE — 36415 COLL VENOUS BLD VENIPUNCTURE: CPT | Mod: PO | Performed by: STUDENT IN AN ORGANIZED HEALTH CARE EDUCATION/TRAINING PROGRAM

## 2025-01-25 PROCEDURE — 85025 COMPLETE CBC W/AUTO DIFF WBC: CPT | Performed by: STUDENT IN AN ORGANIZED HEALTH CARE EDUCATION/TRAINING PROGRAM

## 2025-01-25 PROCEDURE — 86140 C-REACTIVE PROTEIN: CPT | Performed by: STUDENT IN AN ORGANIZED HEALTH CARE EDUCATION/TRAINING PROGRAM

## 2025-01-25 PROCEDURE — 80053 COMPREHEN METABOLIC PANEL: CPT | Performed by: STUDENT IN AN ORGANIZED HEALTH CARE EDUCATION/TRAINING PROGRAM

## 2025-01-27 ENCOUNTER — OFFICE VISIT (OUTPATIENT)
Dept: FAMILY MEDICINE | Facility: CLINIC | Age: 30
End: 2025-01-27
Payer: COMMERCIAL

## 2025-01-27 VITALS
TEMPERATURE: 98 F | HEIGHT: 66 IN | OXYGEN SATURATION: 98 % | RESPIRATION RATE: 18 BRPM | BODY MASS INDEX: 46.61 KG/M2 | SYSTOLIC BLOOD PRESSURE: 134 MMHG | WEIGHT: 290 LBS | HEART RATE: 90 BPM | DIASTOLIC BLOOD PRESSURE: 86 MMHG

## 2025-01-27 DIAGNOSIS — E66.01 MORBID OBESITY: ICD-10-CM

## 2025-01-27 DIAGNOSIS — L40.50 PSORIATIC ARTHRITIS: Primary | ICD-10-CM

## 2025-01-27 DIAGNOSIS — F41.9 ANXIETY: ICD-10-CM

## 2025-01-27 DIAGNOSIS — Z79.4 TYPE 2 DIABETES MELLITUS WITH HYPERGLYCEMIA, WITH LONG-TERM CURRENT USE OF INSULIN: ICD-10-CM

## 2025-01-27 DIAGNOSIS — E11.65 TYPE 2 DIABETES MELLITUS WITH HYPERGLYCEMIA, WITH LONG-TERM CURRENT USE OF INSULIN: ICD-10-CM

## 2025-01-27 PROCEDURE — 1159F MED LIST DOCD IN RCRD: CPT | Mod: CPTII,S$GLB,, | Performed by: STUDENT IN AN ORGANIZED HEALTH CARE EDUCATION/TRAINING PROGRAM

## 2025-01-27 PROCEDURE — 3075F SYST BP GE 130 - 139MM HG: CPT | Mod: CPTII,S$GLB,, | Performed by: STUDENT IN AN ORGANIZED HEALTH CARE EDUCATION/TRAINING PROGRAM

## 2025-01-27 PROCEDURE — 99999 PR PBB SHADOW E&M-EST. PATIENT-LVL V: CPT | Mod: PBBFAC,,, | Performed by: STUDENT IN AN ORGANIZED HEALTH CARE EDUCATION/TRAINING PROGRAM

## 2025-01-27 PROCEDURE — G2211 COMPLEX E/M VISIT ADD ON: HCPCS | Mod: S$GLB,,, | Performed by: STUDENT IN AN ORGANIZED HEALTH CARE EDUCATION/TRAINING PROGRAM

## 2025-01-27 PROCEDURE — 3079F DIAST BP 80-89 MM HG: CPT | Mod: CPTII,S$GLB,, | Performed by: STUDENT IN AN ORGANIZED HEALTH CARE EDUCATION/TRAINING PROGRAM

## 2025-01-27 PROCEDURE — 1160F RVW MEDS BY RX/DR IN RCRD: CPT | Mod: CPTII,S$GLB,, | Performed by: STUDENT IN AN ORGANIZED HEALTH CARE EDUCATION/TRAINING PROGRAM

## 2025-01-27 PROCEDURE — 99214 OFFICE O/P EST MOD 30 MIN: CPT | Mod: S$GLB,,, | Performed by: STUDENT IN AN ORGANIZED HEALTH CARE EDUCATION/TRAINING PROGRAM

## 2025-01-27 PROCEDURE — 3008F BODY MASS INDEX DOCD: CPT | Mod: CPTII,S$GLB,, | Performed by: STUDENT IN AN ORGANIZED HEALTH CARE EDUCATION/TRAINING PROGRAM

## 2025-01-27 RX ORDER — INSULIN GLARGINE 300 U/ML
60 INJECTION, SOLUTION SUBCUTANEOUS DAILY
Start: 2025-01-27

## 2025-01-27 RX ORDER — HYDROXYZINE HYDROCHLORIDE 25 MG/1
25 TABLET, FILM COATED ORAL 3 TIMES DAILY PRN
Qty: 180 TABLET | Refills: 3 | Status: SHIPPED | OUTPATIENT
Start: 2025-01-27

## 2025-01-27 RX ORDER — FLUOXETINE HYDROCHLORIDE 20 MG/1
60 CAPSULE ORAL DAILY
Qty: 180 CAPSULE | Refills: 3 | Status: SHIPPED | OUTPATIENT
Start: 2025-01-27

## 2025-01-28 NOTE — PROGRESS NOTES
1. Psoriatic arthritis    2. Type 2 diabetes mellitus with hyperglycemia, with long-term current use of insulin  Overview:  Well controlled   Lab Results   Component Value Date    HGBA1C 6.5 (H) 09/16/2024       Hypoglycemic Events: none     -current meds:   Diabetes Medications               insulin lispro (HUMALOG KWIKPEN INSULIN) 100 unit/mL pen Inject 5 units before breakfast, 12 units before lunch, 22 units before dinner (39 units per day)    metFORMIN (GLUCOPHAGE-XR) 500 MG ER 24hr tablet TAKE 2 TABLETS BY MOUTH TWICE A DAY WITH MEALS    tirzepatide 15 mg/0.5 mL PnIj Inject 15 mg into the skin every 7 days.    TOUJEO MAX U-300 SOLOSTAR 300 unit/mL (3 mL) insulin pen Inject 60 Units into the skin once daily.          -on statin:   Hyperlipidemia Medications               rosuvastatin (CRESTOR) 5 MG tablet Take 1 tablet (5 mg total) by mouth once daily.          -on ACE-I/ARB:   Hypertension Medications               carvediloL (COREG) 12.5 MG tablet Take 1 tablet (12.5 mg total) by mouth 2 (two) times daily.    enalapril (VASOTEC) 20 MG tablet Take 1 tablet (20 mg total) by mouth 2 (two) times daily.    hydroCHLOROthiazide 12.5 MG Tab Take 1 tablet (12.5 mg total) by mouth every morning.          -counseling provided on importance of diabetic diet and medication compliance in order to treat diabetes  -discussed diabetes disease course and potential complications  Follow up 3 months       Orders:  -     TOUJEO MAX U-300 SOLOSTAR 300 unit/mL (3 mL) insulin pen; Inject 60 Units into the skin once daily.  -     tirzepatide 15 mg/0.5 mL PnIj; Inject 15 mg into the skin every 7 days.  Dispense: 4 Pen; Refill: 6  -     blood sugar diagnostic (BLOOD GLUCOSE TEST) Strp; Test glucose 1 x daily  Dispense: 50 strip; Refill: 11    3. Morbid obesity  Overview:  Body mass index is 46.81 kg/m². Morbid obesity complicates all aspects of disease management from diagnostic modalities to treatment. Weight loss encouraged and  health benefits explained to patient.         4. Anxiety  -     FLUoxetine 20 MG capsule; Take 3 capsules (60 mg total) by mouth once daily.  Dispense: 180 capsule; Refill: 3  -     Ambulatory referral/consult to Psychiatry; Future; Expected date: 02/03/2025  -     hydrOXYzine HCL (ATARAX) 25 MG tablet; Take 1 tablet (25 mg total) by mouth 3 (three) times daily as needed for Itching.  Dispense: 180 tablet; Refill: 3         Assessment & Plan    DIABETES:  - Assessed patient's diabetes management; noted improved blood sugar control (171 mg/dL) with recent reinitiation of diabetic medications.  - Instructed the patient to monitor blood sugar regularly once Dexcom (continuous glucose monitor) arrives.  - Modified Metformin dosing to 1500 mg at night and 500 mg during the day to alleviate stomach discomfort.  - Continued Toujeo insulin at 60 units.  - Maintained Jardiance (empagliflozin) at 15 mg daily.  - Ordered blood sugar test strips.  - Scheduled comprehensive metabolic panel and HbA1c for mid-April.  - Recommend regular foot exams and eye exams.  - Advised regular exercise.  - Scheduled follow up in 3 months (mid-April) for lab review, blood pressure check, and foot exam.    ANXIETY:  - Evaluated anxiety symptoms; patient reporting increased anxiety, impulsivity, and sleep disturbances.  - Considered adjustment of psychiatric medications due to reduced efficacy of current regimen.  - Increased Prozac (fluoxetine) from 40 mg to 60 mg daily.  - Continued hydroxyzine; instructed to try 1 tablet during the day for anxiety, or half a tablet if 1 causes drowsiness.  - Increased Xanax (alprazolam) from 0.5 mg to 1 mg (two 0.5 mg tablets) as needed for severe anxiety.  - Referred to psychiatry for further evaluation and management of anxiety and potential bipolar symptoms.  - Instructed the patient to contact the office to report back on effectiveness of hydroxyzine and increased Prozac dose.    PSORIASIS:  - Continued  Otezla for psoriasis treatment, as prescribed by Dr. Correa.    HYPERTENSION:  - Instructed the patient to recheck blood pressure within the next few days.  - Continued carvedilol.  - Maintained hydrochlorothiazide.  - Continued lisinopril.  - Scheduled follow up in 3 months (mid-April) for blood pressure check.    THYROID DISORDER:  - Continued levothyroxine 250 mcg.    HYPERLIPIDEMIA:  - Continued Crestor (rosuvastatin) 5 mg.    WEIGHT MANAGEMENT:  - Discussed importance of regular exercise for mental health and overall well-being.  - Patient to resume regular exercise routine, aiming for 4 days a week at the gym.    OPHTHALMOLOGY REFERRAL:  - Patient to schedule eye exam at [facility].    MEDICATIONS/SUPPLEMENTS:  - Continued aspirin.             Kaylee Yeager MD  _________________________________________________________________________      Patient ID: Candida Barker is a 29 y.o. female.    History of Present Illness    CHIEF COMPLAINT:  Patient presents today for follow up of anxiety and diabetes management.    ANXIETY AND MOOD:  She reports experiencing increased anxiety over the past couple of months, particularly with routine changes and being a passenger in a vehicle. She feels anxious constantly and describes episodes of losing control. Family members have noted recent impulsive behavior, particularly with spending and decision-making. She reports sleep disturbances despite taking sleep medication.    DIABETES:  She restarted diabetic medications approximately 2 weeks ago after a one-month break. She reports stomach upset with Metformin. She is currently taking 60 units of insulin and denies hypoglycemic episodes or feeling unwell with current regimen.    EXERCISE:  She previously exercised at the gym 4 days per week but has decreased frequency recently.    OTHER MEDICAL CONDITIONS:  She reports having fluid in her ear. She has psoriasis under management by Dr. Correa.          Past medical histories  reviewed, including past medical, surgical, family and social histories.      Current Outpatient Medications on File Prior to Visit   Medication Sig Dispense Refill    ALPRAZolam (XANAX) 0.5 MG tablet Take 1 tablet (0.5 mg total) by mouth nightly as needed for Anxiety. 30 tablet 1    amoxicillin (AMOXIL) 875 MG tablet Take 1 tablet (875 mg total) by mouth every 12 (twelve) hours. for 7 days 14 tablet 0    apremilast (OTEZLA) 30 mg Tab Take 1 tablet (30 mg total) by mouth 2 (two) times daily. 90 tablet 1    aspirin (ECOTRIN) 81 MG EC tablet       blood-glucose meter,continuous (DEXCOM ) Misc Use as directed 1 each 0    blood-glucose sensor (DEXCOM G6 SENSOR) Ernestine Apply and change every 10 days 3 each 11    blood-glucose transmitter (DEXCOM G6 TRANSMITTER) Ernestine Use as directed every 90 days 1 each 3    carvediloL (COREG) 12.5 MG tablet Take 1 tablet (12.5 mg total) by mouth 2 (two) times daily. 180 tablet 3    enalapril (VASOTEC) 20 MG tablet Take 1 tablet (20 mg total) by mouth 2 (two) times daily. 180 tablet 3    fluticasone propionate (FLONASE) 50 mcg/actuation nasal spray Use 2 sprays to each nostril daily 16 g 0    hydroCHLOROthiazide 12.5 MG Tab Take 1 tablet (12.5 mg total) by mouth every morning. 90 tablet 3    insulin lispro (HUMALOG KWIKPEN INSULIN) 100 unit/mL pen Inject 5 units before breakfast, 12 units before lunch, 22 units before dinner (39 units per day) 30 mL 1    Lactobacillus rhamnosus GG (CULTURELLE) 10 billion cell capsule Take 1 capsule by mouth once daily.      levocetirizine (XYZAL) 5 MG tablet Take 5 mg by mouth every evening.      levothyroxine (SYNTHROID) 200 MCG tablet Take 1 tablet (200 mcg total) by mouth before breakfast. 90 tablet 3    levothyroxine (SYNTHROID) 50 MCG tablet Take 1 tablet (50 mcg total) by mouth before breakfast. 90 tablet 3    metFORMIN (GLUCOPHAGE-XR) 500 MG ER 24hr tablet TAKE 2 TABLETS BY MOUTH TWICE A DAY WITH MEALS 360 tablet 3    mupirocin (BACTROBAN) 2  "% ointment Apply pea sized amount to inside of the right ear twice daily for 10 days 22 g 1    ondansetron (ZOFRAN-ODT) 4 MG TbDL Dissolve 1 tablet (4 mg total) by mouth every 8 (eight) hours as needed (nausea/vomiting). 30 tablet 0    pen needle, diabetic 32 gauge x 5/32" Ndle TO BE USE WITH INSULIN  FOUR TIMES DAILY 100 each 11    rosuvastatin (CRESTOR) 5 MG tablet Take 1 tablet (5 mg total) by mouth once daily. 90 tablet 3     No current facility-administered medications on file prior to visit.       Review of Systems   12 point review of systems negative except for listed in HPI.     Objective:    Nursing note and vitals reviewed.  Vitals:    01/27/25 1637   BP: 134/86   Pulse:    Resp:    Temp:      Body mass index is 46.81 kg/m².     Physical Exam   Constitutional: oriented to person, place, and time. well-developed and well-nourished. No distress.   HENT: WNL  Head: Normocephalic and atraumatic.   Eyes: EOM are normal.   Neck: Normal range of motion. Neck supple.   Cardiovascular: Normal rate  Pulmonary/Chest: Effort normal. No respiratory distress.   GI: soft, non distended, no ttp, no rebound/guarding  Musculoskeletal: Normal range of motion. no edema.   Neurological: CN II-XII intact  Skin: warm and dry.   Psychiatric: normal mood and affect. behavior is normal.   Physical Exam                    We Offer Telehealth & Same Day Appointments!   Book your Telehealth appointment with me through my nurse or   Clinic appointments on [employer]!  [phone]     To Schedule appointments online, go to mAPPn: [email or web address]       Visit today included increased complexity associated with the care of the episodic problem addressed and managing the longitudinal care of the patient due to the serious and/or complex managed problem(s) as per problem list.     This note was generated with the assistance of ambient listening technology. Verbal consent was obtained by the patient and " accompanying visitor(s) for the recording of patient appointment to facilitate this note. I attest to having reviewed and edited the generated note for accuracy, though some syntax or spelling errors may persist. Please contact the author of this note for any clarification.

## 2025-01-29 ENCOUNTER — PATIENT MESSAGE (OUTPATIENT)
Dept: PSYCHIATRY | Facility: CLINIC | Age: 30
End: 2025-01-29
Payer: COMMERCIAL

## 2025-01-30 ENCOUNTER — TELEPHONE (OUTPATIENT)
Dept: FAMILY MEDICINE | Facility: CLINIC | Age: 30
End: 2025-01-30
Payer: COMMERCIAL

## 2025-01-30 DIAGNOSIS — B37.9 YEAST INFECTION: Primary | ICD-10-CM

## 2025-01-30 RX ORDER — FLUCONAZOLE 150 MG/1
150 TABLET ORAL
Qty: 2 TABLET | Refills: 0 | Status: SHIPPED | OUTPATIENT
Start: 2025-01-30 | End: 2025-02-03

## 2025-02-17 ENCOUNTER — RESULTS FOLLOW-UP (OUTPATIENT)
Dept: RHEUMATOLOGY | Facility: CLINIC | Age: 30
End: 2025-02-17

## 2025-03-07 ENCOUNTER — E-VISIT (OUTPATIENT)
Dept: URGENT CARE | Facility: CLINIC | Age: 30
End: 2025-03-07
Payer: COMMERCIAL

## 2025-03-07 DIAGNOSIS — R06.2 WHEEZING: ICD-10-CM

## 2025-03-07 DIAGNOSIS — J01.00 SUBACUTE MAXILLARY SINUSITIS: ICD-10-CM

## 2025-03-07 DIAGNOSIS — R05.2 SUBACUTE COUGH: Primary | ICD-10-CM

## 2025-03-07 RX ORDER — PROMETHAZINE HYDROCHLORIDE AND DEXTROMETHORPHAN HYDROBROMIDE 6.25; 15 MG/5ML; MG/5ML
5 SYRUP ORAL EVERY 6 HOURS PRN
Qty: 118 ML | Refills: 1 | Status: SHIPPED | OUTPATIENT
Start: 2025-03-07 | End: 2025-03-17

## 2025-03-07 RX ORDER — DOXYCYCLINE HYCLATE 100 MG
100 TABLET ORAL 2 TIMES DAILY
Qty: 14 TABLET | Refills: 0 | Status: SHIPPED | OUTPATIENT
Start: 2025-03-07 | End: 2025-03-14

## 2025-03-07 RX ORDER — METHYLPREDNISOLONE 4 MG/1
TABLET ORAL
Qty: 21 TABLET | Refills: 0 | Status: SHIPPED | OUTPATIENT
Start: 2025-03-07

## 2025-03-07 NOTE — PROGRESS NOTES
Patient ID: Candida Barker is a 29 y.o. female.    Chief Complaint: General Illness (Entered automatically based on patient selection in [employer].)          274}  The patient initiated a request through [employer] on 3/7/2025 for evaluation and management with a chief complaint of General Illness (Entered automatically based on patient selection in [employer].)     I evaluated the questionnaire submission on 03/07/2025 .    Total Time (in minutes): 12     [facility] Peq Evisit Supergroup-Cough And Cold    3/7/2025  7:59 AM CST - Filed by Patient   What do you need help with? Cough, Cold, Sore Throat   Do you agree to participate in an E-Visit? Yes   If you have any of the following symptoms, go to your local emergency room or call 911: I acknowledge   Do you have any of the following pregnancy-related conditions? None   What is the main issue you would like addressed today? Chest congestion, wheezing, cough   Do you think you might have COVID-19 or the Flu? No   Have you tested positive for COVID-19 or Flu? No   What symptoms do you currently have?  Cough   Describe your cough: Does not stop;  Interferes with daily activities   Have you ever smoked? Never smoked   Have you had a fever? No   When did your symptoms first appear? 3/5/2025   In the last two weeks, have you been in close contact with someone who has COVID-19, the Flu, or strep throat? No   List what you have done or taken to help your symptoms. Mucinex, coricidin HBP   On a scale of 1-10, where 10 is the worst you can imagine, how severe are your symptoms? (range: 1 - 10) 4   Have your symptoms changed since they first started? Worsened   Do you have transportation to an [facility] location to get tested for COVID-19? Yes   Provide any additional information you feel is important. Feel the cough from deep in chest   Please attach any relevant images or files    Are you able to take your vital signs? Yes   Systolic Blood Pressure: 128   Diastolic Blood Pressure: 78   Weight:  303   Height: 65   Pulse: 82   Temperature: 98.7   Respiration rate: 15   Pulse Oxygen: 99          Active Problem List with Overview Notes    Diagnosis Date Noted    Psoriatic arthritis 01/27/2025    Acute pain of right shoulder 06/20/2024     acute onset  - prn Robaxin  - prn tylenol, heat/ice therapy, stretches   Prn rtc         Plaque psoriasis 05/09/2024    KEIRA (obstructive sleep apnea) 01/09/2024      Home sleep study      Morbid obesity 05/17/2018     Body mass index is 46.81 kg/m². Morbid obesity complicates all aspects of disease management from diagnostic modalities to treatment. Weight loss encouraged and health benefits explained to patient.         Essential hypertension 04/21/2017     -at goal today  - Current Hypertension Medications:   Hypertension Medications               carvediloL (COREG) 12.5 MG tablet Take 1 tablet (12.5 mg total) by mouth 2 (two) times daily.    enalapril (VASOTEC) 20 MG tablet Take 20 mg by mouth 2 (two) times daily.    hydroCHLOROthiazide (HYDRODIURIL) 12.5 MG Tab Take 1 tablet (12.5 mg total) by mouth every morning.          -continue lifestyle modification with low sodium diet and exercise   -discussed hypertension disease course and importance of treating high blood pressure  -patient understood and advised of risk of untreated blood pressure.  ER precautions were given   for symptoms of hypertensive urgency and emergency.        Hyperlipidemia 04/21/2017     -chronic condition. Currently stable.      Hyperlipidemia Medications               rosuvastatin (CRESTOR) 5 MG tablet Take 1 tablet (5 mg total) by mouth once daily.              Lab Results   Component Value Date    CHOL 231 (H) 03/20/2024    CHOL 182 05/16/2023    CHOL 201 (H) 02/17/2023     Lab Results   Component Value Date    HDL 43 03/20/2024    HDL 35 05/16/2023    HDL 40 02/17/2023     Lab Results   Component Value Date    LDLCALC 150.8 03/20/2024    LDLCALC 125 (H) 05/16/2023    LDLCALC 139 (H) 02/17/2023      Lab Results   Component Value Date    TRIG 186 (H) 03/20/2024    TRIG 110 05/16/2023    TRIG 109 02/17/2023     Lab Results   Component Value Date    CHOLHDL 18.6 (L) 03/20/2024    CHOLHDL 5.2 (H) 05/16/2023    CHOLHDL 5.0 (H) 02/17/2023          The ASCVD Risk score (Gladis BRANHAM, et al., 2019) failed to calculate for the following reasons:    The 2019 ASCVD risk score is only valid for ages 40 to 79        Postablative hypothyroidism 04/21/2017     S/p ablation  Chronic hx; stable on synthroid  - denies symptoms   Lab Results   Component Value Date    TSH 0.904 03/20/2024       - cont current dose as rxd      Type 2 diabetes mellitus with hyperglycemia, with long-term current use of insulin 04/21/2017     Well controlled   Lab Results   Component Value Date    HGBA1C 6.5 (H) 09/16/2024       Hypoglycemic Events: none     -current meds:   Diabetes Medications               insulin lispro (HUMALOG KWIKPEN INSULIN) 100 unit/mL pen Inject 5 units before breakfast, 12 units before lunch, 22 units before dinner (39 units per day)    metFORMIN (GLUCOPHAGE-XR) 500 MG ER 24hr tablet TAKE 2 TABLETS BY MOUTH TWICE A DAY WITH MEALS    tirzepatide 15 mg/0.5 mL PnIj Inject 15 mg into the skin every 7 days.    TOUJEO MAX U-300 SOLOSTAR 300 unit/mL (3 mL) insulin pen Inject 60 Units into the skin once daily.          -on statin:   Hyperlipidemia Medications               rosuvastatin (CRESTOR) 5 MG tablet Take 1 tablet (5 mg total) by mouth once daily.          -on ACE-I/ARB:   Hypertension Medications               carvediloL (COREG) 12.5 MG tablet Take 1 tablet (12.5 mg total) by mouth 2 (two) times daily.    enalapril (VASOTEC) 20 MG tablet Take 1 tablet (20 mg total) by mouth 2 (two) times daily.    hydroCHLOROthiazide 12.5 MG Tab Take 1 tablet (12.5 mg total) by mouth every morning.          -counseling provided on importance of diabetic diet and medication compliance in order to treat diabetes  -discussed diabetes disease  course and potential complications  Follow up 3 months           Recent Labs Obtained:  Lab Results   Component Value Date    WBC 7.48 01/25/2025    HGB 12.9 01/25/2025    HCT 41.7 01/25/2025    MCV 84 01/25/2025     01/25/2025     01/25/2025    K 3.7 01/25/2025     (H) 01/25/2025    CREATININE 1.0 01/25/2025    EGFRNORACEVR >60 01/25/2025    HGBA1C 6.5 (H) 09/16/2024    TSH 0.904 03/20/2024      Review of patient's allergies indicates:  No Known Allergies    Encounter Diagnoses   Name Primary?    Subacute maxillary sinusitis     Subacute cough Yes    Wheezing         No orders of the defined types were placed in this encounter.     Medications Ordered This Encounter   Medications    doxycycline (VIBRA-TABS) 100 MG tablet     Sig: Take 1 tablet (100 mg total) by mouth 2 (two) times daily. for 7 days     Dispense:  14 tablet     Refill:  0    methylPREDNISolone (MEDROL DOSEPACK) 4 mg tablet     Sig: follow package directions     Dispense:  21 tablet     Refill:  0    promethazine-dextromethorphan (PROMETHAZINE-DM) 6.25-15 mg/5 mL Syrp     Sig: Take 5 mLs by mouth every 6 (six) hours as needed (cough).     Dispense:  118 mL     Refill:  1        E-Visit Time Tracking:    Day 1 Time (in minutes): 12    Total Time (in minutes): 12      274}

## 2025-03-12 ENCOUNTER — HOSPITAL ENCOUNTER (OUTPATIENT)
Dept: RADIOLOGY | Facility: HOSPITAL | Age: 30
Discharge: HOME | End: 2025-03-12
Attending: STUDENT IN AN ORGANIZED HEALTH CARE EDUCATION/TRAINING PROGRAM
Payer: COMMERCIAL

## 2025-03-12 ENCOUNTER — PATIENT MESSAGE (OUTPATIENT)
Dept: FAMILY MEDICINE | Facility: CLINIC | Age: 30
End: 2025-03-12
Payer: COMMERCIAL

## 2025-03-12 ENCOUNTER — RESULTS FOLLOW-UP (OUTPATIENT)
Dept: FAMILY MEDICINE | Facility: CLINIC | Age: 30
End: 2025-03-12

## 2025-03-12 DIAGNOSIS — R05.1 ACUTE COUGH: Primary | ICD-10-CM

## 2025-03-12 DIAGNOSIS — R05.1 ACUTE COUGH: ICD-10-CM

## 2025-03-12 PROCEDURE — 71046 X-RAY EXAM CHEST 2 VIEWS: CPT | Mod: 26,,, | Performed by: STUDENT IN AN ORGANIZED HEALTH CARE EDUCATION/TRAINING PROGRAM

## 2025-03-12 PROCEDURE — 71046 X-RAY EXAM CHEST 2 VIEWS: CPT | Mod: TC,PO

## 2025-03-12 NOTE — TELEPHONE ENCOUNTER
Orders Placed This Encounter   Procedures    X-Ray Chest PA And Lateral     Standing Status:   Future     Expected Date:   3/12/2025     Expiration Date:   3/12/2026     May the Radiologist modify the order per protocol to meet the clinical needs of the patient?:   Yes     Release to patient:   Immediate

## 2025-03-13 ENCOUNTER — PATIENT MESSAGE (OUTPATIENT)
Dept: RHEUMATOLOGY | Facility: CLINIC | Age: 30
End: 2025-03-13
Payer: COMMERCIAL

## 2025-03-21 ENCOUNTER — OFFICE VISIT (OUTPATIENT)
Dept: OPTOMETRY | Facility: CLINIC | Age: 30
End: 2025-03-21
Payer: COMMERCIAL

## 2025-03-21 DIAGNOSIS — E11.9 TYPE 2 DIABETES MELLITUS WITHOUT RETINOPATHY: Primary | ICD-10-CM

## 2025-03-21 DIAGNOSIS — H47.092 DISORDER OF OPTIC NERVE OF LEFT EYE: ICD-10-CM

## 2025-03-21 DIAGNOSIS — H52.7 REFRACTIVE ERROR: ICD-10-CM

## 2025-03-21 PROCEDURE — 99999 PR PBB SHADOW E&M-EST. PATIENT-LVL IV: CPT | Mod: PBBFAC,,, | Performed by: OPTOMETRIST

## 2025-03-21 NOTE — PROGRESS NOTES
HPI    Pt presents today as a new pt for a dfe.  Pts last ocular exam was last yr w/ Dr. Ana Paula Guzman @ [facility].  Pt was told her OS optic nerve was swollen, to monitor.  Pt wears gls.  Does not instill gtts.  Denies ocular pain/disc.  Denies new or worsening f/f.      Hemoglobin A1C       Date                     Value               Ref Range             Status                09/16/2024               6.5 (H)             4.0 - 5.6 %           Final              Comment:    ADA Screening Guidelines:  5.7-6.4%  Consistent with   prediabetes  >or=6.5%  Consistent with diabetes    High levels of fetal   hemoglobin interfere with the HbA1C  assay. Heterozygous hemoglobin   variants (HbS, HgC, etc)do  not significantly interfere with this assay.     However, presence of multiple variants may affect accuracy.         06/21/2024               7.1 (H)             4.0 - 5.6 %           Final              Comment:    ADA Screening Guidelines:  5.7-6.4%  Consistent with   prediabetes  >or=6.5%  Consistent with diabetes    High levels of fetal   hemoglobin interfere with the HbA1C  assay. Heterozygous hemoglobin   variants (HbS, HgC, etc)do  not significantly interfere with this assay.     However, presence of multiple variants may affect accuracy.         03/20/2024               9.4 (H)             4.0 - 5.6 %           Final              Comment:    ADA Screening Guidelines:  5.7-6.4%  Consistent with   prediabetes  >or=6.5%  Consistent with diabetes    High levels of fetal   hemoglobin interfere with the HbA1C  assay. Heterozygous hemoglobin   variants (HbS, HgC, etc)do  not significantly interfere with this assay.     However, presence of multiple variants may affect accuracy.    ----------    Last edited by Lelo Weinberg on 3/21/2025  1:55 PM.            Assessment /Plan     For exam results, see Encounter Report.    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 Return in 1 year for dilated eye exam.  2. No findings today, prev diagnosis at outside practice, was told to monitor, no treatment.  3. New Spectacle Rx given, discussed different options for glasses. RTC 1 year routine eye exam.

## 2025-04-16 ENCOUNTER — PATIENT MESSAGE (OUTPATIENT)
Dept: ADMINISTRATIVE | Facility: OTHER | Age: 30
End: 2025-04-16
Payer: COMMERCIAL

## 2025-04-17 ENCOUNTER — PATIENT MESSAGE (OUTPATIENT)
Dept: ORTHOPEDICS | Facility: CLINIC | Age: 30
End: 2025-04-17
Payer: COMMERCIAL

## 2025-04-22 ENCOUNTER — PATIENT OUTREACH (OUTPATIENT)
Dept: ADMINISTRATIVE | Facility: HOSPITAL | Age: 30
End: 2025-04-22
Payer: COMMERCIAL

## 2025-04-25 ENCOUNTER — HOSPITAL ENCOUNTER (OUTPATIENT)
Dept: RADIOLOGY | Facility: HOSPITAL | Age: 30
Discharge: HOME | End: 2025-04-25
Attending: OBSTETRICS & GYNECOLOGY
Payer: COMMERCIAL

## 2025-04-25 ENCOUNTER — RESULTS FOLLOW-UP (OUTPATIENT)
Dept: OBSTETRICS AND GYNECOLOGY | Facility: CLINIC | Age: 30
End: 2025-04-25

## 2025-04-25 ENCOUNTER — OFFICE VISIT (OUTPATIENT)
Dept: OBSTETRICS AND GYNECOLOGY | Facility: CLINIC | Age: 30
End: 2025-04-25
Payer: COMMERCIAL

## 2025-04-25 VITALS
DIASTOLIC BLOOD PRESSURE: 84 MMHG | SYSTOLIC BLOOD PRESSURE: 130 MMHG | WEIGHT: 293 LBS | BODY MASS INDEX: 47.09 KG/M2 | HEIGHT: 66 IN

## 2025-04-25 DIAGNOSIS — R10.2 PELVIC PAIN IN FEMALE: ICD-10-CM

## 2025-04-25 DIAGNOSIS — R10.2 PELVIC PAIN IN FEMALE: Primary | ICD-10-CM

## 2025-04-25 LAB
B-HCG UR QL: NEGATIVE
BILIRUB SERPL-MCNC: NEGATIVE MG/DL
BLOOD URINE, POC: NEGATIVE
CLARITY, POC UA: CLEAR
COLOR, POC UA: YELLOW
CTP QC/QA: YES
GLUCOSE UR QL STRIP: NEGATIVE
KETONES UR QL STRIP: NEGATIVE
LEUKOCYTE ESTERASE URINE, POC: NEGATIVE
NITRITE, POC UA: NEGATIVE
PH, POC UA: 5.5
PROTEIN, POC: NEGATIVE
SPECIFIC GRAVITY, POC UA: 1.02
UROBILINOGEN, POC UA: 0.2

## 2025-04-25 PROCEDURE — 76856 US EXAM PELVIC COMPLETE: CPT | Mod: TC,PN

## 2025-04-25 PROCEDURE — 76830 TRANSVAGINAL US NON-OB: CPT | Mod: 26,,, | Performed by: RADIOLOGY

## 2025-04-25 PROCEDURE — 99999 PR PBB SHADOW E&M-EST. PATIENT-LVL IV: CPT | Mod: PBBFAC,,, | Performed by: OBSTETRICS & GYNECOLOGY

## 2025-04-25 PROCEDURE — 76856 US EXAM PELVIC COMPLETE: CPT | Mod: 26,,, | Performed by: RADIOLOGY

## 2025-04-25 NOTE — PROGRESS NOTES
"Chief Complaint   Patient presents with    Pelvic Pain     For last two weeks       History of Present Illness   30 y.o. F patient presents today for pelvic pain for the last two weeks.     Constant, Suprapubic, aching,   No better with ibuprofen  No alleviating  Possibly worse with food  Denies GI issues, Urinary issues, vaginal discharge, itching, odor  She is sexually active, denies new partner.     Past medical and surgical history reviewed.   I have reviewed the patient's medical history in detail and updated the computerized patient record.  Review of patient's allergies indicates:  No Known Allergies    Review of Systems  Constitutional: negative for chills and fatigue  Gastrointestinal: negative for abdominal pain, constipation, diarrhea, nausea and vomiting  Genitourinary:negative for abnormal menstrual periods, genital lesions and vaginal discharge, dysuria, frequency and hematuria    Physical Examination:  /84   Ht 5' 6" (1.676 m)   Wt (!) 140.8 kg (310 lb 6.5 oz)   LMP 03/31/2025 (Exact Date)   BMI 50.10 kg/m²    Physical Exam:   Constitutional: She appears well-developed and well-nourished. No distress.             Abdominal: Soft. There is no abdominal tenderness.     Genitourinary:    Inguinal canal, vagina, uterus, right adnexa and left adnexa normal.   The external female genitalia was normal.     Labial bartholins normal.Cervix is normal.    Genitourinary Comments: Slight vaginal odor             Musculoskeletal: No tenderness or edema.           Assessment/Plan:  Pelvic pain in female  -     Vaginosis Screen by DNA Probe  -     C. trachomatis/N. gonorrhoeae by AMP DNA [facility]; Cervicovaginal  -     US Pelvis Comp with Transvag NON-OB (xpd; Future; Expected date: 04/25/2025  -     POCT urine dipstick without microscope  -     POCT urine pregnancy      Pelvic pain work up ordered. Advised I cannot rule out appenidicits in the office, if severe pain report to the ER.             "

## 2025-04-28 ENCOUNTER — PATIENT OUTREACH (OUTPATIENT)
Dept: ADMINISTRATIVE | Facility: HOSPITAL | Age: 30
End: 2025-04-28
Payer: COMMERCIAL

## 2025-04-28 ENCOUNTER — OFFICE VISIT (OUTPATIENT)
Dept: FAMILY MEDICINE | Facility: CLINIC | Age: 30
End: 2025-04-28
Payer: COMMERCIAL

## 2025-04-28 VITALS
SYSTOLIC BLOOD PRESSURE: 136 MMHG | HEIGHT: 66 IN | BODY MASS INDEX: 47.09 KG/M2 | WEIGHT: 293 LBS | RESPIRATION RATE: 19 BRPM | HEART RATE: 81 BPM | DIASTOLIC BLOOD PRESSURE: 79 MMHG | OXYGEN SATURATION: 98 % | TEMPERATURE: 99 F

## 2025-04-28 DIAGNOSIS — R10.2 PELVIC PAIN: ICD-10-CM

## 2025-04-28 DIAGNOSIS — R10.30 LOWER ABDOMINAL PAIN: Primary | ICD-10-CM

## 2025-04-28 PROCEDURE — 1160F RVW MEDS BY RX/DR IN RCRD: CPT | Mod: CPTII,S$GLB,, | Performed by: PHYSICIAN ASSISTANT

## 2025-04-28 PROCEDURE — 99999 PR PBB SHADOW E&M-EST. PATIENT-LVL V: CPT | Mod: PBBFAC,,, | Performed by: PHYSICIAN ASSISTANT

## 2025-04-28 PROCEDURE — 1159F MED LIST DOCD IN RCRD: CPT | Mod: CPTII,S$GLB,, | Performed by: PHYSICIAN ASSISTANT

## 2025-04-28 PROCEDURE — 3008F BODY MASS INDEX DOCD: CPT | Mod: CPTII,S$GLB,, | Performed by: PHYSICIAN ASSISTANT

## 2025-04-28 PROCEDURE — 3075F SYST BP GE 130 - 139MM HG: CPT | Mod: CPTII,S$GLB,, | Performed by: PHYSICIAN ASSISTANT

## 2025-04-28 PROCEDURE — 99214 OFFICE O/P EST MOD 30 MIN: CPT | Mod: S$GLB,,, | Performed by: PHYSICIAN ASSISTANT

## 2025-04-28 PROCEDURE — 4010F ACE/ARB THERAPY RXD/TAKEN: CPT | Mod: CPTII,S$GLB,, | Performed by: PHYSICIAN ASSISTANT

## 2025-04-28 PROCEDURE — 3078F DIAST BP <80 MM HG: CPT | Mod: CPTII,S$GLB,, | Performed by: PHYSICIAN ASSISTANT

## 2025-04-28 RX ORDER — ACETAMINOPHEN AND CODEINE PHOSPHATE 300; 30 MG/1; MG/1
1 TABLET ORAL EVERY 6 HOURS PRN
Qty: 20 TABLET | Refills: 0 | Status: SHIPPED | OUTPATIENT
Start: 2025-04-28 | End: 2025-05-08

## 2025-04-28 NOTE — PROGRESS NOTES
Assessment/Plan:    1. Lower abdominal pain  -     CT Abdomen Pelvis W Wo Contrast; Future; Expected date: 04/28/2025  -     acetaminophen-codeine 300-30mg (TYLENOL #3) 300-30 mg Tab; Take 1 tablet by mouth every 6 (six) hours as needed (pain).  Dispense: 20 tablet; Refill: 0    2. Pelvic pain  -     CT Abdomen Pelvis W Wo Contrast; Future; Expected date: 04/28/2025  -     acetaminophen-codeine 300-30mg (TYLENOL #3) 300-30 mg Tab; Take 1 tablet by mouth every 6 (six) hours as needed (pain).  Dispense: 20 tablet; Refill: 0    -lower abdominal/pelvic pain >2 weeks  -no alarm symptoms or signs present on exam  -recent pelvic US-probable physiologic cysts in both ovaries including a 2 cm hemorrhagic cyst in the left ovary  -will obtain CT abd/pelvis w/ and w/o contrast  -labs scheduled tomorrow per PCP  -will Rx short course of PRN Tylenol #3 for severe symptoms only. The patient was checked in the [facility] Board of Pharmacy's Prescription Monitoring Program. There appears to be no incongruities with the patient's verbalized history.   -ER precautions for severe or worsening of symptoms    Follow up if symptoms worsen or fail to improve.    Misa Hamilton PA-C  _____________________________________________________________________________________________________________________________________________________    CC: lower abdominal and pelvic pain    HPI: Patient is in clinic today as an established patient here for lower abdominal and pelvic pain.     HISTORY OF PRESENT ILLNESS:  She reports constant pelvic and lower abdominal pain with occasional upper abdominal pain for 2.5 weeks. At onset, she experienced a fever of 101°F for one day with no recurrence. She notes possible association between pain and food intake. She denies any current fever, chills, nausea, vomiting, diarrhea, constipation or bloody/black stool. She also denies dysuria, hematuria, urgency, frequency, flank pain or vaginal symptoms. Last  menstrual period was March 31st. Recent pelvic ultrasound per GYN (Dr. Montgomery) showed bilateral ovarian cysts, including a 2cm hemorrhagic cyst in the left ovary. Urinalysis and pregnancy test were negative. Chlamydia/gonorrhea testing and vaginosis screen pending results. Symptoms unlikely to be related to the cyst per Dr. Montgomery. She has been taking an old prescription of Tylenol #3 with some temporary relief of symptoms.     LABS:  Blood work scheduled tomorrow with Dr. Yeager.     No other complaints today.     Past Medical History:   Diagnosis Date    DM (diabetes mellitus), type 2     HTN (hypertension)     Hypothyroidism, unspecified     Mixed hyperlipidemia     Obesity, unspecified     Polycystic ovaries 2024     Past Surgical History:   Procedure Laterality Date    CHOLECYSTECTOMY  12/2023    ganglion cystectomy Right     wrist twice     Review of patient's allergies indicates:  No Known Allergies  Social History[1]  Family History   Problem Relation Name Age of Onset    Breast cancer Maternal Grandmother Keysha     Cancer Maternal Grandmother Keysha     Vision loss Maternal Grandmother Keysha     Diabetes Maternal Grandfather Pablo     Drug abuse Father Israel     Hypertension Father [place]     Diabetes Mother Beata     Early death Mother Beata     Hypertension Mother [place]     Miscarriages / Stillbirths Mother [place]     Asthma Maternal Aunt Francisca     Hypertension Maternal Aunt Francisca     Stroke Maternal Aunt Francisca     Diabetes Maternal Aunt Kely     Amblyopia Neg Hx      Blindness Neg Hx      Retinal detachment Neg Hx      Ovarian cancer Neg Hx       Medications Ordered Prior to Encounter[2]    Review of Systems   Constitutional:  Negative for chills, diaphoresis, fatigue and fever.   HENT:  Negative for congestion, ear pain, postnasal drip, sinus pain and sore throat.    Eyes:  Negative for pain and redness.   Respiratory:  Negative for cough, chest tightness and shortness of breath.    Cardiovascular:   "Negative for chest pain and leg swelling.   Gastrointestinal:  Positive for abdominal pain. Negative for constipation, diarrhea, nausea and vomiting.   Genitourinary:  Positive for pelvic pain. Negative for difficulty urinating, dyspareunia, dysuria, flank pain, frequency, hematuria, menstrual problem, urgency, vaginal bleeding, vaginal discharge and vaginal pain.   Musculoskeletal:  Negative for arthralgias and joint swelling.   Skin:  Negative for rash.   Neurological:  Negative for dizziness, syncope and headaches.   Psychiatric/Behavioral:  Negative for dysphoric mood. The patient is not nervous/anxious.        Vitals:    04/28/25 1008   BP: 136/79   BP Location: Right arm   Patient Position: Sitting   Pulse: 81   Resp: 19   Temp: 98.5 °F (36.9 °C)   TempSrc: Temporal   SpO2: 98%   Weight: 135.2 kg (298 lb)   Height: 5' 6" (1.676 m)       Wt Readings from Last 3 Encounters:   04/28/25 135.2 kg (298 lb)   04/25/25 (!) 140.8 kg (310 lb 6.5 oz)   01/27/25 131.5 kg (290 lb)       Physical Exam  Constitutional:       General: She is not in acute distress.     Appearance: Normal appearance. She is well-developed.   HENT:      Head: Normocephalic and atraumatic.   Eyes:      Conjunctiva/sclera: Conjunctivae normal.   Cardiovascular:      Rate and Rhythm: Normal rate and regular rhythm.      Pulses: Normal pulses.      Heart sounds: Normal heart sounds. No murmur heard.  Pulmonary:      Effort: Pulmonary effort is normal. No respiratory distress.      Breath sounds: Normal breath sounds.   Abdominal:      General: Bowel sounds are normal. There is no distension.      Palpations: Abdomen is soft.      Tenderness: There is abdominal tenderness (lower abdominal/pelvic region). There is no right CVA tenderness, left CVA tenderness, guarding or rebound.   Musculoskeletal:         General: Normal range of motion.      Cervical back: Normal range of motion and neck supple.   Skin:     General: Skin is warm and dry.      " Findings: No rash.   Neurological:      General: No focal deficit present.      Mental Status: She is alert and oriented to person, place, and time.   Psychiatric:         Mood and Affect: Mood normal.         Behavior: Behavior normal.         Health Maintenance   Topic Date Due    COVID-19 Vaccine (3 - 2024-25 season) 09/01/2024    Foot Exam  11/10/2024    Hemoglobin A1c  03/16/2025    Diabetes Urine Screening  03/20/2025    Lipid Panel  09/16/2025    Diabetic Eye Exam  03/21/2026    Cervical Cancer Screening  07/02/2027    TETANUS VACCINE  10/14/2032    RSV Vaccine (Age 60+ and Pregnant patients) (1 - 1-dose 75+ series) 04/21/2070    Hepatitis C Screening  Completed    Influenza Vaccine  Completed    HIV Screening  Completed    Pneumococcal Vaccines (Age 0-49)  Completed     DISCLAIMER: This note was compiled by using a speech recognition dictation system and therefore please be aware that typographical / speech recognition errors can and do occur.  Please contact me if you see any errors specifically.  Consent was obtained for [employer] recording system prior to the visit.         [1]   Social History  Tobacco Use    Smoking status: Never    Smokeless tobacco: Never   Substance Use Topics    Alcohol use: Yes     Alcohol/week: 1.0 standard drink of alcohol     Types: 1 Glasses of wine per week     Comment: rarely    Drug use: Never   [2]   Current Outpatient Medications on File Prior to Visit   Medication Sig Dispense Refill    ALPRAZolam (XANAX) 0.5 MG tablet Take 1 tablet (0.5 mg total) by mouth nightly as needed for Anxiety. 30 tablet 1    apremilast (OTEZLA) 30 mg Tab Take 1 tablet (30 mg total) by mouth 2 (two) times daily. 90 tablet 1    aspirin (ECOTRIN) 81 MG EC tablet       blood-glucose sensor (DEXCOM G6 SENSOR) Ernestine Apply and change every 10 days 3 each 11    blood-glucose transmitter (DEXCOM G6 TRANSMITTER) Ernestine Use as directed every 90 days 1 each 3    carvediloL (COREG) 12.5 MG tablet Take 1 tablet  "(12.5 mg total) by mouth 2 (two) times daily. 180 tablet 3    enalapril (VASOTEC) 20 MG tablet Take 1 tablet (20 mg total) by mouth 2 (two) times daily. 180 tablet 3    FLUoxetine 20 MG capsule Take 3 capsules (60 mg total) by mouth once daily. 180 capsule 3    hydroCHLOROthiazide 12.5 MG Tab Take 1 tablet (12.5 mg total) by mouth every morning. 90 tablet 3    hydrOXYzine HCL (ATARAX) 25 MG tablet Take 1 tablet (25 mg total) by mouth 3 (three) times daily as needed for Itching. 180 tablet 3    insulin lispro (HUMALOG KWIKPEN INSULIN) 100 unit/mL pen Inject 5 units before breakfast, 12 units before lunch, 22 units before dinner (39 units per day) 30 mL 1    Lactobacillus rhamnosus GG (CULTURELLE) 10 billion cell capsule Take 1 capsule by mouth once daily.      levocetirizine (XYZAL) 5 MG tablet Take 5 mg by mouth every evening.      levothyroxine (SYNTHROID) 200 MCG tablet Take 1 tablet (200 mcg total) by mouth before breakfast. 90 tablet 3    levothyroxine (SYNTHROID) 50 MCG tablet Take 1 tablet (50 mcg total) by mouth before breakfast. 90 tablet 3    metFORMIN (GLUCOPHAGE-XR) 500 MG ER 24hr tablet TAKE 2 TABLETS BY MOUTH TWICE A DAY WITH MEALS 360 tablet 3    pen needle, diabetic 32 gauge x 5/32" Ndle TO BE USE WITH INSULIN  FOUR TIMES DAILY 100 each 11    rosuvastatin (CRESTOR) 5 MG tablet Take 1 tablet (5 mg total) by mouth once daily. 90 tablet 3    tirzepatide 15 mg/0.5 mL PnIj Inject 15 mg into the skin every 7 days. 4 Pen 6    TOUJEO MAX U-300 SOLOSTAR 300 unit/mL (3 mL) insulin pen Inject 60 Units into the skin once daily.      [DISCONTINUED] blood sugar diagnostic (BLOOD GLUCOSE TEST) Strp Test glucose 1 x daily 50 strip 11    [DISCONTINUED] blood-glucose meter,continuous (DEXCOM ) Misc Use as directed 1 each 0    [DISCONTINUED] fluticasone propionate (FLONASE) 50 mcg/actuation nasal spray Use 2 sprays to each nostril daily 16 g 0    [DISCONTINUED] methylPREDNISolone (MEDROL DOSEPACK) 4 mg tablet " follow package directions 21 tablet 0    [DISCONTINUED] mupirocin (BACTROBAN) 2 % ointment Apply pea sized amount to inside of the right ear twice daily for 10 days 22 g 1    [DISCONTINUED] ondansetron (ZOFRAN-ODT) 4 MG TbDL Dissolve 1 tablet (4 mg total) by mouth every 8 (eight) hours as needed (nausea/vomiting). 30 tablet 0     No current facility-administered medications on file prior to visit.

## 2025-04-29 ENCOUNTER — LAB VISIT (OUTPATIENT)
Dept: LAB | Facility: HOSPITAL | Age: 30
End: 2025-04-29
Attending: STUDENT IN AN ORGANIZED HEALTH CARE EDUCATION/TRAINING PROGRAM
Payer: COMMERCIAL

## 2025-04-29 DIAGNOSIS — E78.2 MIXED HYPERLIPIDEMIA: ICD-10-CM

## 2025-04-29 DIAGNOSIS — E11.65 TYPE 2 DIABETES MELLITUS WITH HYPERGLYCEMIA, WITH LONG-TERM CURRENT USE OF INSULIN: ICD-10-CM

## 2025-04-29 DIAGNOSIS — I10 ESSENTIAL HYPERTENSION: ICD-10-CM

## 2025-04-29 DIAGNOSIS — Z79.4 TYPE 2 DIABETES MELLITUS WITH HYPERGLYCEMIA, WITH LONG-TERM CURRENT USE OF INSULIN: ICD-10-CM

## 2025-04-29 LAB
ABSOLUTE EOSINOPHIL (OHS): 0.13 K/UL
ABSOLUTE MONOCYTE (OHS): 0.34 K/UL (ref 0.3–1)
ABSOLUTE NEUTROPHIL COUNT (OHS): 4.28 K/UL (ref 1.8–7.7)
ALBUMIN SERPL BCP-MCNC: 3.6 G/DL (ref 3.5–5.2)
ALBUMIN/CREAT UR: 5.8 UG/MG
ALP SERPL-CCNC: 77 UNIT/L (ref 40–150)
ALT SERPL W/O P-5'-P-CCNC: 24 UNIT/L (ref 10–44)
ANION GAP (OHS): 11 MMOL/L (ref 8–16)
AST SERPL-CCNC: 26 UNIT/L (ref 11–45)
BASOPHILS # BLD AUTO: 0.07 K/UL
BASOPHILS NFR BLD AUTO: 1 %
BILIRUB SERPL-MCNC: 0.4 MG/DL (ref 0.1–1)
BUN SERPL-MCNC: 9 MG/DL (ref 6–20)
CALCIUM SERPL-MCNC: 8.7 MG/DL (ref 8.7–10.5)
CHLORIDE SERPL-SCNC: 102 MMOL/L (ref 95–110)
CHOLEST SERPL-MCNC: 185 MG/DL (ref 120–199)
CHOLEST/HDLC SERPL: 5.1 {RATIO} (ref 2–5)
CO2 SERPL-SCNC: 23 MMOL/L (ref 23–29)
CREAT SERPL-MCNC: 0.8 MG/DL (ref 0.5–1.4)
CREAT UR-MCNC: 139 MG/DL (ref 15–325)
EAG (OHS): 177 MG/DL (ref 68–131)
ERYTHROCYTE [DISTWIDTH] IN BLOOD BY AUTOMATED COUNT: 13.6 % (ref 11.5–14.5)
GFR SERPLBLD CREATININE-BSD FMLA CKD-EPI: >60 ML/MIN/1.73/M2
GLUCOSE SERPL-MCNC: 109 MG/DL (ref 70–110)
HBA1C MFR BLD: 7.8 % (ref 4–5.6)
HCT VFR BLD AUTO: 37.7 % (ref 37–48.5)
HDLC SERPL-MCNC: 36 MG/DL (ref 40–75)
HDLC SERPL: 19.5 % (ref 20–50)
HGB BLD-MCNC: 11.4 GM/DL (ref 12–16)
IMM GRANULOCYTES # BLD AUTO: 0.04 K/UL (ref 0–0.04)
IMM GRANULOCYTES NFR BLD AUTO: 0.6 % (ref 0–0.5)
LDLC SERPL CALC-MCNC: 122.4 MG/DL (ref 63–159)
LYMPHOCYTES # BLD AUTO: 1.83 K/UL (ref 1–4.8)
MCH RBC QN AUTO: 25.9 PG (ref 27–31)
MCHC RBC AUTO-ENTMCNC: 30.2 G/DL (ref 32–36)
MCV RBC AUTO: 86 FL (ref 82–98)
MICROALBUMIN UR-MCNC: 8 UG/ML (ref ?–5000)
NONHDLC SERPL-MCNC: 149 MG/DL
NUCLEATED RBC (/100WBC) (OHS): 0 /100 WBC
PLATELET # BLD AUTO: 281 K/UL (ref 150–450)
PMV BLD AUTO: 12 FL (ref 9.2–12.9)
POTASSIUM SERPL-SCNC: 3.6 MMOL/L (ref 3.5–5.1)
PROT SERPL-MCNC: 7.4 GM/DL (ref 6–8.4)
RBC # BLD AUTO: 4.4 M/UL (ref 4–5.4)
RELATIVE EOSINOPHIL (OHS): 1.9 %
RELATIVE LYMPHOCYTE (OHS): 27.4 % (ref 18–48)
RELATIVE MONOCYTE (OHS): 5.1 % (ref 4–15)
RELATIVE NEUTROPHIL (OHS): 64 % (ref 38–73)
SODIUM SERPL-SCNC: 136 MMOL/L (ref 136–145)
T4 FREE SERPL-MCNC: 0.51 NG/DL (ref 0.71–1.51)
TRIGL SERPL-MCNC: 133 MG/DL (ref 30–150)
TSH SERPL-ACNC: 41.11 UIU/ML (ref 0.4–4)
WBC # BLD AUTO: 6.69 K/UL (ref 3.9–12.7)

## 2025-04-29 PROCEDURE — 83036 HEMOGLOBIN GLYCOSYLATED A1C: CPT

## 2025-04-29 PROCEDURE — 84439 ASSAY OF FREE THYROXINE: CPT

## 2025-04-29 PROCEDURE — 85025 COMPLETE CBC W/AUTO DIFF WBC: CPT

## 2025-04-29 PROCEDURE — 84443 ASSAY THYROID STIM HORMONE: CPT

## 2025-04-29 PROCEDURE — 36415 COLL VENOUS BLD VENIPUNCTURE: CPT | Mod: PO

## 2025-04-29 PROCEDURE — 84460 ALANINE AMINO (ALT) (SGPT): CPT

## 2025-04-29 PROCEDURE — 80061 LIPID PANEL: CPT

## 2025-04-29 PROCEDURE — 82043 UR ALBUMIN QUANTITATIVE: CPT

## 2025-04-30 ENCOUNTER — HOSPITAL ENCOUNTER (OUTPATIENT)
Dept: RADIOLOGY | Facility: HOSPITAL | Age: 30
Discharge: HOME | End: 2025-04-30
Attending: PHYSICIAN ASSISTANT
Payer: COMMERCIAL

## 2025-04-30 ENCOUNTER — PATIENT MESSAGE (OUTPATIENT)
Dept: FAMILY MEDICINE | Facility: CLINIC | Age: 30
End: 2025-04-30
Payer: COMMERCIAL

## 2025-04-30 ENCOUNTER — RESULTS FOLLOW-UP (OUTPATIENT)
Dept: FAMILY MEDICINE | Facility: CLINIC | Age: 30
End: 2025-04-30

## 2025-04-30 DIAGNOSIS — R10.30 LOWER ABDOMINAL PAIN: ICD-10-CM

## 2025-04-30 DIAGNOSIS — R10.2 PELVIC PAIN: ICD-10-CM

## 2025-04-30 PROCEDURE — 74177 CT ABD & PELVIS W/CONTRAST: CPT | Mod: TC,PN

## 2025-04-30 PROCEDURE — 25500020 PHARM REV CODE 255: Mod: PN | Performed by: PHYSICIAN ASSISTANT

## 2025-04-30 PROCEDURE — 74177 CT ABD & PELVIS W/CONTRAST: CPT | Mod: 26,,, | Performed by: RADIOLOGY

## 2025-04-30 RX ADMIN — IOHEXOL 30 ML: 300 INJECTION, SOLUTION INTRAVENOUS at 04:04

## 2025-04-30 RX ADMIN — IOHEXOL 100 ML: 350 INJECTION, SOLUTION INTRAVENOUS at 04:04

## 2025-04-30 NOTE — PROGRESS NOTES
3m A1c, tsh, cbc, bmp    I have sent a msg to patient with the following interpretation (see below):  A1c above goal. Tsh is high. Continue consistently taking meds as rxd. Recheck in     Cbc mild anemia. Repeat in      Lipids and cmp great!    - Urine microalbumin indicates your kidneys are NOT spilling protein into the urine and are working well. The best way to ensure good kidney function is good blood pressure and blood sugar control.      -Dr. Yeager

## 2025-05-01 ENCOUNTER — RESULTS FOLLOW-UP (OUTPATIENT)
Dept: FAMILY MEDICINE | Facility: CLINIC | Age: 30
End: 2025-05-01

## 2025-05-02 ENCOUNTER — OFFICE VISIT (OUTPATIENT)
Dept: FAMILY MEDICINE | Facility: CLINIC | Age: 30
End: 2025-05-02
Payer: COMMERCIAL

## 2025-05-02 VITALS
DIASTOLIC BLOOD PRESSURE: 81 MMHG | BODY MASS INDEX: 47.09 KG/M2 | OXYGEN SATURATION: 99 % | HEIGHT: 66 IN | RESPIRATION RATE: 18 BRPM | SYSTOLIC BLOOD PRESSURE: 129 MMHG | HEART RATE: 82 BPM | WEIGHT: 293 LBS | TEMPERATURE: 98 F

## 2025-05-02 DIAGNOSIS — L40.9 PSORIASIS OF SCALP: ICD-10-CM

## 2025-05-02 DIAGNOSIS — Z79.4 TYPE 2 DIABETES MELLITUS WITH HYPERGLYCEMIA, WITH LONG-TERM CURRENT USE OF INSULIN: ICD-10-CM

## 2025-05-02 DIAGNOSIS — M25.551 RIGHT HIP PAIN: ICD-10-CM

## 2025-05-02 DIAGNOSIS — I10 ESSENTIAL HYPERTENSION: ICD-10-CM

## 2025-05-02 DIAGNOSIS — E11.65 TYPE 2 DIABETES MELLITUS WITH HYPERGLYCEMIA, WITH LONG-TERM CURRENT USE OF INSULIN: ICD-10-CM

## 2025-05-02 DIAGNOSIS — Z00.00 ANNUAL PHYSICAL EXAM: Primary | ICD-10-CM

## 2025-05-02 DIAGNOSIS — L40.50 PSORIATIC ARTHRITIS: ICD-10-CM

## 2025-05-02 DIAGNOSIS — E89.0 POSTABLATIVE HYPOTHYROIDISM: ICD-10-CM

## 2025-05-02 DIAGNOSIS — D64.9 NORMOCYTIC ANEMIA: ICD-10-CM

## 2025-05-02 DIAGNOSIS — L21.0 DANDRUFF: ICD-10-CM

## 2025-05-02 DIAGNOSIS — E66.01 MORBID OBESITY: ICD-10-CM

## 2025-05-02 DIAGNOSIS — E78.2 MIXED HYPERLIPIDEMIA: ICD-10-CM

## 2025-05-02 PROCEDURE — 99999 PR PBB SHADOW E&M-EST. PATIENT-LVL V: CPT | Mod: PBBFAC,,, | Performed by: STUDENT IN AN ORGANIZED HEALTH CARE EDUCATION/TRAINING PROGRAM

## 2025-05-02 RX ORDER — INSULIN GLARGINE 300 U/ML
30 INJECTION, SOLUTION SUBCUTANEOUS DAILY
Start: 2025-05-02

## 2025-05-02 RX ORDER — CARVEDILOL 12.5 MG/1
12.5 TABLET ORAL 2 TIMES DAILY
Qty: 180 TABLET | Refills: 3 | Status: SHIPPED | OUTPATIENT
Start: 2025-05-02 | End: 2025-07-31

## 2025-05-02 RX ORDER — LEVOTHYROXINE SODIUM 50 UG/1
50 TABLET ORAL
Qty: 90 TABLET | Refills: 3 | Status: SHIPPED | OUTPATIENT
Start: 2025-05-02

## 2025-05-02 RX ORDER — BLOOD-GLUCOSE SENSOR
EACH MISCELLANEOUS
Qty: 3 EACH | Refills: 11 | Status: SHIPPED | OUTPATIENT
Start: 2025-05-02

## 2025-05-02 RX ORDER — HYDROCHLOROTHIAZIDE 12.5 MG/1
12.5 TABLET ORAL EVERY MORNING
Qty: 90 TABLET | Refills: 3 | Status: SHIPPED | OUTPATIENT
Start: 2025-05-02

## 2025-05-02 RX ORDER — LEVOTHYROXINE SODIUM 200 UG/1
200 TABLET ORAL
Qty: 90 TABLET | Refills: 3 | Status: SHIPPED | OUTPATIENT
Start: 2025-05-02

## 2025-05-02 RX ORDER — ENALAPRIL MALEATE 20 MG/1
20 TABLET ORAL 2 TIMES DAILY
Qty: 180 TABLET | Refills: 3 | Status: SHIPPED | OUTPATIENT
Start: 2025-05-02 | End: 2026-04-27

## 2025-05-02 NOTE — TELEPHONE ENCOUNTER
No care due was identified.  Health [facility] Embedded Care Due Messages. Reference number: [id number].   5/02/2025 11:18:33 AM CDT

## 2025-05-02 NOTE — PATIENT INSTRUCTIONS
Nitesh Tirado,     If you are due for any health screening(s) below please notify me so we can arrange them to be ordered and scheduled. Most healthy patients at your age complete them, but you are free to accept or refuse.     If you can't do it, I'll definitely understand. If you can, I'd certainly appreciate it!    All of your core healthy metrics are met.

## 2025-05-02 NOTE — PROGRESS NOTES
Assessment/Plan:    ANNUAL EXAM   patient here for annual exam.    - Labs ordered. Health maintenance was reviewed and ordered. Medications were reviewed and reconciled.  - All questions were answered. Advised of Wellness plan.   - Follow up in 1 year for ANNUAL WELLNESS EXAM    1. Annual physical exam    2. Essential hypertension  Overview:  -at goal today  - Current Hypertension Medications:   Hypertension Medications               carvediloL (COREG) 12.5 MG tablet Take 1 tablet (12.5 mg total) by mouth 2 (two) times daily.    enalapril (VASOTEC) 20 MG tablet Take 20 mg by mouth 2 (two) times daily.    hydroCHLOROthiazide (HYDRODIURIL) 12.5 MG Tab Take 1 tablet (12.5 mg total) by mouth every morning.          -continue lifestyle modification with low sodium diet and exercise   -discussed hypertension disease course and importance of treating high blood pressure  -patient understood and advised of risk of untreated blood pressure.  ER precautions were given   for symptoms of hypertensive urgency and emergency.      Orders:  -     hydroCHLOROthiazide 12.5 MG Tab; Take 1 tablet (12.5 mg total) by mouth every morning.  Dispense: 90 tablet; Refill: 3  -     enalapril (VASOTEC) 20 MG tablet; Take 1 tablet (20 mg total) by mouth 2 (two) times daily.  Dispense: 180 tablet; Refill: 3  -     carvediloL (COREG) 12.5 MG tablet; Take 1 tablet (12.5 mg total) by mouth 2 (two) times daily.  Dispense: 180 tablet; Refill: 3    3. Mixed hyperlipidemia  Overview:  -chronic condition. Currently stable.      Hyperlipidemia Medications               rosuvastatin (CRESTOR) 5 MG tablet Take 1 tablet (5 mg total) by mouth once daily.              Lab Results   Component Value Date    CHOL 185 04/29/2025    CHOL 164 09/16/2024    CHOL 231 (H) 03/20/2024     Lab Results   Component Value Date    HDL 36 (L) 04/29/2025    HDL 44 09/16/2024    HDL 43 03/20/2024     Lab Results   Component Value Date    LDLCALC 122.4 04/29/2025    LDLCALC 109.4  09/16/2024    LDLCALC 150.8 03/20/2024     Lab Results   Component Value Date    TRIG 133 04/29/2025    TRIG 53 09/16/2024    TRIG 186 (H) 03/20/2024     Lab Results   Component Value Date    CHOLHDL 19.5 (L) 04/29/2025    CHOLHDL 26.8 09/16/2024    CHOLHDL 18.6 (L) 03/20/2024          The ASCVD Risk score (Gladis DK, et al., 2019) failed to calculate for the following reasons:    The 2019 ASCVD risk score is only valid for ages 40 to 79        4. Morbid obesity  Overview:  Wt Readings from Last 3 Encounters:   05/02/25 1149 134.3 kg (296 lb)   04/28/25 1008 135.2 kg (298 lb)   04/25/25 0758 (!) 140.8 kg (310 lb 6.5 oz)       Diabetes Medications              insulin lispro (HUMALOG KWIKPEN INSULIN) 100 unit/mL pen Inject 5 units before breakfast, 12 units before lunch, 22 units before dinner (39 units per day)    metFORMIN (GLUCOPHAGE-XR) 500 MG ER 24hr tablet TAKE 2 TABLETS BY MOUTH TWICE A DAY WITH MEALS    tirzepatide 15 mg/0.5 mL PnIj Inject 15 mg into the skin every 7 days.    TOUJEO MAX U-300 SOLOSTAR 300 unit/mL (3 mL) insulin pen Inject 60 Units into the skin once daily.            General weight loss/lifestyle modification strategies discussed:   limit sugary drinks, Informal exercise, exercise 3-5x per week                 5. Type 2 diabetes mellitus with hyperglycemia, with long-term current use of insulin  Overview:  uncontrolled   - better med compliance and routine exercise  -repeat in   Lab Results   Component Value Date    HGBA1C 7.8 (H) 04/29/2025       Hypoglycemic Events: none     -current meds:   Diabetes Medications               insulin lispro (HUMALOG KWIKPEN INSULIN) 100 unit/mL pen Inject 5 units before breakfast, 12 units before lunch, 22 units before dinner (39 units per day)    metFORMIN (GLUCOPHAGE-XR) 500 MG ER 24hr tablet TAKE 2 TABLETS BY MOUTH TWICE A DAY WITH MEALS    tirzepatide 15 mg/0.5 mL PnIj Inject 15 mg into the skin every 7 days.    TOUJEO MAX U-300 SOLOSTAR 300 unit/mL  (3 mL) insulin pen Inject 60 Units into the skin once daily.          -on statin:   Hyperlipidemia Medications               rosuvastatin (CRESTOR) 5 MG tablet Take 1 tablet (5 mg total) by mouth once daily.          -on ACE-I/ARB:   Hypertension Medications               carvediloL (COREG) 12.5 MG tablet Take 1 tablet (12.5 mg total) by mouth 2 (two) times daily.    enalapril (VASOTEC) 20 MG tablet Take 1 tablet (20 mg total) by mouth 2 (two) times daily.    hydroCHLOROthiazide 12.5 MG Tab Take 1 tablet (12.5 mg total) by mouth every morning.          -counseling provided on importance of diabetic diet and medication compliance in order to treat diabetes  -discussed diabetes disease course and potential complications  Follow up 3 months       Orders:  -     TOUJEO MAX U-300 SOLOSTAR 300 unit/mL (3 mL) insulin pen; Inject 30 Units into the skin once daily.    6. Postablative hypothyroidism  Overview:  S/p ablation  Chronic hx  - denies symptoms   Lab Results   Component Value Date    TSH 41.106 (H) 04/29/2025   - better med compliance, 8w repeat    Orders:  -     levothyroxine (SYNTHROID) 200 MCG tablet; Take 1 tablet (200 mcg total) by mouth before breakfast.  Dispense: 90 tablet; Refill: 3  -     levothyroxine (SYNTHROID) 50 MCG tablet; Take 1 tablet (50 mcg total) by mouth before breakfast.  Dispense: 90 tablet; Refill: 3    7. Psoriatic arthritis  -     Ambulatory referral/consult to Rheumatology; Future; Expected date: 05/09/2025    8. Normocytic anemia  -     Iron and TIBC; Future; Expected date: 07/31/2025  -     Reticulocytes; Future; Expected date: 07/31/2025  -     Ferritin; Future; Expected date: 07/31/2025    9. Dandruff  -     Ambulatory referral/consult to Dermatology; Future; Expected date: 05/09/2025    10. Psoriasis of scalp  -     Ambulatory referral/consult to Dermatology; Future; Expected date: 05/09/2025           Assessment & Plan    RIGHT LOWER QUADRANT PAIN AND OVARIAN CYST:  - Evaluated  patient's ongoing pelvic and abdominal pain (2.5 weeks duration), primarily on the right side.  - CT showed hemorrhagic cyst on the left ovary, with otherwise clear GYN workup.  - Considering hip issue as possible cause due to localized pain.  - take acetaminophen as needed.  - Ordered XR Right Hip to be completed at patient's discretion if pain worsens.  - Patient to contact office if hip pain worsens or to schedule XR.    KIDNEY STONES:  - Noted stones present in the left kidney, not currently passing.  - Recommend increasing water intake for prevention.  - Explained that tea, nuts, and dark green vegetables can contribute to kidney stone formation due to high calcium content.    PLEURAL EFFUSION:  - Identified small right and left pleural effusions on CT.   Pt denies chest pain, sob, fever, chills, cough    PSORIASIS:  - Patient reports scalp psoriasis is currently severe and not well controlled, possibly due to inconsistent use of Otezla.  - Continued Otezla at current dose.  - Referred to dermatology (Dr. Gutiérrez) for psoriasis management.    HYPERLIPIDEMIA:  - Noted cholesterol levels are satisfactory with current management.  - Continued rosuvastatin at current dose.    HYPERTENSION:  - Noted blood pressure is optimal with current medication regimen.  - Continued carvedilol, enalapril, hydrochlorothiazide at current doses.    FOLLOW-UP AND ADDITIONAL NOTES:  - Ordered labs in 3 months: CBC, iron studies, A1C, TSH, and BMP.  - Patient to resume taking probiotics to address gut health concerns.  - Referred to rheumatology for follow-up care, as previous provider is leaving practice.  - Follow up in 3 months for lab review.  - GI symptoms likely due to incomplete bowel clearance.           Kaylee Yeager MD  _________________________________________________________________________      Patient ID: Candida Barker is a 30 y.o. female.    Chief Complaint:  Encounter for annual exam    HPI: Patient here for a  comprehensive physical exam.    HISTORY OF PRESENT ILLNESS:  She presents with right-sided pelvic and abdominal pain ongoing for 2.5 weeks. Pain worsens with eating. She experienced one day of fever during this period. She reports sore throat onset yesterday, headache, and worsening psoriasis flare on scalp.    GYNECOLOGIC:  Recent GYN workup with Dr. Montgomery was unremarkable. Her last menstrual period was approximately March 30th with heavy flow during the first few days. CT showed hemorrhagic cyst on left ovary and polycystic ovary syndrome (PCOS).    MEDICAL HISTORY:  CT also showed stones in left kidney without passage and small bilateral pleural effusions.    LABS:  Recent blood donation was declined due to low iron levels, which is notable as she previously donated blood regularly every 6 weeks without issues. Hemoglobin was 11.4 g/dL, below normal range.                 Health Maintenance Topics with due status: Not Due       Topic Last Completion Date    TETANUS VACCINE 10/14/2022    Cervical Cancer Screening 07/02/2024    Diabetic Eye Exam 03/21/2025    Diabetes Urine Screening 04/29/2025    Lipid Panel 04/29/2025    Hemoglobin A1c 04/29/2025    RSV Vaccine (Age 60+ and Pregnant patients) Not Due        ==============================================  History reviewed.   Health Maintenance Due   Topic Date Due    COVID-19 Vaccine (3 - 2024-25 season) 09/01/2024    Foot Exam  11/10/2024       Past Medical History:  Past Medical History:   Diagnosis Date    Carpal tunnel syndrome     DM (diabetes mellitus), type 2     Ganglion cyst     HTN (hypertension)     Hypothyroidism, unspecified     Mixed hyperlipidemia     Obesity, unspecified     Polycystic ovaries 2024    Radiation     Seasonal allergies     Sleep apnea      Past Surgical History:   Procedure Laterality Date    CHOLECYSTECTOMY  12/2023    ganglion cystectomy Right     wrist twice    HAND SURGERY  2006,2008    Ganglion cyst x2 right wrist     Review of  patient's allergies indicates:  No Known Allergies  Medications Ordered Prior to Encounter[1]  Social History[2]  Family History   Problem Relation Name Age of Onset    Breast cancer Maternal Grandmother Keysha     Cancer Maternal Grandmother Keysha     Vision loss Maternal Grandmother Keysha     Diabetes Maternal Grandfather Pablo     Drug abuse Father Israel     Hypertension Father Israel     Diabetes Mother Beata     Early death Mother Sl[place]ell     Hypertension Mother Sl[place]ell     Miscarriages / Stillbirths Mother Beata     Asthma Maternal Aunt Francisca     Hypertension Maternal Aunt Francisca     Stroke Maternal Aunt Francisca     Diabetes Maternal Aunt Kely     Amblyopia Neg Hx      Blindness Neg Hx      Retinal detachment Neg Hx      Ovarian cancer Neg Hx                 Objective:    Nursing note and vitals reviewed.  Vitals:    05/02/25 1149   BP: 129/81   Pulse: 82   Resp: 18   Temp: 98.3 °F (36.8 °C)     Body mass index is 47.78 kg/m².     Physical Exam   Constitutional: oriented to person, place, and time. well-developed and well-nourished. No distress.   HENT: WNL  Head: Normocephalic and atraumatic.   Eyes: EOM are normal.   Neck: Normal range of motion. Neck supple.   Cardiovascular: Normal rate  Pulmonary/Chest: Effort normal. No respiratory distress.   GI: soft, non distended, no ttp, no rebound/guarding  Musculoskeletal: Normal range of motion. no edema.   Mild ttp to R groin. R hip:full rom and strength, no ttp to greater trochanter. Neg chrissie, fadir. normal gait   Neurological: CN II-XII intact  Skin: warm and dry.   Psychiatric: normal mood and affect. behavior is normal.       Bilateral Foot: date completed 5/2025  No foot deformity, corns or callus formation, nails in good condition and well trimmed, no interspace maceration or ulceration noted. Protective sensation intact with 10 gram monofilament.  +2 dorsalis pedis and posterior pulses noted.          We Offer Telehealth & Same Day Appointments!   Book  your Telehealth appointment with me through my nurse or   Clinic appointments on [employer]!  [phone]     To Schedule appointments online, go to [facility]: [email or web address]     This note was generated with the assistance of ambient listening technology. Verbal consent was obtained by the patient and accompanying visitor(s) for the recording of patient appointment to facilitate this note. I attest to having reviewed and edited the generated note for accuracy, though some syntax or spelling errors may persist. Please contact the author of this note for any clarification.        Answers submitted by the patient for this visit:  Review of Systems Questionnaire (Submitted on 4/30/2025)  activity change: No  hearing loss: No  trouble swallowing: No  eye discharge: No  chest tightness: No  wheezing: No  chest pain: No  palpitations: No  blood in stool: No  constipation: No  vomiting: No  diarrhea: No  polydipsia: No  polyuria: No  difficulty urinating: No  hematuria: No  menstrual problem: No  dysuria: No  joint swelling: No  arthralgias: No  headaches: No  weakness: No  confusion: No  dysphoric mood: No         [1]   Current Outpatient Medications on File Prior to Visit   Medication Sig Dispense Refill    acetaminophen-codeine 300-30mg (TYLENOL #3) 300-30 mg Tab Take 1 tablet by mouth every 6 (six) hours as needed (pain). 20 tablet 0    ALPRAZolam (XANAX) 0.5 MG tablet Take 1 tablet (0.5 mg total) by mouth nightly as needed for Anxiety. 30 tablet 1    apremilast (OTEZLA) 30 mg Tab Take 1 tablet (30 mg total) by mouth 2 (two) times daily. 90 tablet 1    aspirin (ECOTRIN) 81 MG EC tablet       blood-glucose transmitter (DEXCOM G6 TRANSMITTER) Ernestine Use as directed every 90 days 1 each 3    FLUoxetine 20 MG capsule Take 3 capsules (60 mg total) by mouth once daily. 180 capsule 3    hydrOXYzine HCL (ATARAX) 25 MG tablet Take 1 tablet (25 mg total) by mouth 3 (three) times daily as needed  "for Itching. 180 tablet 3    insulin lispro (HUMALOG KWIKPEN INSULIN) 100 unit/mL pen Inject 5 units before breakfast, 12 units before lunch, 22 units before dinner (39 units per day) 30 mL 1    Lactobacillus rhamnosus GG (CULTURELLE) 10 billion cell capsule Take 1 capsule by mouth once daily.      levocetirizine (XYZAL) 5 MG tablet Take 5 mg by mouth every evening.      metFORMIN (GLUCOPHAGE-XR) 500 MG ER 24hr tablet TAKE 2 TABLETS BY MOUTH TWICE A DAY WITH MEALS 360 tablet 3    pen needle, diabetic 32 gauge x 5/32" Ndle TO BE USE WITH INSULIN  FOUR TIMES DAILY 100 each 11    rosuvastatin (CRESTOR) 5 MG tablet Take 1 tablet (5 mg total) by mouth once daily. 90 tablet 3    tirzepatide 15 mg/0.5 mL PnIj Inject 15 mg into the skin every 7 days. 4 Pen 6    [DISCONTINUED] blood-glucose sensor (DEXCOM G6 SENSOR) Ernestine Apply and change every 10 days 3 each 11    [DISCONTINUED] carvediloL (COREG) 12.5 MG tablet Take 1 tablet (12.5 mg total) by mouth 2 (two) times daily. 180 tablet 3    [DISCONTINUED] enalapril (VASOTEC) 20 MG tablet Take 1 tablet (20 mg total) by mouth 2 (two) times daily. 180 tablet 3    [DISCONTINUED] hydroCHLOROthiazide 12.5 MG Tab Take 1 tablet (12.5 mg total) by mouth every morning. 90 tablet 3    [DISCONTINUED] levothyroxine (SYNTHROID) 200 MCG tablet Take 1 tablet (200 mcg total) by mouth before breakfast. 90 tablet 3    [DISCONTINUED] levothyroxine (SYNTHROID) 50 MCG tablet Take 1 tablet (50 mcg total) by mouth before breakfast. 90 tablet 3    [DISCONTINUED] TOUJEO MAX U-300 SOLOSTAR 300 unit/mL (3 mL) insulin pen Inject 60 Units into the skin once daily.       No current facility-administered medications on file prior to visit.   [2]   Social History  Socioeconomic History    Marital status:    Tobacco Use    Smoking status: Never    Smokeless tobacco: Never   Substance and Sexual Activity    Alcohol use: Yes     Alcohol/week: 1.0 standard drink of alcohol     Types: 1 Glasses of wine per " week     Comment: rarely    Drug use: Never    Sexual activity: Yes     Partners: Male     Birth control/protection: None     Social Drivers of Health     Financial Resource Strain: Low Risk  (4/28/2025)    Overall Financial Resource Strain (CARDIA)     Difficulty of Paying Living Expenses: Not hard at all   Food Insecurity: No Food Insecurity (4/28/2025)    Hunger Vital Sign     Worried About Running Out of Food in the Last Year: Never true     Ran Out of Food in the Last Year: Never true   Transportation Needs: No Transportation Needs (4/28/2025)    PRAPARE - Transportation     Lack of Transportation (Medical): No     Lack of Transportation (Non-Medical): No   Physical Activity: Insufficiently Active (4/28/2025)    Exercise Vital Sign     Days of Exercise per Week: 3 days     Minutes of Exercise per Session: 30 min   Stress: No Stress Concern Present (4/28/2025)    [employer] - Occupational Stress Questionnaire     Feeling of Stress : Only a little   Housing Stability: Low Risk  (4/28/2025)    Housing Stability Vital Sign     Unable to Pay for Housing in the Last Year: No     Number of Times Moved in the Last Year: 0     Homeless in the Last Year: No

## 2025-05-26 ENCOUNTER — OFFICE VISIT (OUTPATIENT)
Dept: ORTHOPEDICS | Facility: CLINIC | Age: 30
End: 2025-05-26
Payer: COMMERCIAL

## 2025-05-26 VITALS — WEIGHT: 293 LBS | BODY MASS INDEX: 47.09 KG/M2 | HEIGHT: 66 IN

## 2025-05-26 DIAGNOSIS — G56.02 LEFT CARPAL TUNNEL SYNDROME: Primary | ICD-10-CM

## 2025-05-26 PROCEDURE — 3051F HG A1C>EQUAL 7.0%<8.0%: CPT | Mod: CPTII,S$GLB,, | Performed by: STUDENT IN AN ORGANIZED HEALTH CARE EDUCATION/TRAINING PROGRAM

## 2025-05-26 PROCEDURE — 99213 OFFICE O/P EST LOW 20 MIN: CPT | Mod: 25,S$GLB,, | Performed by: STUDENT IN AN ORGANIZED HEALTH CARE EDUCATION/TRAINING PROGRAM

## 2025-05-26 PROCEDURE — 1159F MED LIST DOCD IN RCRD: CPT | Mod: CPTII,S$GLB,, | Performed by: STUDENT IN AN ORGANIZED HEALTH CARE EDUCATION/TRAINING PROGRAM

## 2025-05-26 PROCEDURE — 1160F RVW MEDS BY RX/DR IN RCRD: CPT | Mod: CPTII,S$GLB,, | Performed by: STUDENT IN AN ORGANIZED HEALTH CARE EDUCATION/TRAINING PROGRAM

## 2025-05-26 PROCEDURE — 4010F ACE/ARB THERAPY RXD/TAKEN: CPT | Mod: CPTII,S$GLB,, | Performed by: STUDENT IN AN ORGANIZED HEALTH CARE EDUCATION/TRAINING PROGRAM

## 2025-05-26 PROCEDURE — 20526 THER INJECTION CARP TUNNEL: CPT | Mod: LT,S$GLB,, | Performed by: STUDENT IN AN ORGANIZED HEALTH CARE EDUCATION/TRAINING PROGRAM

## 2025-05-26 PROCEDURE — 3008F BODY MASS INDEX DOCD: CPT | Mod: CPTII,S$GLB,, | Performed by: STUDENT IN AN ORGANIZED HEALTH CARE EDUCATION/TRAINING PROGRAM

## 2025-05-26 PROCEDURE — 3061F NEG MICROALBUMINURIA REV: CPT | Mod: CPTII,S$GLB,, | Performed by: STUDENT IN AN ORGANIZED HEALTH CARE EDUCATION/TRAINING PROGRAM

## 2025-05-26 PROCEDURE — 76942 ECHO GUIDE FOR BIOPSY: CPT | Mod: S$GLB,,, | Performed by: STUDENT IN AN ORGANIZED HEALTH CARE EDUCATION/TRAINING PROGRAM

## 2025-05-26 PROCEDURE — 99999 PR PBB SHADOW E&M-EST. PATIENT-LVL III: CPT | Mod: PBBFAC,,, | Performed by: STUDENT IN AN ORGANIZED HEALTH CARE EDUCATION/TRAINING PROGRAM

## 2025-05-26 PROCEDURE — 3066F NEPHROPATHY DOC TX: CPT | Mod: CPTII,S$GLB,, | Performed by: STUDENT IN AN ORGANIZED HEALTH CARE EDUCATION/TRAINING PROGRAM

## 2025-05-26 RX ORDER — BETAMETHASONE SODIUM PHOSPHATE AND BETAMETHASONE ACETATE 3; 3 MG/ML; MG/ML
6 INJECTION, SUSPENSION INTRA-ARTICULAR; INTRALESIONAL; INTRAMUSCULAR; SOFT TISSUE
Status: DISCONTINUED | OUTPATIENT
Start: 2025-05-26 | End: 2025-05-26 | Stop reason: HOSPADM

## 2025-05-26 RX ADMIN — BETAMETHASONE SODIUM PHOSPHATE AND BETAMETHASONE ACETATE 6 MG: 3; 3 INJECTION, SUSPENSION INTRA-ARTICULAR; INTRALESIONAL; INTRAMUSCULAR; SOFT TISSUE at 03:05

## 2025-05-26 NOTE — PROCEDURES
Carpal Tunnel: L carpal tunnel    Date/Time: 5/26/2025 3:20 PM    Performed by: Livan Mendez MD  Authorized by: Livan Mendez MD    Consent Done?:  Yes (Verbal)  Indications:  Pain  Site marked: the procedure site was marked    Timeout: prior to procedure the correct patient, procedure, and site was verified    Prep: patient was prepped and draped in usual sterile fashion      Local anesthetic:  Lidocaine 1% without epinephrine  Location:  Wrist  Site:  L carpal tunnel  Ultrasonic Guidance for Needle Placement?: Yes    Needle size:  25 G  Approach:  Volar  Medications:  6 mg betamethasone acetate-betamethasone sodium phosphate 6 mg/mL  Patient tolerance:  Patient tolerated the procedure well with no immediate complications     Additional Comments: Ultrasound guidance was used for needle localization. Images were saved and stored for documentation. The appropriate structures were visualized. Dynamic visualization of the needle was continuous throughout the procedures and maintained good position.

## 2025-05-26 NOTE — PATIENT INSTRUCTIONS
Assessment:  Candida Barker is a 30 y.o. female   Chief Complaint   Patient presents with    Left Wrist - Pain    Follow-up       No diagnosis found.     Plan:  Ultrasound guided cortisone injection to the left wrist  We discussed the proper protocols after the injection such as no submerging pools, baths tubs, or hot tubs for 24 hr.  Showering is okay today.  We also discussed that blood sugars can be elevated after an injection and asked patient to properly checked her sugars over the next few days and contact their PCP if there are any concerns.  We discussed red flags such as fevers, chills, red, warm, tender joint at the area of injection to please seek medical care immediately.    Follow up as needed    Follow-up: as needed.    Thank you for choosing [facility] and Dr. Livan Mendez for your orthopedic & sports medicine care. It is our goal to provide you with exceptional care that will help keep you healthy, active, and get you back in the game.    Please do not hesitate to reach out to us via email, phone, or MyChart with any questions, concerns, or feedback.    If you felt that you received exemplary care today, please consider leaving us feedback on [employer] at:  [email or web address]    If you are experiencing pain/discomfort ,or have questions after 5pm and would like to be connected to the [facility] on-call team, please call this number and specify which Sports Medicine provider is treating you: [phone]

## 2025-05-26 NOTE — PROGRESS NOTES
Patient ID: Candida Barker  YOB: 1995  MRN: [id number]    Chief Complaint: Pain of the Left Wrist and Follow-up    History of Present Illness: Candida Barker is a right-hand dominant 30 y.o. female who presents today with left wrist pain.  Last seen in clinic on 01/13/25  and received left carpal tunnel cortisone injection.  Reports that her right forearm is bothering her at this time.  Left wrist / hand started to bother her a few weeks ago with burning and numbness.  Rates her pain today at a 3/10.  Interested in repeating CSI.      01/13/2025 Interval History of Present Illness: Candida Barker is a right-hand dominant 30 y.o. female who presents today with left wrist pain.  Last seen in clinic on 9/9/2024 and received left carpal tunnel cortisone injection.  Reports that her right hand / wrist is not bothering her at this time.  Left wrist / hand started to bother her a few weeks ago with burning and numbness.  Rates her pain today at a 3/10.  Interested in repeating CSI    9/9/2024 Interval History of Present Illness: Candida Barker is a right-hand dominant 29 y.o. female who presents today for followup carpal tunnel, left . Last office visit was on 5/17, where she received a steroid injection to left wrist. Injection has worn off and she is now experiencing a tingling, stabbing pain. She's still having burning and numbness at night in both hands. Patient states that she currently does use braces as needed to help with symptom relief and has been taking Gabapentin but does not see a change with the medication.     05/17/2024 Interval History of Present Illness: Candida Barker is a right-hand dominant 29 y.o. female who presents today with bilateral carpal tunnel, left greater than the right  Reports that she has been having issues for the last couple of months.  Received an EMG from Dr. Lissett Solorio on 5/10/2024.  Rates pain today at a 3/10 and describes as tingling, stabbing pain.  Reports burning and  numbness at night in both hands.  Patient states that she currently does use braces as needed to help with symptom relief and has been taking Gabapentin but does not see a change with the medication.     The patient is active in none.  Occupation: Nurse - [employer]      Past Medical History:   Past Medical History:   Diagnosis Date    Carpal tunnel syndrome     DM (diabetes mellitus), type 2     Ganglion cyst     HTN (hypertension)     Hypothyroidism, unspecified     Mixed hyperlipidemia     Obesity, unspecified     Polycystic ovaries 2024    Radiation     Seasonal allergies     Sleep apnea      Past Surgical History:   Procedure Laterality Date    CHOLECYSTECTOMY  12/2023    ganglion cystectomy Right     wrist twice    HAND SURGERY  2006,2008    Ganglion cyst x2 right wrist     Family History   Problem Relation Name Age of Onset    Breast cancer Maternal Grandmother Keysha     Cancer Maternal Grandmother Keysha     Vision loss Maternal Grandmother Keysha     Diabetes Maternal Grandfather Pablo     Drug abuse Father Israel     Hypertension Father Israel     Diabetes Mother Leander     Early death Mother Beata     Hypertension Mother Beata     Miscarriages / Stillbirths Mother Beata     Asthma Maternal Aunt Francisca     Hypertension Maternal Aunt Francisca     Stroke Maternal Aunt Francisca     Diabetes Maternal Aunt Kely     Amblyopia Neg Hx      Blindness Neg Hx      Retinal detachment Neg Hx      Ovarian cancer Neg Hx       Social History     Socioeconomic History    Marital status:    Tobacco Use    Smoking status: Never    Smokeless tobacco: Never   Substance and Sexual Activity    Alcohol use: Yes     Alcohol/week: 1.0 standard drink of alcohol     Types: 1 Glasses of wine per week     Comment: rarely    Drug use: Never    Sexual activity: Yes     Partners: Male     Birth control/protection: None     Social Drivers of Health     Financial Resource Strain: Low Risk  (4/28/2025)    Overall Financial Resource  Strain (CARDIA)     Difficulty of Paying Living Expenses: Not hard at all   Food Insecurity: No Food Insecurity (4/28/2025)    Hunger Vital Sign     Worried About Running Out of Food in the Last Year: Never true     Ran Out of Food in the Last Year: Never true   Transportation Needs: No Transportation Needs (4/28/2025)    PRAPARE - Transportation     Lack of Transportation (Medical): No     Lack of Transportation (Non-Medical): No   Physical Activity: Insufficiently Active (4/28/2025)    Exercise Vital Sign     Days of Exercise per Week: 3 days     Minutes of Exercise per Session: 30 min   Stress: No Stress Concern Present (4/28/2025)    [employer] - Occupational Stress Questionnaire     Feeling of Stress : Only a little   Housing Stability: Low Risk  (4/28/2025)    Housing Stability Vital Sign     Unable to Pay for Housing in the Last Year: No     Number of Times Moved in the Last Year: 0     Homeless in the Last Year: No     Medication List with Changes/Refills   Current Medications    ALPRAZOLAM (XANAX) 0.5 MG TABLET    Take 1 tablet (0.5 mg total) by mouth nightly as needed for Anxiety.    APREMILAST (OTEZLA) 30 MG TAB    Take 1 tablet (30 mg total) by mouth 2 (two) times daily.    ASPIRIN (ECOTRIN) 81 MG EC TABLET        BLOOD-GLUCOSE SENSOR (DEXCOM G6 SENSOR) GERALDO    Apply and change every 10 days    BLOOD-GLUCOSE TRANSMITTER (DEXCOM G6 TRANSMITTER) GERALDO    Use as directed every 90 days    CARVEDILOL (COREG) 12.5 MG TABLET    Take 1 tablet (12.5 mg total) by mouth 2 (two) times daily.    ENALAPRIL (VASOTEC) 20 MG TABLET    Take 1 tablet (20 mg total) by mouth 2 (two) times daily.    FLUOXETINE 20 MG CAPSULE    Take 3 capsules (60 mg total) by mouth once daily.    HYDROCHLOROTHIAZIDE 12.5 MG TAB    Take 1 tablet (12.5 mg total) by mouth every morning.    HYDROXYZINE HCL (ATARAX) 25 MG TABLET    Take 1 tablet (25 mg total) by mouth 3 (three) times daily as needed for Itching.     "INSULIN LISPRO (HUMALOG KWIKPEN INSULIN) 100 UNIT/ML PEN    Inject 5 units before breakfast, 12 units before lunch, 22 units before dinner (39 units per day)    LACTOBACILLUS RHAMNOSUS GG (CULTURELLE) 10 BILLION CELL CAPSULE    Take 1 capsule by mouth once daily.    LEVOCETIRIZINE (XYZAL) 5 MG TABLET    Take 5 mg by mouth every evening.    LEVOTHYROXINE (SYNTHROID) 200 MCG TABLET    Take 1 tablet (200 mcg total) by mouth before breakfast.    LEVOTHYROXINE (SYNTHROID) 50 MCG TABLET    Take 1 tablet (50 mcg total) by mouth before breakfast.    METFORMIN (GLUCOPHAGE-XR) 500 MG ER 24HR TABLET    TAKE 2 TABLETS BY MOUTH TWICE A DAY WITH MEALS    PEN NEEDLE, DIABETIC 32 GAUGE X 5/32" NDLE    TO BE USE WITH INSULIN  FOUR TIMES DAILY    ROSUVASTATIN (CRESTOR) 5 MG TABLET    Take 1 tablet (5 mg total) by mouth once daily.    TIRZEPATIDE 15 MG/0.5 ML PNIJ    Inject 15 mg into the skin every 7 days.    TOUJEO MAX U-300 SOLOSTAR 300 UNIT/ML (3 ML) INSULIN PEN    Inject 30 Units into the skin once daily.     Review of patient's allergies indicates:  No Known Allergies    Physical Exam:   Body mass index is 47.79 kg/m².    GENERAL: Well appearing, in no acute distress.  HEAD: Normocephalic and atraumatic.  ENT: External ears and nose grossly normal.  EYES: EOMI bilaterally  PULMONARY: Respirations are grossly even and non-labored.  NEURO: Awake, alert, and oriented x 3.  SKIN: No obvious rashes appreciated.  PSYCH: Mood & affect are appropriate.    Detailed MSK exam:     Left hand/wrist exam:   -TTP: none  -Swelling/ecchymosis: none  -Full ROM  - strength 5/5  -Sensation intact  -Pulses 2+  -Rotational deformity: negative  -Tinel sign: positive  -Phalen sign: positive  -Finkelstein sign: negative      Imaging:  CT Abdomen Pelvis With IV Contrast Routine Oral Contrast  Narrative: EXAMINATION:  CT ABDOMEN PELVIS WITH IV CONTRAST    CLINICAL HISTORY:  Abdominal pain, acute, nonlocalized;    TECHNIQUE:  Low dose axial images, " sagittal and coronal reformations were obtained from the lung bases to the pubic symphysis following the IV administration of 100 mL of Omnipaque 350.    COMPARISON:  None    FINDINGS:  Heart: Normal size as far as seen. No effusion as far as seen.    Lung Bases: Trace right pleural effusion.  Tiny left pleural effusion.    Liver: Fatty infiltration and hepatomegaly. No focal lesions.    Gallbladder: No calcified gallstones.    Bile Ducts: No dilatation.    Pancreas: No mass. No peripancreatic fat stranding.    Spleen: Normal.    Adrenals: Normal.    Kidneys/Ureters: Normal enhancement.  No mass or hydroureteronephrosis. Punctate non-obstructing left renal calculi suspected.    Bladder: No wall thickening.    Reproductive organs: Normal.    GI Tract/Mesentery: No evidence of bowel obstruction or inflammation.  No evidence of acute appendicitis.    Peritoneal Space: No ascites or free air.    Retroperitoneum: No significant adenopathy.    Abdominal wall: Normal.    Vasculature: No aneurysm.    Bones: No acute fracture. No suspicious lytic or sclerotic lesions.  Impression: Trace right pleural effusion.  Tiny left pleural effusion.  Fatty infiltration and hepatomegaly.    All CT scans at this facility use dose modulation, iterative reconstruction, and/or weight based dosing when appropriate to reduce radiation dose to as low as reasonably achievable.    Electronically signed by: Ashvin Kamara  Date:    04/30/2025  Time:    18:30        Relevant imaging results were reviewed and interpreted by me and per my read shows no acute abnormalities.  This was discussed with the patient and / or family today.     Assessment:  Candida Barker is a 30 y.o. female following up for left CTS. Interested in repeating steroid injection today. Not interested in surgery yet and injections have been doing well for her.   Plan: Steroid injection given today (see separate procedure note for details). We discussed the proper protocols after the  injection such as no submerging pools, baths tubs, or hot tubs for 24 hr.  Showering is okay today.  We also discussed that blood sugars can be elevated after an injection and asked patient to properly checked her sugars over the next few days and contact their PCP if there are any concerns.  We discussed red flags such as fevers, chills, red, warm, tender joint at the area of injection to please seek medical care immediately.   Continue conservative management for pain.   Follow up as needed. All questions answered.     Left carpal tunnel syndrome  -     Sports Medicine US - Guidance for Needle Placement  -     Carpal Tunnel: L carpal tunnel           Ultrasound guidance was used for needle localization. Images were saved and stored for documentation. The appropriate structures were visualized. Dynamic visualization of the needle was continuous throughout the procedures and maintained good position.      Electronically signed:  Livan Mendez MD, MPH  05/26/2025  3:47 PM

## 2025-05-26 NOTE — PROCEDURES
Sports Medicine US - Guidance for Needle Placement    Date/Time: 5/26/2025 3:20 PM    Performed by: Livan Mendez MD  Authorized by: Livan Mendez MD  Preparation: Patient was prepped and draped in the usual sterile fashion.  Local anesthesia used: no    Anesthesia:  Local anesthesia used: no    Sedation:  Patient sedated: no    Patient tolerance: patient tolerated the procedure well with no immediate complications  Comments: Ultrasound guidance was used for needle localization. Images were saved and stored for documentation. The appropriate structures were visualized. Dynamic visualization of the needle was continuous throughout the procedures and maintained good position.

## 2025-06-04 ENCOUNTER — PATIENT MESSAGE (OUTPATIENT)
Dept: FAMILY MEDICINE | Facility: CLINIC | Age: 30
End: 2025-06-04
Payer: COMMERCIAL

## 2025-06-04 DIAGNOSIS — E11.65 TYPE 2 DIABETES MELLITUS WITH HYPERGLYCEMIA, WITH LONG-TERM CURRENT USE OF INSULIN: Primary | ICD-10-CM

## 2025-06-04 DIAGNOSIS — L40.9 PSORIASIS OF SCALP: ICD-10-CM

## 2025-06-04 DIAGNOSIS — Z79.4 TYPE 2 DIABETES MELLITUS WITH HYPERGLYCEMIA, WITH LONG-TERM CURRENT USE OF INSULIN: Primary | ICD-10-CM

## 2025-06-04 DIAGNOSIS — L21.0 DANDRUFF: ICD-10-CM

## 2025-06-04 RX ORDER — BLOOD-GLUCOSE,RECEIVER,CONT
1 EACH MISCELLANEOUS
Qty: 1 EACH | Refills: 11 | Status: SHIPPED | OUTPATIENT
Start: 2025-06-04 | End: 2026-06-04

## 2025-06-04 NOTE — TELEPHONE ENCOUNTER
Orders Placed This Encounter   Procedures    Ambulatory referral/consult to Dermatology     Standing Status:   Future     Expected Date:   2025     Expiration Date:   2026     Referral Priority:   Routine     Referral Type:   Consultation     Referral Reason:   Specialty Services Required     Referred to Provider:   Julia Mathew MD     Requested Specialty:   Dermatology     Number of Visits Requested:   1       Medications Ordered This Encounter   Medications    blood-glucose,,cont (DEXCOM G7 ) Misc     Si each by Misc.(Non-Drug; Combo Route) route every 10 days.     Dispense:  1 each     Refill:  11

## 2025-06-09 ENCOUNTER — TELEPHONE (OUTPATIENT)
Dept: PSYCHIATRY | Facility: CLINIC | Age: 30
End: 2025-06-09
Payer: COMMERCIAL

## 2025-06-09 NOTE — TELEPHONE ENCOUNTER
Pt called to schedule a np appt. Bc she had a referral. I informed pt the referral was for the [facility]. Pt voiced understanding.

## 2025-06-10 ENCOUNTER — OFFICE VISIT (OUTPATIENT)
Dept: PSYCHIATRY | Facility: CLINIC | Age: 30
End: 2025-06-10
Payer: COMMERCIAL

## 2025-06-10 DIAGNOSIS — F41.9 ANXIETY: ICD-10-CM

## 2025-06-10 PROCEDURE — 3051F HG A1C>EQUAL 7.0%<8.0%: CPT | Mod: CPTII,95,, | Performed by: SOCIAL WORKER

## 2025-06-10 PROCEDURE — 90791 PSYCH DIAGNOSTIC EVALUATION: CPT | Mod: 95,,, | Performed by: SOCIAL WORKER

## 2025-06-10 PROCEDURE — 3066F NEPHROPATHY DOC TX: CPT | Mod: CPTII,95,, | Performed by: SOCIAL WORKER

## 2025-06-10 PROCEDURE — 4010F ACE/ARB THERAPY RXD/TAKEN: CPT | Mod: CPTII,95,, | Performed by: SOCIAL WORKER

## 2025-06-10 PROCEDURE — 3061F NEG MICROALBUMINURIA REV: CPT | Mod: CPTII,95,, | Performed by: SOCIAL WORKER
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
## 2025-06-23 DIAGNOSIS — F41.9 ANXIETY: ICD-10-CM

## 2025-06-23 DIAGNOSIS — E78.5 HYPERLIPIDEMIA ASSOCIATED WITH TYPE 2 DIABETES MELLITUS: ICD-10-CM

## 2025-06-23 DIAGNOSIS — E11.65 TYPE 2 DIABETES MELLITUS WITH HYPERGLYCEMIA, WITH LONG-TERM CURRENT USE OF INSULIN: ICD-10-CM

## 2025-06-23 DIAGNOSIS — E11.69 HYPERLIPIDEMIA ASSOCIATED WITH TYPE 2 DIABETES MELLITUS: ICD-10-CM

## 2025-06-23 DIAGNOSIS — Z79.4 TYPE 2 DIABETES MELLITUS WITH HYPERGLYCEMIA, WITH LONG-TERM CURRENT USE OF INSULIN: ICD-10-CM

## 2025-06-23 RX ORDER — ROSUVASTATIN CALCIUM 5 MG/1
5 TABLET, COATED ORAL DAILY
Qty: 90 TABLET | Refills: 3 | Status: SHIPPED | OUTPATIENT
Start: 2025-06-23

## 2025-06-23 RX ORDER — METFORMIN HYDROCHLORIDE 500 MG/1
1000 TABLET, EXTENDED RELEASE ORAL 2 TIMES DAILY WITH MEALS
Qty: 360 TABLET | Refills: 3 | Status: SHIPPED | OUTPATIENT
Start: 2025-06-23

## 2025-06-23 RX ORDER — ALPRAZOLAM 0.5 MG/1
0.5 TABLET ORAL NIGHTLY PRN
Qty: 30 TABLET | Refills: 1 | Status: SHIPPED | OUTPATIENT
Start: 2025-06-23

## 2025-06-23 NOTE — TELEPHONE ENCOUNTER
No care due was identified.  [facility] Embedded Care Due Messages. Reference number: [id number].   6/23/2025 11:02:27 AM CDT

## 2025-06-23 NOTE — TELEPHONE ENCOUNTER
No care due was identified.  [facility] Embedded Care Due Messages. Reference number: [id number].   6/23/2025 11:01:50 AM CDT

## 2025-06-23 NOTE — TELEPHONE ENCOUNTER
Refill Decision Note   Candida Barker  is requesting a refill authorization.    Brief Assessment and Rationale for Refill:   Approve       Medication Therapy Plan:         Comments:     Note composed:4:38 PM 06/23/2025

## 2025-06-26 ENCOUNTER — OFFICE VISIT (OUTPATIENT)
Dept: PSYCHIATRY | Facility: CLINIC | Age: 30
End: 2025-06-26
Payer: COMMERCIAL

## 2025-06-26 DIAGNOSIS — Z91.49 HISTORY OF PSYCHOLOGICAL TRAUMA: ICD-10-CM

## 2025-06-26 DIAGNOSIS — F41.1 GAD (GENERALIZED ANXIETY DISORDER): Primary | ICD-10-CM

## 2025-06-26 DIAGNOSIS — F33.0 MDD (MAJOR DEPRESSIVE DISORDER), RECURRENT EPISODE, MILD: ICD-10-CM

## 2025-06-26 PROCEDURE — 3066F NEPHROPATHY DOC TX: CPT | Mod: CPTII,95,, | Performed by: SOCIAL WORKER

## 2025-06-26 PROCEDURE — 3061F NEG MICROALBUMINURIA REV: CPT | Mod: CPTII,95,, | Performed by: SOCIAL WORKER

## 2025-06-26 PROCEDURE — 4010F ACE/ARB THERAPY RXD/TAKEN: CPT | Mod: CPTII,95,, | Performed by: SOCIAL WORKER

## 2025-06-26 PROCEDURE — 3051F HG A1C>EQUAL 7.0%<8.0%: CPT | Mod: CPTII,95,, | Performed by: SOCIAL WORKER

## 2025-06-26 PROCEDURE — 90834 PSYTX W PT 45 MINUTES: CPT | Mod: 95,,, | Performed by: SOCIAL WORKER

## 2025-06-26 NOTE — PROGRESS NOTES
"The patient location is: [place]  The chief complaint leading to consultation is: follow up/trauma    Visit type: audiovisual    Face to Face time with patient: 45  45 minutes of total time spent on the encounter, which includes face to face time and non-face to face time preparing to see the patient (eg, review of tests), Obtaining and/or reviewing separately obtained history, Documenting clinical information in the electronic or other health record, Independently interpreting results (not separately reported) and communicating results to the patient/family/caregiver, or Care coordination (not separately reported).         Each patient to whom he or she provides medical services by telemedicine is:  (1) informed of the relationship between the physician and patient and the respective role of any other health care provider with respect to management of the patient; and (2) notified that he or she may decline to receive medical services by telemedicine and may withdraw from such care at any time.    Notes:               Individual Psychotherapy (PhD/LCSW)    2025    Interim Events/Subjective Report/Content of Current Session:  follow-up appointment.    Pt is a 30 y.o. female with past psychiatric hx of  ISABELL and MDD with history of trauma who presents for follow-up treatment.    Pt recalled trauma today and reviewed hx of growing up with her mother.     Pt described hx of trauma in narrative form. She stated that when her mother  she moved into her aunt and uncles home which then made her be in the middle of the biological children ages and created feelings of jealously. Pt no longer speaks to bio dad due to past hx. Discussed symptoms experienced due to trauma.  Main fear is that she is now a parent and does not "want to be her mother." Specifically, due to rage and outbursts. Discussed hx of anger. Provided information of ways to body scan, identify warning signs, and manage emotional reaction.    " Provided resource for [employer] Body Keeps the Score    Pt appears to be motivated to continue to process trauma and parenting issues that are affected by trauma.     Current symptoms:  Depression: dysphoric mood.  Anxiety: excessive worrying, restlessness, and muscle tension.  Sleep: non-restful sleep.  [place]:  denies.  Psychosis: denies .  Therapeutic Intervention/Techniques: behavior modification, insight oriented, interactive, and supportive; relevant to diagnosis, patient responds to this modality    Risk Parameters:  Patient reports no suicidal ideation  Patient reports no homicidal ideation  Patient reports no self-injurious behavior  Patient reports no violent behavior    Diagnosis:     Return to Clinic: 2 weeks  Counseling time: 45  -Call to report any worsening of symptoms or problems associated with medication.  - Pt instructed to go to ER if thoughts of harming self or others arise.   -Supportive therapy and psychoeducation provided  -Pt instructed to call clinic, 911 or go to nearest emergency room if sxs worsen or pt is in crisis. The pt expresses understanding.   Each patient to whom he or she provides medical services by telemedicine is:  (1) informed of the relationship between the physician and patient and the respective role of any other health care provider with respect to management of the patient; and (2) notified that he or she may decline to receive medical services by telemedicine and may withdraw from such care at any time.

## 2025-07-03 ENCOUNTER — PATIENT MESSAGE (OUTPATIENT)
Dept: OBSTETRICS AND GYNECOLOGY | Facility: CLINIC | Age: 30
End: 2025-07-03

## 2025-07-03 ENCOUNTER — OFFICE VISIT (OUTPATIENT)
Dept: OBSTETRICS AND GYNECOLOGY | Facility: CLINIC | Age: 30
End: 2025-07-03
Payer: COMMERCIAL

## 2025-07-03 DIAGNOSIS — Z97.5 CONTRACEPTION, DEVICE INTRAUTERINE: Primary | ICD-10-CM

## 2025-07-03 NOTE — PROGRESS NOTES
No chief complaint on file.      History of Present Illness   30 y.o. F patient presents today for contraception. Desires LNG IUD. She recently adopted 3 children she was fostering. Does not desire pregnancy for now. She has had a Kyleena in the past and did well with it.      Patient's last menstrual period was 06/25/2025 (approximate).     Past medical and surgical history reviewed.   I have reviewed the patient's medical history in detail and updated the computerized patient record.  Review of patient's allergies indicates:  No Known Allergies  Review of Systems  Constitutional: negative for chills and fatigue  Gastrointestinal: negative for abdominal pain, constipation, diarrhea, nausea and vomiting  Genitourinary:negative for abnormal menstrual periods, genital lesions and vaginal discharge, dysuria, frequency and hematuria    Physical Examination:  LMP 06/25/2025 (Approximate)    Physical Exam  Deferred    Assessment/Plan:  Contraception, device intrauterine  -     Device Authorization Order        Discussed contraception options. Interested in Kyleena.  Device ordered  Follow up in 2 weeks for insertion. May premedicate with Motrin 600mg       I spent a total of 20 minutes on the day of the visit. This includes face to face time and non-face to face time preparing to see the patient (eg, review of tests), obtaining and/or reviewing separately obtained history, documenting clinical information in the electronic or other health record, independently interpreting results and communicating results to the patient/family/caregiver, or care coordinator.

## 2025-07-16 ENCOUNTER — OFFICE VISIT (OUTPATIENT)
Dept: FAMILY MEDICINE | Facility: CLINIC | Age: 30
End: 2025-07-16
Payer: COMMERCIAL

## 2025-07-16 VITALS
BODY MASS INDEX: 47.09 KG/M2 | SYSTOLIC BLOOD PRESSURE: 128 MMHG | DIASTOLIC BLOOD PRESSURE: 84 MMHG | HEIGHT: 66 IN | OXYGEN SATURATION: 99 % | WEIGHT: 293 LBS | HEART RATE: 92 BPM

## 2025-07-16 DIAGNOSIS — M79.672 LEFT FOOT PAIN: Primary | ICD-10-CM

## 2025-07-16 PROCEDURE — 3066F NEPHROPATHY DOC TX: CPT | Mod: CPTII,S$GLB,, | Performed by: PHYSICIAN ASSISTANT

## 2025-07-16 PROCEDURE — 99213 OFFICE O/P EST LOW 20 MIN: CPT | Mod: S$GLB,,, | Performed by: PHYSICIAN ASSISTANT

## 2025-07-16 PROCEDURE — 3079F DIAST BP 80-89 MM HG: CPT | Mod: CPTII,S$GLB,, | Performed by: PHYSICIAN ASSISTANT

## 2025-07-16 PROCEDURE — 3051F HG A1C>EQUAL 7.0%<8.0%: CPT | Mod: CPTII,S$GLB,, | Performed by: PHYSICIAN ASSISTANT

## 2025-07-16 PROCEDURE — 1159F MED LIST DOCD IN RCRD: CPT | Mod: CPTII,S$GLB,, | Performed by: PHYSICIAN ASSISTANT

## 2025-07-16 PROCEDURE — 3061F NEG MICROALBUMINURIA REV: CPT | Mod: CPTII,S$GLB,, | Performed by: PHYSICIAN ASSISTANT

## 2025-07-16 PROCEDURE — 99999 PR PBB SHADOW E&M-EST. PATIENT-LVL V: CPT | Mod: PBBFAC,,, | Performed by: PHYSICIAN ASSISTANT

## 2025-07-16 PROCEDURE — 3074F SYST BP LT 130 MM HG: CPT | Mod: CPTII,S$GLB,, | Performed by: PHYSICIAN ASSISTANT

## 2025-07-16 PROCEDURE — 4010F ACE/ARB THERAPY RXD/TAKEN: CPT | Mod: CPTII,S$GLB,, | Performed by: PHYSICIAN ASSISTANT

## 2025-07-16 PROCEDURE — 1160F RVW MEDS BY RX/DR IN RCRD: CPT | Mod: CPTII,S$GLB,, | Performed by: PHYSICIAN ASSISTANT

## 2025-07-16 PROCEDURE — 3008F BODY MASS INDEX DOCD: CPT | Mod: CPTII,S$GLB,, | Performed by: PHYSICIAN ASSISTANT

## 2025-07-16 RX ORDER — MELOXICAM 15 MG/1
15 TABLET ORAL DAILY
Qty: 30 TABLET | Refills: 0 | Status: SHIPPED | OUTPATIENT
Start: 2025-07-16

## 2025-07-16 NOTE — PROGRESS NOTES
Assessment/Plan:    1. Left foot pain  -     X-Ray Foot Complete Left; Future; Expected date: 07/16/2025  -     meloxicam (MOBIC) 15 MG tablet; Take 1 tablet (15 mg total) by mouth once daily.  Dispense: 30 tablet; Refill: 0    -L foot pain >1 week  -no alarm symptoms or signs present  -will obtain L foot XR today  -continue conservative treatment, rest, ice/heat applications, PRN anti-inflammatory medication  -consider podiatry referral if symptoms persist  -ER precautions for severe or worsening of symptoms    Follow up if symptoms worsen or fail to improve.    Misa Hamilton PA-C  _____________________________________________________________________________________________________________________________________________________    CC: left foot pain    HPI: Patient is in clinic today as an established patient here for left foot pain.    HISTORY OF PRESENT ILLNESS:  She reports left foot pain localized to the lateral aspect of the top of the foot extending up to the ankle, present for 1 week. She has been limping due to pain. She denies any history of trauma or injuries. She denies redness, swelling or bruising. She denies prior history of foot issues. She is currently managing pain with ibuprofen.    MEDICATIONS:  She is taking ibuprofen for pain management.    No other complaints today.     Past Medical History:   Diagnosis Date    Carpal tunnel syndrome     DM (diabetes mellitus), type 2     Ganglion cyst     HTN (hypertension)     Hypothyroidism, unspecified     Mixed hyperlipidemia     Obesity, unspecified     Polycystic ovaries 2024    Radiation     Seasonal allergies     Sleep apnea      Past Surgical History:   Procedure Laterality Date    CHOLECYSTECTOMY  12/2023    ganglion cystectomy Right     wrist twice    HAND SURGERY  2006,2008    Ganglion cyst x2 right wrist     Review of patient's allergies indicates:  No Known Allergies  Social History[1]  Family History   Problem Relation Name Age of Onset     LVM to see if patient can update us with new insurance that was to go into effect 8/1/24.    "Breast cancer Maternal Grandmother Keysha     Cancer Maternal Grandmother Keysha     Vision loss Maternal Grandmother Keysha     Diabetes Maternal Grandfather Pablo     Drug abuse Father Israel     Hypertension Father [place]     Diabetes Mother [place]     Early death Mother [place]     Hypertension Mother [place]     Miscarriages / Stillbirths Mother [place]     Asthma Maternal Aunt Francisca     Hypertension Maternal Aunt Francisca     Stroke Maternal Aunt Francisca     Diabetes Maternal Aunt Kely     Amblyopia Neg Hx      Blindness Neg Hx      Retinal detachment Neg Hx      Ovarian cancer Neg Hx       Medications Ordered Prior to Encounter[2]    Review of Systems   Constitutional:  Negative for chills, diaphoresis, fatigue and fever.   HENT:  Negative for congestion, ear pain, postnasal drip, sinus pain and sore throat.    Eyes:  Negative for pain and redness.   Respiratory:  Negative for cough, chest tightness and shortness of breath.    Cardiovascular:  Negative for chest pain and leg swelling.   Gastrointestinal:  Negative for abdominal pain, constipation, diarrhea, nausea and vomiting.   Genitourinary:  Negative for dysuria and hematuria.   Musculoskeletal:  Positive for arthralgias. Negative for joint swelling.   Skin:  Negative for rash.   Neurological:  Negative for dizziness, syncope and headaches.   Psychiatric/Behavioral:  Negative for dysphoric mood. The patient is not nervous/anxious.        Vitals:    07/16/25 1534   BP: 128/84   Pulse: 92   SpO2: 99%   Weight: 135.6 kg (299 lb)   Height: 5' 6" (1.676 m)       Wt Readings from Last 3 Encounters:   07/16/25 135.6 kg (299 lb)   05/26/25 134.3 kg (296 lb 1.2 oz)   05/02/25 134.3 kg (296 lb)       Physical Exam  Constitutional:       General: She is not in acute distress.     Appearance: Normal appearance. She is well-developed.   HENT:      Head: Normocephalic and atraumatic.   Eyes:      Conjunctiva/sclera: Conjunctivae normal.   Cardiovascular:      Rate and Rhythm: " Normal rate and regular rhythm.      Pulses: Normal pulses.      Heart sounds: Normal heart sounds. No murmur heard.  Pulmonary:      Effort: Pulmonary effort is normal. No respiratory distress.      Breath sounds: Normal breath sounds.   Abdominal:      General: Bowel sounds are normal. There is no distension.      Palpations: Abdomen is soft.      Tenderness: There is no abdominal tenderness.   Musculoskeletal:         General: Normal range of motion.      Cervical back: Normal range of motion and neck supple.      Left foot: Normal range of motion. Tenderness (5th MTP joint) present. No swelling or deformity.   Skin:     General: Skin is warm and dry.      Findings: No rash.   Neurological:      General: No focal deficit present.      Mental Status: She is alert and oriented to person, place, and time.   Psychiatric:         Mood and Affect: Mood normal.         Behavior: Behavior normal.         Health Maintenance   Topic Date Due    COVID-19 Vaccine (3 - 2024-25 season) 09/01/2024    Foot Exam  11/10/2024    Influenza Vaccine (1) 09/01/2025    Hemoglobin A1c  10/29/2025    Diabetic Eye Exam  03/21/2026    Diabetes Urine Screening  04/29/2026    Lipid Panel  04/29/2026    Cervical Cancer Screening  07/02/2027    TETANUS VACCINE  10/14/2032    RSV Vaccine (Age 60+ and Pregnant patients) (1 - 1-dose 75+ series) 04/21/2070    Hepatitis C Screening  Completed    HIV Screening  Completed    Pneumococcal Vaccines (Age 0-49)  Completed     This note was generated with the assistance of ambient listening technology. Verbal consent was obtained by the patient and accompanying visitor(s) for the recording of patient appointment to facilitate this note. I attest to having reviewed and edited the generated note for accuracy, though some syntax or spelling errors may persist. Please contact the author of this note for any clarification.             [1]   Social History  Tobacco Use    Smoking status: Never    Smokeless  tobacco: Never   Substance Use Topics    Alcohol use: Yes     Alcohol/week: 1.0 standard drink of alcohol     Types: 1 Glasses of wine per week     Comment: rarely    Drug use: Never   [2]   Current Outpatient Medications on File Prior to Visit   Medication Sig Dispense Refill    ALPRAZolam (XANAX) 0.5 MG tablet Take 1 tablet (0.5 mg total) by mouth nightly as needed for Anxiety. 30 tablet 1    apremilast (OTEZLA) 30 mg Tab Take 1 tablet (30 mg total) by mouth 2 (two) times daily. 90 tablet 1    aspirin (ECOTRIN) 81 MG EC tablet       blood-glucose sensor (DEXCOM G6 SENSOR) Ernestine Apply and change every 10 days 3 each 11    blood-glucose transmitter (DEXCOM G6 TRANSMITTER) Ernestine Use as directed every 90 days 1 each 3    blood-glucose,,cont (DEXCOM G7 ) Misc 1 each by Misc.(Non-Drug; Combo Route) route every 10 days. 1 each 11    carvediloL (COREG) 12.5 MG tablet Take 1 tablet (12.5 mg total) by mouth 2 (two) times daily. 180 tablet 3    enalapril (VASOTEC) 20 MG tablet Take 1 tablet (20 mg total) by mouth 2 (two) times daily. 180 tablet 3    FLUoxetine 20 MG capsule Take 3 capsules (60 mg total) by mouth once daily. 180 capsule 3    hydroCHLOROthiazide 12.5 MG Tab Take 1 tablet (12.5 mg total) by mouth every morning. 90 tablet 3    hydrOXYzine HCL (ATARAX) 25 MG tablet Take 1 tablet (25 mg total) by mouth 3 (three) times daily as needed for Itching. 180 tablet 3    insulin lispro (HUMALOG KWIKPEN INSULIN) 100 unit/mL pen Inject 5 units before breakfast, 12 units before lunch, 22 units before dinner (39 units per day) 30 mL 1    Lactobacillus rhamnosus GG (CULTURELLE) 10 billion cell capsule Take 1 capsule by mouth once daily.      levocetirizine (XYZAL) 5 MG tablet Take 5 mg by mouth every evening.      levothyroxine (SYNTHROID) 200 MCG tablet Take 1 tablet (200 mcg total) by mouth before breakfast. 90 tablet 3    levothyroxine (SYNTHROID) 50 MCG tablet Take 1 tablet (50 mcg total) by mouth before  "breakfast. 90 tablet 3    metFORMIN (GLUCOPHAGE-XR) 500 MG ER 24hr tablet Take 2 tablets (1,000 mg total) by mouth 2 (two) times daily with meals. 360 tablet 3    pen needle, diabetic 32 gauge x 5/32" Ndle TO BE USE WITH INSULIN  FOUR TIMES DAILY 100 each 11    rosuvastatin (CRESTOR) 5 MG tablet Take 1 tablet (5 mg total) by mouth once daily. 90 tablet 3    tirzepatide 15 mg/0.5 mL PnIj Inject 15 mg into the skin every 7 days. 4 Pen 6    TOUJEO MAX U-300 SOLOSTAR 300 unit/mL (3 mL) insulin pen Inject 30 Units into the skin once daily.       No current facility-administered medications on file prior to visit.     "

## 2025-08-08 ENCOUNTER — PATIENT MESSAGE (OUTPATIENT)
Dept: PSYCHIATRY | Facility: CLINIC | Age: 30
End: 2025-08-08
Payer: COMMERCIAL

## 2025-08-11 ENCOUNTER — OFFICE VISIT (OUTPATIENT)
Dept: PSYCHIATRY | Facility: CLINIC | Age: 30
End: 2025-08-11
Payer: COMMERCIAL

## 2025-08-11 DIAGNOSIS — Z91.49 HISTORY OF PSYCHOLOGICAL TRAUMA: ICD-10-CM

## 2025-08-11 DIAGNOSIS — F41.1 GAD (GENERALIZED ANXIETY DISORDER): ICD-10-CM

## 2025-08-11 DIAGNOSIS — F33.0 MDD (MAJOR DEPRESSIVE DISORDER), RECURRENT EPISODE, MILD: Primary | ICD-10-CM

## 2025-08-11 PROCEDURE — 3066F NEPHROPATHY DOC TX: CPT | Mod: CPTII,95,, | Performed by: SOCIAL WORKER

## 2025-08-11 PROCEDURE — 3061F NEG MICROALBUMINURIA REV: CPT | Mod: CPTII,95,, | Performed by: SOCIAL WORKER

## 2025-08-11 PROCEDURE — 4010F ACE/ARB THERAPY RXD/TAKEN: CPT | Mod: CPTII,95,, | Performed by: SOCIAL WORKER

## 2025-08-11 PROCEDURE — 90834 PSYTX W PT 45 MINUTES: CPT | Mod: 95,,, | Performed by: SOCIAL WORKER

## 2025-08-11 PROCEDURE — 3051F HG A1C>EQUAL 7.0%<8.0%: CPT | Mod: CPTII,95,, | Performed by: SOCIAL WORKER

## 2025-08-29 ENCOUNTER — OFFICE VISIT (OUTPATIENT)
Dept: FAMILY MEDICINE | Facility: CLINIC | Age: 30
End: 2025-08-29
Payer: COMMERCIAL

## 2025-08-29 VITALS
HEIGHT: 66 IN | HEART RATE: 69 BPM | RESPIRATION RATE: 18 BRPM | WEIGHT: 293 LBS | SYSTOLIC BLOOD PRESSURE: 132 MMHG | BODY MASS INDEX: 47.09 KG/M2 | OXYGEN SATURATION: 99 % | DIASTOLIC BLOOD PRESSURE: 77 MMHG

## 2025-08-29 DIAGNOSIS — F51.01 PRIMARY INSOMNIA: Primary | ICD-10-CM

## 2025-08-29 DIAGNOSIS — F41.9 ANXIETY AND DEPRESSION: ICD-10-CM

## 2025-08-29 DIAGNOSIS — F32.A ANXIETY AND DEPRESSION: ICD-10-CM

## 2025-08-29 DIAGNOSIS — G47.33 OSA (OBSTRUCTIVE SLEEP APNEA): ICD-10-CM

## 2025-08-29 RX ORDER — ESCITALOPRAM OXALATE 10 MG/1
10 TABLET ORAL DAILY
Qty: 90 TABLET | Refills: 3 | Status: SHIPPED | OUTPATIENT
Start: 2025-08-29 | End: 2026-08-29

## 2025-08-29 RX ORDER — TRAZODONE HYDROCHLORIDE 50 MG/1
50 TABLET ORAL NIGHTLY
Qty: 90 TABLET | Refills: 3 | Status: SHIPPED | OUTPATIENT
Start: 2025-08-29 | End: 2026-08-29